# Patient Record
Sex: MALE | Race: BLACK OR AFRICAN AMERICAN | NOT HISPANIC OR LATINO | Employment: FULL TIME | URBAN - METROPOLITAN AREA
[De-identification: names, ages, dates, MRNs, and addresses within clinical notes are randomized per-mention and may not be internally consistent; named-entity substitution may affect disease eponyms.]

---

## 2019-03-11 ENCOUNTER — MEDICAL CORRESPONDENCE (OUTPATIENT)
Dept: TRANSPLANT | Facility: CLINIC | Age: 24
End: 2019-03-11

## 2020-11-26 ENCOUNTER — HOSPITAL ENCOUNTER (EMERGENCY)
Facility: CLINIC | Age: 25
Discharge: HOME | End: 2020-11-26
Attending: EMERGENCY MEDICINE | Admitting: EMERGENCY MEDICINE
Payer: COMMERCIAL

## 2020-11-26 VITALS
DIASTOLIC BLOOD PRESSURE: 68 MMHG | WEIGHT: 146.4 LBS | RESPIRATION RATE: 16 BRPM | HEART RATE: 58 BPM | OXYGEN SATURATION: 100 % | SYSTOLIC BLOOD PRESSURE: 99 MMHG | TEMPERATURE: 97.2 F

## 2020-11-26 DIAGNOSIS — G47.00 INSOMNIA, UNSPECIFIED TYPE: ICD-10-CM

## 2020-11-26 DIAGNOSIS — R51.9 NONINTRACTABLE HEADACHE, UNSPECIFIED CHRONICITY PATTERN, UNSPECIFIED HEADACHE TYPE: ICD-10-CM

## 2020-11-26 LAB
ALBUMIN UR-MCNC: NEGATIVE MG/DL
APPEARANCE UR: CLEAR
BILIRUB UR QL STRIP: NEGATIVE
C TRACH DNA SPEC QL NAA+PROBE: NEGATIVE
COLOR UR AUTO: YELLOW
GLUCOSE UR STRIP-MCNC: NEGATIVE MG/DL
HGB UR QL STRIP: NEGATIVE
KETONES UR STRIP-MCNC: NEGATIVE MG/DL
LEUKOCYTE ESTERASE UR QL STRIP: NEGATIVE
N GONORRHOEA DNA SPEC QL NAA+PROBE: NEGATIVE
NITRATE UR QL: NEGATIVE
PH UR STRIP: 6.5 PH (ref 5–7)
SOURCE: NORMAL
SP GR UR STRIP: 1.02 (ref 1–1.03)
SPECIMEN SOURCE: NORMAL
SPECIMEN SOURCE: NORMAL
UROBILINOGEN UR STRIP-MCNC: 2 MG/DL (ref 0–2)

## 2020-11-26 PROCEDURE — 81003 URINALYSIS AUTO W/O SCOPE: CPT | Performed by: EMERGENCY MEDICINE

## 2020-11-26 PROCEDURE — 99284 EMERGENCY DEPT VISIT MOD MDM: CPT | Performed by: EMERGENCY MEDICINE

## 2020-11-26 PROCEDURE — 250N000011 HC RX IP 250 OP 636: Performed by: EMERGENCY MEDICINE

## 2020-11-26 PROCEDURE — 87591 N.GONORRHOEAE DNA AMP PROB: CPT | Performed by: EMERGENCY MEDICINE

## 2020-11-26 PROCEDURE — 87491 CHLMYD TRACH DNA AMP PROBE: CPT | Performed by: EMERGENCY MEDICINE

## 2020-11-26 PROCEDURE — 96372 THER/PROPH/DIAG INJ SC/IM: CPT | Performed by: EMERGENCY MEDICINE

## 2020-11-26 PROCEDURE — 99284 EMERGENCY DEPT VISIT MOD MDM: CPT | Mod: 25 | Performed by: EMERGENCY MEDICINE

## 2020-11-26 RX ORDER — KETOROLAC TROMETHAMINE 30 MG/ML
30 INJECTION, SOLUTION INTRAMUSCULAR; INTRAVENOUS ONCE
Status: COMPLETED | OUTPATIENT
Start: 2020-11-26 | End: 2020-11-26

## 2020-11-26 RX ORDER — ACETAMINOPHEN 325 MG/1
325-650 TABLET ORAL EVERY 6 HOURS PRN
COMMUNITY

## 2020-11-26 RX ORDER — NAPROXEN 500 MG/1
500 TABLET ORAL 2 TIMES DAILY PRN
Qty: 24 TABLET | Refills: 0 | Status: SHIPPED | OUTPATIENT
Start: 2020-11-26 | End: 2020-12-08

## 2020-11-26 RX ADMIN — KETOROLAC TROMETHAMINE 30 MG: 30 INJECTION, SOLUTION INTRAMUSCULAR at 08:45

## 2020-11-26 NOTE — DISCHARGE INSTRUCTIONS
TODAY'S VISIT:  You were seen today for headache, difficulty sleeping     -You may use Tylenol and/or ibuprofen for headache relief at home.    -Over-the-counter Benadryl and/or melatonin can be used at home to help you sleep  -   - If you had any labs or imaging/radiology tests performed today, you should also discuss these tests with your usual provider.     FOLLOW-UP:  Please make an appointment to follow up with:  - Your Primary Care Provider. If you do not have a PCP, please call the Primary Care Center (phone: [phone] for an appointment    - Have your provider review the results from today's visit with you again to make sure no further follow-up or additional testing is needed based on those results.     RETURN TO THE EMERGENCY DEPARTMENT  Return to the Emergency Department at any time for any new or worsening symptoms or any concerns.

## 2020-11-26 NOTE — ED AVS SNAPSHOT
[facility]  [address]  [place] [address]  Phone: [phone]  Fax: [phone]                                    Stephon Wolfe   MRN: [id number]    Department: [facility]   Date of Visit: 11/26/2020           After Visit Summary Signature Page    I have received my discharge instructions, and my questions have been answered. I have discussed any challenges I see with this plan with the nurse or doctor.    ..........................................................................................................................................  Patient/Patient Representative Signature      ..........................................................................................................................................  Patient Representative Print Name and Relationship to Patient    ..................................................               ................................................  Date                                   Time    ..........................................................................................................................................  Reviewed by Signature/Title    ...................................................              ..............................................  Date                                               Time          22EPIC Rev 08/18

## 2020-11-26 NOTE — ED PROVIDER NOTES
"  History     Chief Complaint   Patient presents with     Insomnia     Headache     HPI  Stephon Wolef is a 24 year old male with a past medical history of GERD, bipolar disorder, agoraphobia, depression, panic disorder, eczema, asthma who presents to the emergency department with a chief complaint of headache.  The headache is located in the frontal area of his head as well as on top of his head.  No associated symptoms. He has tried Tylenol for his headache, this was not effective.  The patient is afebrile on arrival to the emergency department.  No neurological symptoms.  He would like something for his headache in the emergency department today.  Headache has been present for several days.    The patient reports insomnia as well.  He states that he has not been able to sleep very well for 3 or 4 months.  He states he tried an over-the-counter \"sleep aid\" he is not sure what this contained.  Per chart review, it appears that the patient has had prescriptions for Benadryl, melatonin, Ambien, Seroquel to help with sleep in the past.  However, he states he is no longer prescribed any of these medications.  The patient does admit that his routine has changed over the last several months due to the COVID-19 pandemic.  He admits that he has been sleeping late in the day since he has nowhere that he has to be, which resulted him staying up later and later at night.  The patient also admits to drinking several cups of caffeinated tea throughout the day and using his phone shortly before bed and when he cannot sleep at night.    Lastly, patient request to be tested for STIs.  The patient states that he was sexually active a little over a week ago and is unsure if he may have been exposed to any infections at that time.  He states he did use a condom.  He denies any symptoms at this time.    I have reviewed the Medications, Allergies, Past Medical and Surgical History, and Social History in the Epic " system.    History reviewed. No pertinent past medical history.  History reviewed. No pertinent surgical history.  No current facility-administered medications for this encounter.      Current Outpatient Medications   Medication     acetaminophen (TYLENOL) 325 MG tablet     diphenhydrAMINE (BENADRYL) 25 MG tablet     No Known Allergies  Past medical history, past surgical history, medications, and allergies were reviewed with the patient. Additional pertinent items: None    Social History     Socioeconomic History     Marital status: Single     Spouse name: Not on file     Number of children: Not on file     Years of education: Not on file     Highest education level: Not on file   Occupational History     Not on file   Social Needs     Financial resource strain: Not on file     Food insecurity     Worry: Not on file     Inability: Not on file     Transportation needs     Medical: Not on file     Non-medical: Not on file   Tobacco Use     Smoking status: Never Smoker     Smokeless tobacco: Never Used   Substance and Sexual Activity     Alcohol use: No     Drug use: No     Sexual activity: Not on file   Lifestyle     Physical activity     Days per week: Not on file     Minutes per session: Not on file     Stress: Not on file   Relationships     Social connections     Talks on phone: Not on file     Gets together: Not on file     Attends [employer] service: Not on file     Active member of club or organization: Not on file     Attends meetings of clubs or organizations: Not on file     Relationship status: Not on file     Intimate partner violence     Fear of current or ex partner: Not on file     Emotionally abused: Not on file     Physically abused: Not on file     Forced sexual activity: Not on file   Other Topics Concern     Not on file   Social History Narrative     Not on file     Social history was reviewed with the patient. Additional pertinent items: None    Review of Systems  General: No fevers or  chills  Skin: No rash or diaphoresis  Eyes: No eye redness or discharge  Ears/Nose/Throat: No rhinorrhea or nasal congestion  Respiratory: No cough or SOB  Cardiovascular: No chest pain or palpitations  Gastrointestinal: No nausea, vomiting, or diarrhea  Genitourinary: No urinary frequency, hematuria, or dysuria  Musculoskeletal: No arthralgias or myalgias  Neurologic: No numbness or weakness, positive for headache  Hematologic/Lymphatic/Immunologic: No leg swelling, no easy bruising/bleeding  Endocrine: No polyuria/polydypsia    A complete review of systems was performed with pertinent positives and negatives noted in the HPI, and all other systems negative.    Physical Exam   BP: 117/77  Pulse: 57  Temp: 98  F (36.7  C)  Resp: 18  Weight: 66.4 kg (146 lb 6.4 oz)  SpO2: 100 %      General: Well nourished, well developed, NAD  HEENT: EOMI, anicteric. NCAT, MMM  Neck: no jugular venous distension, supple, nl ROM  Cardiac: Regular rate and rhythm. No murmurs, rubs, or gallops. Normal S1, S2.  Intact peripheral pulses  Pulm: Airway patent, nonlabored breathing  Skin: Warm and dry to the touch.  No rash  Extremities: No LE edema, no cyanosis, w/w/p  Neuro: Alert and oriented x 4, no facial droop, CN II-XII intact, strength 5/5 all 4 extremities, sensation intact throughout, steady gait, no nystagmus, coordination normal as tested. PERRL, EOMI.  Speech is clear without aphasia or dysarthria.      ED Course        Procedures                           Labs Ordered and Resulted from Time of ED Arrival Up to the Time of Departure from the ED   UA MACROSCOPIC WITH REFLEX TO MICRO AND CULTURE   CHLAMYDIA TRACHOMATIS PCR   NEISSERIA GONORRHOEAE PCR            Results for orders placed or performed during the hospital encounter of 11/26/20 (from the past 24 hour(s))   UA reflex to Microscopic and Culture    Specimen: Urine clean catch; Unspecified Urine   Result Value Ref Range    Color Urine Yellow     Appearance Urine Clear      Glucose Urine Negative NEG^Negative mg/dL    Bilirubin Urine Negative NEG^Negative    Ketones Urine Negative NEG^Negative mg/dL    Specific Gravity Urine 1.019 1.003 - 1.035    Blood Urine Negative NEG^Negative    pH Urine 6.5 5.0 - 7.0 pH    Protein Albumin Urine Negative NEG^Negative mg/dL    Urobilinogen mg/dL 2.0 0.0 - 2.0 mg/dL    Nitrite Urine Negative NEG^Negative    Leukocyte Esterase Urine Negative NEG^Negative    Source Unspecified Urine        Labs, vital signs, and imaging studies were reviewed by me.    Medications   ketorolac (TORADOL) injection 30 mg (has no administration in time range)       Assessments & Plan (with Medical Decision Making)   Setphon Wolfe is a 24 year old male who presents with headache and insomnia.  Patient is afebrile, neck is supple on exam, no findings to suggest meningitis.  Differential diagnosis for patient's headache includes migraine, tension headache, viral syndrome.  Patient's insomnia may be related to pain or underlying psychiatric diagnoses.  IM Toradol given for headache relief in the emergency department.  Gonorrhea and Chlamydia testing were sent per patient request.    After medications were given in the emergency department, the patient feels better    Urinalysis is wnl.  Gonorrhea and Chlamydia testing are pending at this time    Patient was provided with information on sleep hygiene.  Advised patient that he should cut down on his caffeine consumption, particularly late in the day.  Advised patient to try and stick to 1 or 2 cups of caffeinated tea in the morning hours and then switch to decaffeinated beverages (such as herbal tea) later in the day.  Also advised the patient to limit screen time (phone, computer, TV) in the hours before bed and especially while laying in bed unable to sleep.  Also advised patient that if he is unable to sleep, it may be helpful to get up out of bed for a short period of time, find another activity (such as reading,  etc.) for a few minutes before returning to bed and trying to sleep again.  He was advised that over-the-counter Benadryl or melatonin may be used to help him sleep as well    I have reviewed the nursing notes.    I have reviewed the findings, diagnosis, plan and need for follow up with the patient.    Patient to be discharged home. Advised to follow up with PCP within 1 week. To return to ER immediately with any new/worsening symptoms. Plan of care discussed with patient who expresses understanding and agrees with plan of care.  Patient advised to use ibuprofen and/or Tylenol as needed for headache control at home.      New Prescriptions    MELATONIN 5 MG CAPS    Take 5 mg by mouth nightly as needed (sleep)    NAPROXEN (NAPROSYN) 500 MG TABLET    Take 1 tablet (500 mg) by mouth 2 times daily as needed for moderate pain or headaches       Final diagnoses:   Nonintractable headache, unspecified chronicity pattern, unspecified headache type   Insomnia, unspecified type       11/26/2020   [facility] EMERGENCY DEPARTMENT     Deborah Lomax MD  11/26/20 0906       Deborah Lomax MD  11/26/20 0907

## 2021-02-18 ENCOUNTER — HOSPITAL ENCOUNTER (EMERGENCY)
Facility: CLINIC | Age: 26
Discharge: HOME | End: 2021-02-18
Attending: EMERGENCY MEDICINE | Admitting: EMERGENCY MEDICINE
Payer: COMMERCIAL

## 2021-02-18 VITALS
HEIGHT: 68 IN | BODY MASS INDEX: 22.73 KG/M2 | WEIGHT: 150 LBS | OXYGEN SATURATION: 100 % | HEART RATE: 60 BPM | TEMPERATURE: 98 F | DIASTOLIC BLOOD PRESSURE: 51 MMHG | SYSTOLIC BLOOD PRESSURE: 110 MMHG | RESPIRATION RATE: 18 BRPM

## 2021-02-18 DIAGNOSIS — K08.89 PAIN, DENTAL: ICD-10-CM

## 2021-02-18 DIAGNOSIS — G44.219 EPISODIC TENSION-TYPE HEADACHE, NOT INTRACTABLE: ICD-10-CM

## 2021-02-18 PROCEDURE — 96372 THER/PROPH/DIAG INJ SC/IM: CPT | Performed by: EMERGENCY MEDICINE

## 2021-02-18 PROCEDURE — 99284 EMERGENCY DEPT VISIT MOD MDM: CPT | Performed by: EMERGENCY MEDICINE

## 2021-02-18 PROCEDURE — 250N000011 HC RX IP 250 OP 636: Performed by: EMERGENCY MEDICINE

## 2021-02-18 PROCEDURE — 250N000013 HC RX MED GY IP 250 OP 250 PS 637: Performed by: EMERGENCY MEDICINE

## 2021-02-18 RX ORDER — ERGOCALCIFEROL 1.25 MG/1
CAPSULE ORAL
COMMUNITY

## 2021-02-18 RX ORDER — KETOROLAC TROMETHAMINE 15 MG/ML
15 INJECTION, SOLUTION INTRAMUSCULAR; INTRAVENOUS ONCE
Status: COMPLETED | OUTPATIENT
Start: 2021-02-18 | End: 2021-02-18

## 2021-02-18 RX ORDER — DIPHENHYDRAMINE HCL 50 MG
50 CAPSULE ORAL ONCE
Status: COMPLETED | OUTPATIENT
Start: 2021-02-18 | End: 2021-02-18

## 2021-02-18 RX ADMIN — KETOROLAC TROMETHAMINE 15 MG: 15 INJECTION, SOLUTION INTRAMUSCULAR; INTRAVENOUS at 06:24

## 2021-02-18 RX ADMIN — DIPHENHYDRAMINE HYDROCHLORIDE 50 MG: 50 CAPSULE ORAL at 06:24

## 2021-02-18 ASSESSMENT — ENCOUNTER SYMPTOMS
HEADACHES: 1
SHORTNESS OF BREATH: 0
ABDOMINAL PAIN: 0
BACK PAIN: 0
CONFUSION: 0
FEVER: 0
RHINORRHEA: 0
BRUISES/BLEEDS EASILY: 0
SLEEP DISTURBANCE: 1
DYSURIA: 0
ARTHRALGIAS: 1

## 2021-02-18 ASSESSMENT — MIFFLIN-ST. JEOR: SCORE: 1634.9

## 2021-02-18 NOTE — DISCHARGE INSTRUCTIONS
Thank you for your patience today.  Please follow-up with your regular doctor in the next 2-3 days for further evaluation and follow-up care.  Please call to schedule an appointment.  Please follow-up with your dentist at your scheduled appointment next week. please continue your own medications.  Please take tylenol 1000 mg and ibuprofen 600 mg every 6 hours as needed for pain. Please take melatonin at bedtime to help with sleep. Please return to the ER if you develop any worsening of your current symptoms.  It was a pleasure taking care of you today.  We hope you feel better soon.

## 2021-02-18 NOTE — ED PROVIDER NOTES
ED Provider Note  [facility]      History     Chief Complaint   Patient presents with     Dental Pain     Headache     unable to sleep due to dental pain causing headache     HPI  Stephon Wolfe is a 25 year old male who presents to the emergency department with a complaint of dental pain, headache, insomnia.  Patient reports that he has a bad tooth in his right lower mouth.  Patient complains of intermittent dental pain for the past 1 week and has an appointment scheduled with his dentist next week for likely extraction.  Patient reports that due to his dental pain he has developed a headache and has been unable to sleep at night due to the pain.  Patient denies any fever, chills, vision changes, neck pain, back pain, chest pain, shortness of breath, difficulty swallowing, no other complaints.  Patient states he just wants the pain to decrease a little so he is able to sleep.     Past Medical History  History reviewed. No pertinent past medical history.  History reviewed. No pertinent surgical history.  ergocalciferol (ERGOCALCIFEROL) 1.25 MG (13025 UT) capsule  acetaminophen (TYLENOL) 325 MG tablet  diphenhydrAMINE (BENADRYL) 25 MG tablet      No Known Allergies  Family History  History reviewed. No pertinent family history.  Social History   Social History     Tobacco Use     Smoking status: Never Smoker     Smokeless tobacco: Never Used   Substance Use Topics     Alcohol use: No     Drug use: No      Past medical history, past surgical history, medications, allergies, family history, and social history were reviewed with the patient. No additional pertinent items.       Review of Systems   Constitutional: Negative for fever.   HENT: Positive for dental problem. Negative for rhinorrhea.    Eyes: Negative for visual disturbance.   Respiratory: Negative for shortness of breath.    Cardiovascular: Negative for chest pain.   Gastrointestinal: Negative for abdominal pain.   Endocrine:  "Negative for polyuria.   Genitourinary: Negative for dysuria.   Musculoskeletal: Positive for arthralgias. Negative for back pain.   Skin: Negative for rash.   Allergic/Immunologic: Negative for immunocompromised state.   Neurological: Positive for headaches.   Hematological: Does not bruise/bleed easily.   Psychiatric/Behavioral: Positive for sleep disturbance. Negative for confusion.     Physical Exam   BP: 110/51  Pulse: 60  Temp: 98  F (36.7  C)  Resp: 18  Height: 172.7 cm (5' 8\")  Weight: 68 kg (150 lb)  SpO2: 100 %  Physical Exam  General: Afebrile, no acute distress   HEENT: Normocephalic, atraumatic, conjunctivae normal.  Pupils equal round react to light, extraocular movements intact right lower tooth #30 cracked, necrotic, no area of fluctuance, abscess. Posterior pharynx with no swelling, no exudates, moist mucous membranes  Neck: non-tender, supple  Cardio: regular rate. regular rhythm   Resp: Normal work of breathing, no respiratory distress, lungs clear bilaterally, no wheezing, rhonchi, rales  Chest/Back: no visual signs of trauma, no CVA tenderness   Abdomen: soft, non distension, no tenderness, no peritoneal signs   Neuro: alert and fully oriented. CN II-XII intact.  Normal strength and sensation in all extremities, no focal deficit, normal gait  MSK: no deformities. Normal range of motion  Integumentary/Skin: no rash visualized, normal color  Psych: normal affect, normal behavior    ED Course      Procedures       No results found for any visits on 02/18/21.  Medications   ketorolac (TORADOL) injection 15 mg (has no administration in time range)   diphenhydrAMINE (BENADRYL) capsule 50 mg (has no administration in time range)        Assessments & Plan (with Medical Decision Making)   Stephon Wolfe is a 25 year old male who presents to the emergency department with a complaint of dental pain, headache, insomnia.  Upon arrival patient is well-appearing, afebrile, no distress.  Patient " hemodynamically stable vital signs within normal limits blood pressure 110/51, heart rate 60, oxygen 100% on room air.  Patient with right lower tooth #30 which is cracked, decayed, and has a follow-up appointment with his dentist next week for likely extraction.  Here with headache secondary to dental pain and unable to sleep.  No signs of acute infection.  At this time plan for Toradol, Benadryl to help with his headache.  Encourage patient to go home, rest, continue Tylenol and ibuprofen as needed for pain, soft diet, follow-up closely with his primary care provider and dentist at scheduled appointment.  Return precautions discussed.  Patient understands and agrees with the plan.     I have reviewed the nursing notes. I have reviewed the findings, diagnosis, plan and need for follow up with the patient.    New Prescriptions    No medications on file       Final diagnoses:   Episodic tension-type headache, not intractable   Pain, dental       --  Janie Subramanian MD  [facility] EMERGENCY DEPARTMENT  2/18/2021     Janie Subramanian MD  02/18/21 0608

## 2021-03-06 ENCOUNTER — HOSPITAL ENCOUNTER (EMERGENCY)
Facility: CLINIC | Age: 26
Discharge: HOME | End: 2021-03-06
Attending: EMERGENCY MEDICINE | Admitting: EMERGENCY MEDICINE
Payer: COMMERCIAL

## 2021-03-06 ENCOUNTER — APPOINTMENT (OUTPATIENT)
Dept: CARDIOLOGY | Facility: CLINIC | Age: 26
End: 2021-03-06
Attending: EMERGENCY MEDICINE
Payer: COMMERCIAL

## 2021-03-06 VITALS
OXYGEN SATURATION: 100 % | HEART RATE: 54 BPM | DIASTOLIC BLOOD PRESSURE: 90 MMHG | RESPIRATION RATE: 22 BRPM | SYSTOLIC BLOOD PRESSURE: 123 MMHG

## 2021-03-06 DIAGNOSIS — K08.409 S/P TOOTH EXTRACTION: ICD-10-CM

## 2021-03-06 LAB
ANION GAP SERPL CALCULATED.3IONS-SCNC: 4 MMOL/L (ref 3–14)
BUN SERPL-MCNC: 17 MG/DL (ref 7–30)
CALCIUM SERPL-MCNC: 9.1 MG/DL (ref 8.5–10.1)
CHLORIDE SERPL-SCNC: 104 MMOL/L (ref 94–109)
CO2 SERPL-SCNC: 31 MMOL/L (ref 20–32)
CREAT SERPL-MCNC: 0.87 MG/DL (ref 0.66–1.25)
GFR SERPL CREATININE-BSD FRML MDRD: >90 ML/MIN/{1.73_M2}
GLUCOSE BLDC GLUCOMTR-MCNC: 119 MG/DL (ref 70–99)
GLUCOSE SERPL-MCNC: 125 MG/DL (ref 70–99)
HGB BLD-MCNC: 15.7 G/DL (ref 13.3–17.7)
POTASSIUM SERPL-SCNC: 3.6 MMOL/L (ref 3.4–5.3)
SODIUM SERPL-SCNC: 139 MMOL/L (ref 133–144)
TROPONIN I SERPL-MCNC: <0.015 UG/L (ref 0–0.04)

## 2021-03-06 PROCEDURE — 93010 ELECTROCARDIOGRAM REPORT: CPT | Performed by: EMERGENCY MEDICINE

## 2021-03-06 PROCEDURE — 84484 ASSAY OF TROPONIN QUANT: CPT | Performed by: EMERGENCY MEDICINE

## 2021-03-06 PROCEDURE — 93306 TTE W/DOPPLER COMPLETE: CPT

## 2021-03-06 PROCEDURE — 99285 EMERGENCY DEPT VISIT HI MDM: CPT | Mod: 25 | Performed by: EMERGENCY MEDICINE

## 2021-03-06 PROCEDURE — 93306 TTE W/DOPPLER COMPLETE: CPT | Mod: 26 | Performed by: INTERNAL MEDICINE

## 2021-03-06 PROCEDURE — 96360 HYDRATION IV INFUSION INIT: CPT | Performed by: EMERGENCY MEDICINE

## 2021-03-06 PROCEDURE — 93005 ELECTROCARDIOGRAM TRACING: CPT | Performed by: EMERGENCY MEDICINE

## 2021-03-06 PROCEDURE — 80048 BASIC METABOLIC PNL TOTAL CA: CPT | Performed by: EMERGENCY MEDICINE

## 2021-03-06 PROCEDURE — 258N000003 HC RX IP 258 OP 636: Performed by: EMERGENCY MEDICINE

## 2021-03-06 PROCEDURE — 85018 HEMOGLOBIN: CPT | Performed by: EMERGENCY MEDICINE

## 2021-03-06 PROCEDURE — 999N001017 HC STATISTIC GLUCOSE BY METER IP

## 2021-03-06 PROCEDURE — 99285 EMERGENCY DEPT VISIT HI MDM: CPT | Performed by: EMERGENCY MEDICINE

## 2021-03-06 PROCEDURE — 96361 HYDRATE IV INFUSION ADD-ON: CPT | Performed by: EMERGENCY MEDICINE

## 2021-03-06 RX ORDER — IBUPROFEN 600 MG/1
600 TABLET, FILM COATED ORAL EVERY 6 HOURS PRN
COMMUNITY
End: 2022-05-18

## 2021-03-06 RX ADMIN — SODIUM CHLORIDE 1000 ML: 9 INJECTION, SOLUTION INTRAVENOUS at 17:21

## 2021-03-06 ASSESSMENT — ENCOUNTER SYMPTOMS
ABDOMINAL PAIN: 0
SHORTNESS OF BREATH: 0
FEVER: 0

## 2021-03-06 NOTE — ED PROVIDER NOTES
[facility] EMERGENCY DEPARTMENT ([facility])     March 6, 2021    History     Chief Complaint   Patient presents with     Dental Problem     Patient had a tooth extraction 3 days ago, bleeding stared today around 9 am     The history is provided by the patient and medical records.     Stephon Wolfe is a 25 year old male who presents to the Emergency Department for evaluation of a dental problem.  Patient had a tooth extraction on Wednesday, 3/3/2021 and reports noticing bleeding at his extraction site this morning at 9 am when he woke up.       PAST MEDICAL HISTORY: History reviewed. No pertinent past medical history.    PAST SURGICAL HISTORY: History reviewed. No pertinent surgical history.    Past medical history, past surgical history, medications, and allergies were reviewed with the patient. Additional pertinent items: None    FAMILY HISTORY: History reviewed. No pertinent family history.    SOCIAL HISTORY:   Social History     Tobacco Use     Smoking status: Never Smoker     Smokeless tobacco: Never Used   Substance Use Topics     Alcohol use: No     Social history was reviewed with the patient. Additional pertinent items: None      Patient's Medications   New Prescriptions    No medications on file   Previous Medications    ACETAMINOPHEN (TYLENOL) 325 MG TABLET    Take 325-650 mg by mouth every 6 hours as needed for mild pain    DIPHENHYDRAMINE (BENADRYL) 25 MG TABLET    Take 1 tablet (25 mg) by mouth every 6 hours as needed for itching    ERGOCALCIFEROL (ERGOCALCIFEROL) 1.25 MG (48965 UT) CAPSULE    daily.    IBUPROFEN (ADVIL/MOTRIN) 600 MG TABLET    Take 600 mg by mouth every 6 hours as needed for moderate pain   Modified Medications    No medications on file   Discontinued Medications    No medications on file        No Known Allergies     Review of Systems   Constitutional: Negative for fever.   HENT: Positive for dental problem (bleeding).    Respiratory: Negative for shortness of breath.     Cardiovascular: Negative for chest pain.   Gastrointestinal: Negative for abdominal pain.   All other systems reviewed and are negative.    A complete review of systems was performed with pertinent positives and negatives noted in the HPI, and all other systems negative.    Physical Exam   BP: 118/88  Pulse: 62  Resp: 16  SpO2: 100 %      Physical Exam  Constitutional:       General: He is not in acute distress.     Appearance: He is not diaphoretic.   HENT:      Head: Atraumatic.      Mouth/Throat:     Eyes:      General: No scleral icterus.     Pupils: Pupils are equal, round, and reactive to light.   Cardiovascular:      Heart sounds: Normal heart sounds.   Pulmonary:      Effort: No respiratory distress.      Breath sounds: Normal breath sounds.   Abdominal:      General: Bowel sounds are normal.      Palpations: Abdomen is soft.      Tenderness: There is no abdominal tenderness.   Musculoskeletal:         General: No tenderness.   Skin:     General: Skin is warm.      Findings: No rash.         ED Course   9:48 AM  The patient was seen and examined by Kade Garcia MD in Room ED07.        Procedures                         2 x 2 and Surgicel placed  No results found for this or any previous visit (from the past 24 hour(s)).  Medications - No data to display          Assessments & Plan (with Medical Decision Making)   25-year-old who presents 3 days status post extraction of tooth #30 with bleeding.  Differential includes removal of clot, osteomyelitis, infection, gum laceration.  Exam revealed bleeding from the site of former tooth #30.  Attempts at direct pressure with a rolled up 2 x 2, Surgicel were ultimately unsuccessful.  Dental resident was in to inject tooth and apply Surgicel.  Patient was observed and felt improved.  He will follow up with dental clinic or return to the emergency room with further bleeding.    I have reviewed the nursing notes.    I have reviewed the findings, diagnosis, plan and  need for follow up with the patient.    New Prescriptions    No medications on file       Final diagnoses:   S/P tooth extraction     IMaggie, am serving as a trained medical scribe to document services personally performed by Kade Garcia MD based on the provider's statements to me on March 6, 2021.  This document has been checked and approved by the attending provider.    IKade MD, was physically present and have reviewed and verified the accuracy of this note documented by Maggie Camp, medical scribe.    --  Kade Garcia MD  3/6/2021   [facility] EMERGENCY DEPARTMENT     Kade Garcia MD  03/06/21 [id number]

## 2021-03-07 LAB — INTERPRETATION ECG - MUSE: NORMAL

## 2021-03-07 NOTE — CONSULTS
"Dental Service Consultation         Stephon Wolfe MRN# [id number]   YOB: 1995 Age: 25 year old   Date of Admission: 3/6/2021     Reason for consult: I was asked by Kade Garcia MD to evaluate this patient for bleeding after a tooth extraction.           Assessment and Plan:     Assessment: bleeding from the site of extracted tooth #30    Plan:  1) Instructed the patient to bite on gauze for 5 sitting upright  2) Infiltrated the site with 1.7 ml 2% lidocaine w/epinephrine 1:100,000 and gave right JEAN PIERRE block with 1.7 ml Marcaine 0.5% with epineprhine 1:200,000  3) Placed Surgicel in the socket with 3.0 chromic gut sutures figure 8  4) Instructed patient to bite on damp gauze sitting upright  5) Patient should be monitored for 30 minutes before the discharge  6) Postop instructions given; all the questions answered.   7) Patient should schedule a follow up with [facility] or can be scheduled in [facility] for a follow up ([phone]) upon the discharge.           Chief Complaint:   \"I had a tooth extracted 3 days ago and this morning I experienced pain and profound bleeding\"          History of Present Illness:   This patient is a 25 year old male who presents to ED [facility] with bleeding after the extraction of #30 tooth.               Past Medical History:   History reviewed. No pertinent past medical history.          Past Surgical History:   History reviewed. No pertinent surgical history.            Social History:     Social History     Tobacco Use     Smoking status: Never Smoker     Smokeless tobacco: Never Used   Substance Use Topics     Alcohol use: No             Family History:   History reviewed. No pertinent family history.          Immunizations:     There is no immunization history on file for this patient.          Allergies:   No Known Allergies          Medications:     No current Epic-ordered facility-administered medications on file.      Current Outpatient Medications " Ordered in Epic   Medication     ibuprofen (ADVIL/MOTRIN) 600 MG tablet     acetaminophen (TYLENOL) 325 MG tablet     diphenhydrAMINE (BENADRYL) 25 MG tablet     ergocalciferol (ERGOCALCIFEROL) 1.25 MG (93604 UT) capsule             Review of Systems:   The 10 point Review of Systems is negative other than noted in the HPI            Physical Exam:   Vitals were reviewed      BP: 117/89 Pulse: 57   Resp: 22 SpO2: 100 % O2 Device: None (Room air)      Head and neck exam : No obvious facial asymmetry, trismus or lymphadenopathy. No submental, submandibular or other cervical lymph nodes are palpable and TMJ appear normal.   Intraoral exam: Bleeding from the site of the extracted tooth #30.    The patient was discussed with: Dr. Tayo Durbin   PGY1  Pager: [phone]

## 2021-03-07 NOTE — ED NOTES
Emergency Department Patient Sign-out       Brief HPI and ED course:  Patient is a 25 year old male signed out to me by Dr. Garcia.  See initial ED Provider note for details of the presentation. In brief, prior to being discharged, patient reported feeling lightheaded and ill.  He was noted to be bradycardic and hypertensive.  IV was established and fluids were started.  Hemoglobin is within normal limits.  Twelve-lead EKG demonstrated a sinus bradycardia with ST elevations and convex appearing ST elevation in V1 with some diffuse ST elevations with convex appearing ST segments.  No reciprocal changes were noted.  I did discuss this with interventional cardiology and it was felt that this most likely represented early repolarization but that we should obtain a stat echo to evaluate for any regional wall motion abnormalities.  This echo demonstrated no regional wall motion abnormalities.  Troponin is negative and patient feels better after period of observation with IV fluids.  Vital signs have normalized and at this point I am comfortable discharging him and suspect that he had a reaction to the anesthetic which had been injected which contained epinephrine.  This was all discussed with the patient and his family and he was discharged in good condition.    Vitals:   Patient Vitals for the past 24 hrs:   BP Pulse Resp SpO2   03/06/21 1945 112/85 56 -- 100 %   03/06/21 1930 124/79 64 -- 100 %   03/06/21 1915 116/84 56 -- 99 %   03/06/21 1900 117/89 57 -- 100 %   03/06/21 1845 (!) 119/91 59 -- 100 %   03/06/21 1830 (!) 123/92 52 -- 100 %   03/06/21 1815 (!) 142/91 52 -- 100 %   03/06/21 1800 (!) 141/96 53 -- 100 %   03/06/21 1745 (!) 153/104 52 -- 100 %   03/06/21 1738 (!) 143/99 53 -- 100 %   03/06/21 1730 (!) 146/96 52 22 100 %   03/06/21 1715 (!) 157/104 -- -- 100 %   03/06/21 1700 (!) 161/105 50 -- 97 %   03/06/21 1632 (!) 167/110 (!) 48 -- --   03/06/21 1629 (!) 170/114 52 -- --   03/06/21 0937 118/88 62 16 100  %       Results for orders placed or performed during the hospital encounter of 21   Glucose by meter     Status: Abnormal   Result Value Ref Range    Glucose 119 (H) 70 - 99 mg/dL   Hemoglobin     Status: None   Result Value Ref Range    Hemoglobin 15.7 13.3 - 17.7 g/dL   Basic metabolic panel     Status: Abnormal   Result Value Ref Range    Sodium 139 133 - 144 mmol/L    Potassium 3.6 3.4 - 5.3 mmol/L    Chloride 104 94 - 109 mmol/L    Carbon Dioxide 31 20 - 32 mmol/L    Anion Gap 4 3 - 14 mmol/L    Glucose 125 (H) 70 - 99 mg/dL    Urea Nitrogen 17 7 - 30 mg/dL    Creatinine 0.87 0.66 - 1.25 mg/dL    GFR Estimate >90 >60 mL/min/[1.73_m2]    GFR Estimate If Black >90 >60 mL/min/[1.73_m2]    Calcium 9.1 8.5 - 10.1 mg/dL   Troponin I     Status: None   Result Value Ref Range    Troponin I ES <0.015 0.000 - 0.045 ug/L   Echocardiogram Complete     Status: None    Narrative    [id number]  [id number]  [id number]  [id number]^MAXIMILIANO^OLIVA^[facility],[place]  Echocardiography Laboratory  [address] [place], [place] [address]     Name: KATHY WAGNER  MRN: [id number]  : 1995  Study Date: 2021 07:09 PM  Age: 25 yrs  Gender: Male  Patient Location: [facility]  Reason For Study: Abn EKG  Ordering Physician: OLIVA VIERA  Performed By: Valery Yang RDCS     BSA: 1.8 m2  Height: 68 in  Weight: 150 lb  HR: 58  BP: 117/89 mmHg  _____________________________________________________________________________  __        Procedure  Complete Portable Echo Adult.  _____________________________________________________________________________  __        Interpretation Summary  Global and regional left ventricular function is normal with an EF of 60-65%.  Right ventricular function, chamber size, wall motion, and thickness are  normal.  Both atria appear normal.  Pulmonary artery systolic pressure is normal.  The inferior vena cava is normal.  No pericardial effusion is  present.  There is no prior study for direct comparison.  _____________________________________________________________________________  __        Left Ventricle  Global and regional left ventricular function is normal with an EF of 60-65%.  Left ventricular wall thickness is normal. Left ventricular size is normal.  Left ventricular diastolic function is normal. No regional wall motion  abnormalities are seen.     Right Ventricle  Right ventricular function, chamber size, wall motion, and thickness are  normal.     Atria  Both atria appear normal.     Mitral Valve  The mitral valve is normal. Trace mitral insufficiency is present.        Aortic Valve  The valve leaflets are not well visualized. On Doppler interrogation, there is  no significant stenosis or regurgitation.     Tricuspid Valve  The tricuspid valve is normal. Trace to mild tricuspid insufficiency is  present. The right ventricular systolic pressure is approximated at 15.7 mmHg  plus the right atrial pressure. Pulmonary artery systolic pressure is normal.     Pulmonic Valve  The pulmonic valve is normal. Trace pulmonic insufficiency is present.     Vessels  The thoracic aorta cannot be assessed. The pulmonary artery and bifurcation  cannot be assessed. The inferior vena cava is normal.     Pericardium  No pericardial effusion is present.        Compared to Previous Study  There is no prior study for direct comparison.  _____________________________________________________________________________  __  MMode/2D Measurements & Calculations     IVSd: 1.0 cm  LVIDd: 4.5 cm  LVIDs: 3.3 cm  LVPWd: 1.1 cm  FS: 25.2 %  LV mass(C)d: 162.6 grams  LV mass(C)dI: 89.9 grams/m2  RWT: 0.49        Doppler Measurements & Calculations  MV E max koffi: 77.0 cm/sec  MV A max koffi: 37.0 cm/sec  MV E/A: 2.1  MV dec time: 0.16 sec  TR max koffi: 198.1 cm/sec  TR max PG: 15.7 mmHg  E/E' av.3  Lateral E/e': 5.4  Medial E/e': 7.1         _____________________________________________________________________________  __        Report approved by: Vazquez Watkins 03/06/2021 07:44 PM               EKG Interpretation:      Interpreted by Last Balderrama MD  Time reviewed:1380   Symptoms at time of EKG: Lightheadedness  Rhythm: Sinus bradycardia  Rate: 51  Ectopy: None  Conduction: Normal  ST Segments/ T Waves: ST Segement Elevation I, aVL, V1, V2, V3, V4, V5 and V6 and Early repolarization  Q Waves: None  Comparison to prior: No old EKG available    Clinical Impression: Sinus bradycardia with some diffuse ST elevation, suspect early repolarization       --  Last Balderrama MD   Emergency Medicine       Last Balderrama MD  03/06/21 2006

## 2021-06-13 ENCOUNTER — HOSPITAL ENCOUNTER (EMERGENCY)
Facility: CLINIC | Age: 26
Discharge: HOME | End: 2021-06-13
Attending: EMERGENCY MEDICINE | Admitting: EMERGENCY MEDICINE
Payer: COMMERCIAL

## 2021-06-13 VITALS
HEART RATE: 62 BPM | DIASTOLIC BLOOD PRESSURE: 70 MMHG | SYSTOLIC BLOOD PRESSURE: 100 MMHG | OXYGEN SATURATION: 100 % | RESPIRATION RATE: 16 BRPM | TEMPERATURE: 97.6 F | BODY MASS INDEX: 22.35 KG/M2 | WEIGHT: 147 LBS

## 2021-06-13 DIAGNOSIS — G47.00 INSOMNIA, UNSPECIFIED TYPE: ICD-10-CM

## 2021-06-13 DIAGNOSIS — F41.9 ANXIETY: ICD-10-CM

## 2021-06-13 PROCEDURE — 99283 EMERGENCY DEPT VISIT LOW MDM: CPT

## 2021-06-13 PROCEDURE — 99283 EMERGENCY DEPT VISIT LOW MDM: CPT | Performed by: EMERGENCY MEDICINE

## 2021-06-13 RX ORDER — LANOLIN ALCOHOL/MO/W.PET/CERES
5 CREAM (GRAM) TOPICAL
COMMUNITY
End: 2021-06-13

## 2021-06-13 NOTE — DISCHARGE INSTRUCTIONS
Follow a good sleep hygiene plan to improve your sleep.  Try to make the following behavioral modifications:    -Allow at least 8 hours of sleep per night.  -Try to create a schedule where you go to bed and wake up at approximately the same time.  -No screen time TV or iPhone's at least 1 hour before bedtime.  -Avoid any caffeinated beverages after 10am  -Avoid exercise, alcohol or any food intake at least 2 hours before bedtime.  -Discontinue melatonin.

## 2021-06-13 NOTE — ED PROVIDER NOTES
ED Provider Note  [facility]      History     Chief Complaint   Patient presents with     Insomnia     Pt having insomnia.  Pt describes playing soccer twice a day and having difficulty falling asleep after playing from body aches.     HPI  Stephon Wolfe is a 25 year old otherwise healthy male who presents to the Emergency Department with difficulty sleeping for the last 2 weeks or so.  Notes some increased anxiety after her first Covid vaccination 10 days ago.  Describes difficulty with both sleep onset as well as sleep maintenance.  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  Drinks tea throughout the day.  Use melatonin.  Denies any drugs or alcohol.  Again somewhat anxious over recent Covid vaccination-concerned something bad will happen with his heart.  Otherwise believes he does not have any issues with anxiety or excessive worry.    No fevers or chills chest pain or shortness of breath.  No abdominal pain.  No nausea.    Past Medical History  History reviewed. No pertinent past medical history.  History reviewed. No pertinent surgical history.  acetaminophen (TYLENOL) 325 MG tablet  ergocalciferol (ERGOCALCIFEROL) 1.25 MG (24697 UT) capsule  ibuprofen (ADVIL/MOTRIN) 600 MG tablet  diphenhydrAMINE (BENADRYL) 25 MG tablet      No Known Allergies  Family History  History reviewed. No pertinent family history.  Social History   Social History     Tobacco Use     Smoking status: Never Smoker     Smokeless tobacco: Never Used   Substance Use Topics     Alcohol use: No     Drug use: No      Past medical history, past surgical history, medications, allergies, family history, and social history were reviewed with the patient. No additional pertinent items.       Review of Systems    10 point review of symptoms was performed and is negative except as noted above.   A complete review of systems was performed with  pertinent positives and negatives noted in the HPI, and all other systems negative.    Physical Exam   BP: 107/71  Pulse: 78  Temp: 96.1  F (35.6  C)  Resp: 16  Weight: 66.7 kg (147 lb)  SpO2: 99 %  Physical Exam  GEN: Well appearing, non toxic, cooperative and conversant.   HEENT: The head is normocephalic and atraumatic. Pupils are equal round and reactive to light. Extraocular motions are intact. There is no facial swelling. The neck is nontender and supple.   CV: Regular rate and rhythm without murmurs rubs or gallops. 2+ radial pulses bilaterally.  PULM: Clear to auscultation bilaterally.  ABD: Soft, nontender, nondistended.   EXT: Full range of motion.  No edema.  NEURO: Cranial nerves II through XII are intact and symmetric. Bilateral upper and lower extremities grossly show full range of motion without any focal deficits.   SKIN: No rashes, ecchymosis, or lacerations  PSYCH: Calm and cooperative, interactive.     ED Course      Procedures               No results found for any visits on 06/13/21.  Medications - No data to display     Assessments & Plan (with Medical Decision Making)   25-year-old otherwise healthy male presenting with difficulty sleeping.  DDx includes issues with sleep onset, sleep maintenance, excessive caffeine intake, thyroid dysfunction, excessive worry/anxiety, poor sleep hygiene, among other factors.    Overall the patient appears clinically well.  His difficulty with sleep is likely multifactorial.  Given his clinical appearance and vital signs, my suspicion for significant hyperthyroid state is low.  We discussed a variety of behavioral modifications to start with to improve his sleep duration and quality.  He is very willing to try these and they have been outlined in the discharge instructions.  He will follow-up with his primary care doctor and return to the emergency department for any worsening symptoms.  Patient agreeable to this plan.    - Patient agrees to our plan and is  ready and eager for discharge. Care plan, follow up plan, and reasons to return immediately to the ED were dicussed in detail and summarized as noted in the discharge instructions.      I have reviewed the nursing notes. I have reviewed the findings, diagnosis, plan and need for follow up with the patient.    Current Discharge Medication List          Final diagnoses:   Insomnia, unspecified type   Anxiety       --  Alfredo Villegas MD    [facility] EMERGENCY DEPARTMENT  6/13/2021     Alfredo Villegas MD  06/13/21 0410

## 2022-03-01 ENCOUNTER — HOSPITAL ENCOUNTER (EMERGENCY)
Facility: CLINIC | Age: 27
Discharge: HOME | End: 2022-03-01
Attending: EMERGENCY MEDICINE | Admitting: EMERGENCY MEDICINE
Payer: COMMERCIAL

## 2022-03-01 VITALS
TEMPERATURE: 98.1 F | HEART RATE: 62 BPM | OXYGEN SATURATION: 100 % | WEIGHT: 164.6 LBS | BODY MASS INDEX: 25.03 KG/M2 | SYSTOLIC BLOOD PRESSURE: 100 MMHG | RESPIRATION RATE: 16 BRPM | DIASTOLIC BLOOD PRESSURE: 52 MMHG

## 2022-03-01 DIAGNOSIS — G47.00 INSOMNIA, UNSPECIFIED TYPE: ICD-10-CM

## 2022-03-01 DIAGNOSIS — R51.9 NONINTRACTABLE HEADACHE, UNSPECIFIED CHRONICITY PATTERN, UNSPECIFIED HEADACHE TYPE: ICD-10-CM

## 2022-03-01 PROCEDURE — 99284 EMERGENCY DEPT VISIT MOD MDM: CPT | Performed by: EMERGENCY MEDICINE

## 2022-03-01 PROCEDURE — 96372 THER/PROPH/DIAG INJ SC/IM: CPT | Performed by: EMERGENCY MEDICINE

## 2022-03-01 PROCEDURE — 250N000011 HC RX IP 250 OP 636: Performed by: EMERGENCY MEDICINE

## 2022-03-01 RX ORDER — TRAZODONE HYDROCHLORIDE 100 MG/1
100 TABLET ORAL AT BEDTIME
Qty: 30 TABLET | Refills: 0 | Status: SHIPPED | OUTPATIENT
Start: 2022-03-01

## 2022-03-01 RX ORDER — KETOROLAC TROMETHAMINE 30 MG/ML
60 INJECTION, SOLUTION INTRAMUSCULAR; INTRAVENOUS ONCE
Status: COMPLETED | OUTPATIENT
Start: 2022-03-01 | End: 2022-03-01

## 2022-03-01 RX ORDER — KETOROLAC TROMETHAMINE 30 MG/ML
60 INJECTION, SOLUTION INTRAMUSCULAR; INTRAVENOUS ONCE
Status: DISCONTINUED | OUTPATIENT
Start: 2022-03-01 | End: 2022-03-01

## 2022-03-01 RX ADMIN — KETOROLAC TROMETHAMINE 60 MG: 30 INJECTION, SOLUTION INTRAMUSCULAR at 08:06

## 2022-03-01 NOTE — ED PROVIDER NOTES
ED Provider Note  [facility]      History     Chief Complaint   Patient presents with     Insomnia     Pt states that he has been working the night shift and cannot sleep. He has an intermittent headache     HPI  Stephon Wolfe is a 26 year old male who presents to the emergency department complaining of difficulty sleeping.  He has been working the night shift for the past 2 months and states he has been having a very difficult time getting to sleep and this seems to be worsening lately.  He reports some muscular pain as well as a headache which is similar to headaches he has had before.  No fevers or vomiting.  He is requesting something to help him sleep.  He has been using melatonin to no effect.    Past Medical History  No past medical history on file.  No past surgical history on file.  acetaminophen (TYLENOL) 325 MG tablet  diphenhydrAMINE (BENADRYL) 25 MG tablet  ergocalciferol (ERGOCALCIFEROL) 1.25 MG (52875 UT) capsule  ibuprofen (ADVIL/MOTRIN) 600 MG tablet      No Known Allergies  Family History  No family history on file.  Social History   Social History     Tobacco Use     Smoking status: Never Smoker     Smokeless tobacco: Never Used   Substance Use Topics     Alcohol use: No     Drug use: No      Past medical history, past surgical history, medications, allergies, family history, and social history were reviewed with the patient. No additional pertinent items.       Review of Systems  A complete review of systems was performed with pertinent positives and negatives noted in the HPI, and all other systems negative.    Physical Exam   BP: 100/52  Pulse: 62  Temp: 98.1  F (36.7  C)  Resp: 16  Weight: 74.7 kg (164 lb 9.6 oz)  SpO2: 100 %  Physical Exam  Vitals and nursing note reviewed.   Constitutional:       General: He is not in acute distress.     Appearance: He is well-developed. He is not ill-appearing or toxic-appearing.   HENT:      Head: Normocephalic and  atraumatic.   Eyes:      General: No scleral icterus.     Pupils: Pupils are equal, round, and reactive to light.   Neck:      Comments: No meningeal signs  Cardiovascular:      Rate and Rhythm: Normal rate.   Pulmonary:      Effort: Pulmonary effort is normal. No respiratory distress.   Musculoskeletal:      Cervical back: Normal range of motion and neck supple.   Skin:     General: Skin is warm and dry.      Coloration: Skin is not pale.      Findings: No rash.   Neurological:      General: No focal deficit present.      Mental Status: He is alert and oriented to person, place, and time.      Cranial Nerves: Cranial nerves are intact.      Sensory: No sensory deficit.      Motor: Motor function is intact.      Gait: Gait is intact.   Psychiatric:         Behavior: Behavior normal.         ED Course      Procedures                     No results found for any visits on 03/01/22.  Medications   ketorolac (TORADOL) injection 60 mg (has no administration in time range)   ketorolac (TORADOL) injection 60 mg (has no administration in time range)        Assessments & Plan (with Medical Decision Making)     This patient presented to the emergency department with complaints of typical headache.  There is nothing in his history or on his physical exam to suggest more serious causes of headache such as subarachnoid hemorrhage, subdural or epidural hematoma, intracranial mass, increased intracranial pressure, meningitis or encephalitis, temporal arteritis, acute angle-closure glaucoma or venous sinus thrombosis. He was treated with 60 mg of IM Toradol and was discharged with a prescription for trazodone to help with sleep.  He was encouraged to follow-up with his clinic if he continues to have problems with insomnia.     I have reviewed the nursing notes. I have reviewed the findings, diagnosis, plan and need for follow up with the patient.    New Prescriptions    No medications on file       Final diagnoses:   Nonintractable  headache, unspecified chronicity pattern, unspecified headache type   Insomnia, unspecified type       --  Last HUNG [facility] EMERGENCY DEPARTMENT  3/1/2022     Last Balderrama MD  03/01/22 0803

## 2022-03-01 NOTE — DISCHARGE INSTRUCTIONS
Please make an appointment to follow up with Your Primary Care Provider if not improving.    Trazodone, as directed, for sleep.    Return to the emergency department for any problems.

## 2022-03-12 ENCOUNTER — APPOINTMENT (OUTPATIENT)
Dept: MRI IMAGING | Facility: CLINIC | Age: 27
End: 2022-03-12
Payer: COMMERCIAL

## 2022-03-12 ENCOUNTER — APPOINTMENT (OUTPATIENT)
Dept: GENERAL RADIOLOGY | Facility: CLINIC | Age: 27
End: 2022-03-12
Payer: COMMERCIAL

## 2022-03-12 ENCOUNTER — APPOINTMENT (OUTPATIENT)
Dept: CT IMAGING | Facility: CLINIC | Age: 27
End: 2022-03-12
Attending: EMERGENCY MEDICINE
Payer: COMMERCIAL

## 2022-03-12 ENCOUNTER — HOSPITAL ENCOUNTER (INPATIENT)
Facility: CLINIC | Age: 27
LOS: 5 days | Discharge: HOME | End: 2022-03-17
Attending: EMERGENCY MEDICINE | Admitting: STUDENT IN AN ORGANIZED HEALTH CARE EDUCATION/TRAINING PROGRAM
Payer: COMMERCIAL

## 2022-03-12 DIAGNOSIS — I77.6 AORTITIS (H): ICD-10-CM

## 2022-03-12 DIAGNOSIS — Z22.7 TB LUNG, LATENT: Primary | ICD-10-CM

## 2022-03-12 DIAGNOSIS — Z11.52 ENCOUNTER FOR SCREENING LABORATORY TESTING FOR SEVERE ACUTE RESPIRATORY SYNDROME CORONAVIRUS 2 (SARS-COV-2): ICD-10-CM

## 2022-03-12 LAB
ALBUMIN SERPL-MCNC: 4.1 G/DL (ref 3.4–5)
ALBUMIN UR-MCNC: 20 MG/DL
ALP SERPL-CCNC: 100 U/L (ref 40–150)
ALT SERPL W P-5'-P-CCNC: 19 U/L (ref 0–70)
ANION GAP SERPL CALCULATED.3IONS-SCNC: 3 MMOL/L (ref 3–14)
APPEARANCE UR: CLEAR
AST SERPL W P-5'-P-CCNC: 18 U/L (ref 0–45)
BASOPHILS # BLD AUTO: 0 10E3/UL (ref 0–0.2)
BASOPHILS NFR BLD AUTO: 0 %
BILIRUB SERPL-MCNC: 0.5 MG/DL (ref 0.2–1.3)
BILIRUB UR QL STRIP: NEGATIVE
BUN SERPL-MCNC: 10 MG/DL (ref 7–30)
CALCIUM SERPL-MCNC: 9.2 MG/DL (ref 8.5–10.1)
CHLORIDE BLD-SCNC: 103 MMOL/L (ref 94–109)
CO2 SERPL-SCNC: 32 MMOL/L (ref 20–32)
COLOR UR AUTO: YELLOW
CREAT BLD-MCNC: 1 MG/DL (ref 0.7–1.3)
CREAT SERPL-MCNC: 0.83 MG/DL (ref 0.66–1.25)
CRP SERPL-MCNC: 6.3 MG/L (ref 0–8)
EOSINOPHIL # BLD AUTO: 0.1 10E3/UL (ref 0–0.7)
EOSINOPHIL NFR BLD AUTO: 1 %
ERYTHROCYTE [DISTWIDTH] IN BLOOD BY AUTOMATED COUNT: 12.4 % (ref 10–15)
ERYTHROCYTE [SEDIMENTATION RATE] IN BLOOD BY WESTERGREN METHOD: 8 MM/HR (ref 0–15)
GFR SERPL CREATININE-BSD FRML MDRD: >60 ML/MIN/1.73M2
GFR SERPL CREATININE-BSD FRML MDRD: >90 ML/MIN/1.73M2
GLUCOSE BLD-MCNC: 88 MG/DL (ref 70–99)
GLUCOSE UR STRIP-MCNC: NEGATIVE MG/DL
HCT VFR BLD AUTO: 45.7 % (ref 40–53)
HGB BLD-MCNC: 15.5 G/DL (ref 13.3–17.7)
HGB UR QL STRIP: NEGATIVE
HOLD SPECIMEN: NORMAL
IMM GRANULOCYTES # BLD: 0 10E3/UL
IMM GRANULOCYTES NFR BLD: 0 %
KETONES UR STRIP-MCNC: ABNORMAL MG/DL
LEUKOCYTE ESTERASE UR QL STRIP: NEGATIVE
LIPASE SERPL-CCNC: 79 U/L (ref 73–393)
LYMPHOCYTES # BLD AUTO: 1.9 10E3/UL (ref 0.8–5.3)
LYMPHOCYTES NFR BLD AUTO: 34 %
MCH RBC QN AUTO: 29.6 PG (ref 26.5–33)
MCHC RBC AUTO-ENTMCNC: 33.9 G/DL (ref 31.5–36.5)
MCV RBC AUTO: 87 FL (ref 78–100)
MONOCYTES # BLD AUTO: 0.6 10E3/UL (ref 0–1.3)
MONOCYTES NFR BLD AUTO: 11 %
MUCOUS THREADS #/AREA URNS LPF: PRESENT /LPF
NEUTROPHILS # BLD AUTO: 3 10E3/UL (ref 1.6–8.3)
NEUTROPHILS NFR BLD AUTO: 54 %
NITRATE UR QL: NEGATIVE
NRBC # BLD AUTO: 0 10E3/UL
NRBC BLD AUTO-RTO: 0 /100
PH UR STRIP: 6 [PH] (ref 5–7)
PLATELET # BLD AUTO: 244 10E3/UL (ref 150–450)
POTASSIUM BLD-SCNC: 3.4 MMOL/L (ref 3.4–5.3)
PROT SERPL-MCNC: 8.6 G/DL (ref 6.8–8.8)
RBC # BLD AUTO: 5.24 10E6/UL (ref 4.4–5.9)
RBC URINE: 1 /HPF
SARS-COV-2 RNA RESP QL NAA+PROBE: NEGATIVE
SODIUM SERPL-SCNC: 138 MMOL/L (ref 133–144)
SP GR UR STRIP: 1.03 (ref 1–1.03)
SPERM #/AREA URNS HPF: PRESENT /HPF
SQUAMOUS EPITHELIAL: <1 /HPF
T PALLIDUM AB SER QL: NONREACTIVE
UROBILINOGEN UR STRIP-MCNC: 2 MG/DL
WBC # BLD AUTO: 5.5 10E3/UL (ref 4–11)
WBC URINE: 2 /HPF

## 2022-03-12 PROCEDURE — 86803 HEPATITIS C AB TEST: CPT

## 2022-03-12 PROCEDURE — 93010 ELECTROCARDIOGRAM REPORT: CPT | Performed by: INTERNAL MEDICINE

## 2022-03-12 PROCEDURE — 74185 MRA ABD W OR W/O CNTRST: CPT | Mod: 26 | Performed by: RADIOLOGY

## 2022-03-12 PROCEDURE — 96361 HYDRATE IV INFUSION ADD-ON: CPT | Performed by: EMERGENCY MEDICINE

## 2022-03-12 PROCEDURE — 82784 ASSAY IGA/IGD/IGG/IGM EACH: CPT

## 2022-03-12 PROCEDURE — 81001 URINALYSIS AUTO W/SCOPE: CPT | Performed by: EMERGENCY MEDICINE

## 2022-03-12 PROCEDURE — 86706 HEP B SURFACE ANTIBODY: CPT

## 2022-03-12 PROCEDURE — 82565 ASSAY OF CREATININE: CPT

## 2022-03-12 PROCEDURE — 99223 1ST HOSP IP/OBS HIGH 75: CPT | Mod: GC | Performed by: INTERNAL MEDICINE

## 2022-03-12 PROCEDURE — 87340 HEPATITIS B SURFACE AG IA: CPT

## 2022-03-12 PROCEDURE — 80053 COMPREHEN METABOLIC PANEL: CPT | Performed by: EMERGENCY MEDICINE

## 2022-03-12 PROCEDURE — 86038 ANTINUCLEAR ANTIBODIES: CPT

## 2022-03-12 PROCEDURE — C9803 HOPD COVID-19 SPEC COLLECT: HCPCS | Performed by: EMERGENCY MEDICINE

## 2022-03-12 PROCEDURE — 96374 THER/PROPH/DIAG INJ IV PUSH: CPT | Mod: 59 | Performed by: EMERGENCY MEDICINE

## 2022-03-12 PROCEDURE — 83690 ASSAY OF LIPASE: CPT | Performed by: EMERGENCY MEDICINE

## 2022-03-12 PROCEDURE — 85025 COMPLETE CBC W/AUTO DIFF WBC: CPT | Performed by: EMERGENCY MEDICINE

## 2022-03-12 PROCEDURE — 258N000003 HC RX IP 258 OP 636: Performed by: EMERGENCY MEDICINE

## 2022-03-12 PROCEDURE — 36415 COLL VENOUS BLD VENIPUNCTURE: CPT | Performed by: STUDENT IN AN ORGANIZED HEALTH CARE EDUCATION/TRAINING PROGRAM

## 2022-03-12 PROCEDURE — 250N000011 HC RX IP 250 OP 636: Performed by: EMERGENCY MEDICINE

## 2022-03-12 PROCEDURE — 86140 C-REACTIVE PROTEIN: CPT | Performed by: EMERGENCY MEDICINE

## 2022-03-12 PROCEDURE — 86256 FLUORESCENT ANTIBODY TITER: CPT

## 2022-03-12 PROCEDURE — 250N000009 HC RX 250: Performed by: EMERGENCY MEDICINE

## 2022-03-12 PROCEDURE — 93005 ELECTROCARDIOGRAM TRACING: CPT

## 2022-03-12 PROCEDURE — 99285 EMERGENCY DEPT VISIT HI MDM: CPT | Performed by: EMERGENCY MEDICINE

## 2022-03-12 PROCEDURE — 86780 TREPONEMA PALLIDUM: CPT

## 2022-03-12 PROCEDURE — 85652 RBC SED RATE AUTOMATED: CPT | Performed by: EMERGENCY MEDICINE

## 2022-03-12 PROCEDURE — 74177 CT ABD & PELVIS W/CONTRAST: CPT

## 2022-03-12 PROCEDURE — 99285 EMERGENCY DEPT VISIT HI MDM: CPT | Mod: 25 | Performed by: EMERGENCY MEDICINE

## 2022-03-12 PROCEDURE — 255N000002 HC RX 255 OP 636: Performed by: STUDENT IN AN ORGANIZED HEALTH CARE EDUCATION/TRAINING PROGRAM

## 2022-03-12 PROCEDURE — 99223 1ST HOSP IP/OBS HIGH 75: CPT | Mod: AI | Performed by: STUDENT IN AN ORGANIZED HEALTH CARE EDUCATION/TRAINING PROGRAM

## 2022-03-12 PROCEDURE — 120N000002 HC R&B MED SURG/OB UMMC

## 2022-03-12 PROCEDURE — 96375 TX/PRO/DX INJ NEW DRUG ADDON: CPT | Performed by: EMERGENCY MEDICINE

## 2022-03-12 PROCEDURE — 36415 COLL VENOUS BLD VENIPUNCTURE: CPT | Performed by: EMERGENCY MEDICINE

## 2022-03-12 PROCEDURE — 74185 MRA ABD W OR W/O CNTRST: CPT

## 2022-03-12 PROCEDURE — 71046 X-RAY EXAM CHEST 2 VIEWS: CPT

## 2022-03-12 PROCEDURE — 87040 BLOOD CULTURE FOR BACTERIA: CPT | Performed by: STUDENT IN AN ORGANIZED HEALTH CARE EDUCATION/TRAINING PROGRAM

## 2022-03-12 PROCEDURE — 71046 X-RAY EXAM CHEST 2 VIEWS: CPT | Mod: 26 | Performed by: STUDENT IN AN ORGANIZED HEALTH CARE EDUCATION/TRAINING PROGRAM

## 2022-03-12 PROCEDURE — A9585 GADOBUTROL INJECTION: HCPCS | Performed by: STUDENT IN AN ORGANIZED HEALTH CARE EDUCATION/TRAINING PROGRAM

## 2022-03-12 PROCEDURE — U0005 INFEC AGEN DETEC AMPLI PROBE: HCPCS | Performed by: EMERGENCY MEDICINE

## 2022-03-12 RX ORDER — AMOXICILLIN 250 MG
2 CAPSULE ORAL 2 TIMES DAILY PRN
Status: DISCONTINUED | OUTPATIENT
Start: 2022-03-12 | End: 2022-03-17 | Stop reason: HOSPADM

## 2022-03-12 RX ORDER — GADOBUTROL 604.72 MG/ML
10 INJECTION INTRAVENOUS ONCE
Status: COMPLETED | OUTPATIENT
Start: 2022-03-12 | End: 2022-03-12

## 2022-03-12 RX ORDER — KETOROLAC TROMETHAMINE 15 MG/ML
15 INJECTION, SOLUTION INTRAMUSCULAR; INTRAVENOUS ONCE
Status: COMPLETED | OUTPATIENT
Start: 2022-03-12 | End: 2022-03-12

## 2022-03-12 RX ORDER — ONDANSETRON 2 MG/ML
4 INJECTION INTRAMUSCULAR; INTRAVENOUS EVERY 6 HOURS PRN
Status: DISCONTINUED | OUTPATIENT
Start: 2022-03-12 | End: 2022-03-17 | Stop reason: HOSPADM

## 2022-03-12 RX ORDER — ONDANSETRON 2 MG/ML
4 INJECTION INTRAMUSCULAR; INTRAVENOUS EVERY 30 MIN PRN
Status: DISCONTINUED | OUTPATIENT
Start: 2022-03-12 | End: 2022-03-12 | Stop reason: DRUGHIGH

## 2022-03-12 RX ORDER — IOPAMIDOL 755 MG/ML
100 INJECTION, SOLUTION INTRAVASCULAR ONCE
Status: COMPLETED | OUTPATIENT
Start: 2022-03-12 | End: 2022-03-12

## 2022-03-12 RX ORDER — SODIUM CHLORIDE 9 MG/ML
INJECTION, SOLUTION INTRAVENOUS CONTINUOUS
Status: DISCONTINUED | OUTPATIENT
Start: 2022-03-12 | End: 2022-03-12

## 2022-03-12 RX ORDER — ONDANSETRON 4 MG/1
4 TABLET, ORALLY DISINTEGRATING ORAL EVERY 6 HOURS PRN
Status: DISCONTINUED | OUTPATIENT
Start: 2022-03-12 | End: 2022-03-17 | Stop reason: HOSPADM

## 2022-03-12 RX ORDER — ACETAMINOPHEN 325 MG/1
975 TABLET ORAL EVERY 8 HOURS PRN
Status: DISCONTINUED | OUTPATIENT
Start: 2022-03-12 | End: 2022-03-17 | Stop reason: HOSPADM

## 2022-03-12 RX ORDER — AMOXICILLIN 250 MG
1 CAPSULE ORAL 2 TIMES DAILY PRN
Status: DISCONTINUED | OUTPATIENT
Start: 2022-03-12 | End: 2022-03-17 | Stop reason: HOSPADM

## 2022-03-12 RX ADMIN — SODIUM CHLORIDE: 9 INJECTION, SOLUTION INTRAVENOUS at 18:34

## 2022-03-12 RX ADMIN — SODIUM CHLORIDE: 9 INJECTION, SOLUTION INTRAVENOUS at 01:56

## 2022-03-12 RX ADMIN — GADOBUTROL 10 ML: 604.72 INJECTION INTRAVENOUS at 18:02

## 2022-03-12 RX ADMIN — IOPAMIDOL 78 ML: 755 INJECTION, SOLUTION INTRAVENOUS at 01:25

## 2022-03-12 RX ADMIN — SODIUM CHLORIDE 1000 ML: 9 INJECTION, SOLUTION INTRAVENOUS at 01:05

## 2022-03-12 RX ADMIN — SODIUM CHLORIDE: 9 INJECTION, SOLUTION INTRAVENOUS at 08:47

## 2022-03-12 RX ADMIN — ONDANSETRON 4 MG: 2 INJECTION INTRAMUSCULAR; INTRAVENOUS at 01:07

## 2022-03-12 RX ADMIN — SODIUM CHLORIDE 59 ML: 9 INJECTION, SOLUTION INTRAVENOUS at 01:27

## 2022-03-12 RX ADMIN — KETOROLAC TROMETHAMINE 15 MG: 15 INJECTION, SOLUTION INTRAMUSCULAR; INTRAVENOUS at 01:09

## 2022-03-12 RX ADMIN — SODIUM CHLORIDE: 9 INJECTION, SOLUTION INTRAVENOUS at 03:36

## 2022-03-12 ASSESSMENT — ENCOUNTER SYMPTOMS
COUGH: 0
FREQUENCY: 0
BACK PAIN: 0
FEVER: 0
HEMATURIA: 0
CHEST TIGHTNESS: 0
HEADACHES: 0
DIARRHEA: 0
CONFUSION: 0
DIFFICULTY URINATING: 0
BLOOD IN STOOL: 0
ARTHRALGIAS: 0
PALPITATIONS: 0
DIZZINESS: 0
MYALGIAS: 0
COLOR CHANGE: 0
WEAKNESS: 0
ABDOMINAL PAIN: 1
EYE PAIN: 0
FATIGUE: 0
SHORTNESS OF BREATH: 0
NECK PAIN: 0
DYSURIA: 0
VOMITING: 1
SORE THROAT: 0
CONSTIPATION: 0
CHILLS: 0
NAUSEA: 1
ABDOMINAL DISTENTION: 0

## 2022-03-12 ASSESSMENT — ACTIVITIES OF DAILY LIVING (ADL)
ADLS_ACUITY_SCORE: 7
ADLS_ACUITY_SCORE: 4
ADLS_ACUITY_SCORE: 7
ADLS_ACUITY_SCORE: 4
ADLS_ACUITY_SCORE: 7
ADLS_ACUITY_SCORE: 7
ADLS_ACUITY_SCORE: 4
ADLS_ACUITY_SCORE: 7
ADLS_ACUITY_SCORE: 4
ADLS_ACUITY_SCORE: 7
ADLS_ACUITY_SCORE: 4

## 2022-03-12 NOTE — ED NOTES
[facility]   ED Nurse to Floor Handoff     Stephon Wolfe is a 26 year old male who speaks Congolese and lives with family members,  in a home  They arrived in the ED by car from home    ED Chief Complaint: Nausea & Vomiting (Ate something at 2100. Nauseated and had emesis x 3.)    ED Dx;   Final diagnoses:   Aortitis (H)         Needed?: No    Allergies: No Known Allergies.  Past Medical Hx: History reviewed. No pertinent past medical history.   Baseline Mental status: WDL  Current Mental Status changes: at basesline    Infection present or suspected this encounter: no  Sepsis suspected: No  Isolation type: No active isolations  Patient tested for COVID 19 prior to admission: YES     Activity level - Baseline/Home:  Independent  Activity Level - Current:   Independent    Bariatric equipment needed?: No    In the ED these meds were given:   Medications   0.9% sodium chloride BOLUS (0 mLs Intravenous Stopped 3/12/22 0157)     Followed by   sodium chloride 0.9% infusion ( Intravenous New Bag 3/12/22 0336)   ondansetron (ZOFRAN) injection 4 mg (4 mg Intravenous Given 3/12/22 0107)   ketorolac (TORADOL) injection 15 mg (15 mg Intravenous Given 3/12/22 0109)   sodium chloride 0.9 % bag 500mL for CT scan flush use (59 mLs As instructed Given 3/12/22 0127)   iopamidol (ISOVUE-370) solution 100 mL (78 mLs Intravenous Given 3/12/22 0125)       Drips running?  Yes    Home pump  No    Current LDAs  Peripheral IV 03/12/22 Right Upper arm (Active)   Site Assessment WDL 03/12/22 0104   Line Status Infusing 03/12/22 0104   Dressing Intervention New dressing  03/12/22 0104   Phlebitis Scale 0-->no symptoms 03/12/22 0104   Infiltration Scale 0 03/12/22 0104   Infiltration Site Treatment Method  None 03/12/22 0104   Number of days: 0       Labs results:   Labs Ordered and Resulted from Time of ED Arrival to Time of ED Departure   ROUTINE UA WITH MICROSCOPIC REFLEX TO CULTURE -  Abnormal       Result Value    Color Urine Yellow      Appearance Urine Clear      Glucose Urine Negative      Bilirubin Urine Negative      Ketones Urine Trace (*)     Specific Gravity Urine 1.027      Blood Urine Negative      pH Urine 6.0      Protein Albumin Urine 20  (*)     Urobilinogen Urine 2.0      Nitrite Urine Negative      Leukocyte Esterase Urine Negative      Mucus Urine Present (*)     Sperm Urine Present (*)     RBC Urine 1      WBC Urine 2      Squamous Epithelials Urine <1     COMPREHENSIVE METABOLIC PANEL - Normal    Sodium 138      Potassium 3.4      Chloride 103      Carbon Dioxide (CO2) 32      Anion Gap 3      Urea Nitrogen 10      Creatinine 0.83      Calcium 9.2      Glucose 88      Alkaline Phosphatase 100      AST 18      ALT 19      Protein Total 8.6      Albumin 4.1      Bilirubin Total 0.5      GFR Estimate >90     LIPASE - Normal    Lipase 79     ISTAT CREATININE POCT - Normal    Creatinine POCT 1.0      GFR, ESTIMATED POCT >60     ERYTHROCYTE SEDIMENTATION RATE AUTO - Normal    Erythrocyte Sedimentation Rate 8     CRP INFLAMMATION - Normal    CRP Inflammation 6.3     COVID-19 VIRUS (CORONAVIRUS) BY PCR - Normal    SARS CoV2 PCR Negative     CBC WITH PLATELETS AND DIFFERENTIAL    WBC Count 5.5      RBC Count 5.24      Hemoglobin 15.5      Hematocrit 45.7      MCV 87      MCH 29.6      MCHC 33.9      RDW 12.4      Platelet Count 244      % Neutrophils 54      % Lymphocytes 34      % Monocytes 11      % Eosinophils 1      % Basophils 0      % Immature Granulocytes 0      NRBCs per 100 WBC 0      Absolute Neutrophils 3.0      Absolute Lymphocytes 1.9      Absolute Monocytes 0.6      Absolute Eosinophils 0.1      Absolute Basophils 0.0      Absolute Immature Granulocytes 0.0      Absolute NRBCs 0.0     TREPONEMA ABS W REFLEX TO RPR AND TITER   HEPATITIS B SURFACE ANTIBODY   HEPATITIS B SURFACE ANTIGEN   HEPATITIS C ANTIBODY   ANTI NUCLEAR REGINA IGG BY IFA WITH REFLEX   ANCA IGG BY IFA WITH  "REFLEX TO TITER   IMMUNOGLOBULINS A G AND M   IMMUNOGLOBULIN G SUBCLASSES       Imaging Studies:   Recent Results (from the past 24 hour(s))   CT Abdomen Pelvis w Contrast    Narrative    EXAM: CT ABDOMEN PELVIS W CONTRAST  LOCATION: [facility]  DATE/TIME: 3/12/2022 1:12 AM    INDICATION: RLQ abdominal pain  COMPARISON: None.  TECHNIQUE: CT scan of the abdomen and pelvis was performed following injection of IV contrast. Multiplanar reformats were obtained. Dose reduction techniques were used.  CONTRAST: 78mL's iso 370    FINDINGS:   LOWER CHEST: Normal.    HEPATOBILIARY: Normal.    PANCREAS: Normal.    SPLEEN: Normal.    ADRENAL GLANDS: Normal.    KIDNEYS/BLADDER: Normal.    BOWEL: Normal caliber appendix. No bowel obstruction or pneumatosis.    LYMPH NODES: Normal.    VASCULATURE: Significant wall thickening involving the abdominal aorta with adjacent inflammatory stranding, beginning at the level of the origin of the SMA, extending past the renal arteries, and resolving approximately at the level of the MORGAN origin   (approximately 7.5 cm in craniocaudal extent). There is mild thickening involving the origin of both renal arteries. Wall thickening results in between 25 and 50% luminal narrowing in the aorta. No retroperitoneal hemorrhage.    PELVIC ORGANS: No free fluid    MUSCULOSKELETAL: No acute bony abnormality.      Impression    IMPRESSION:   1.  CT findings compatible with aortitis.    Impression was called to Dr. Goltz by Dr. Morro Chapman on 3/12/2022 1:44 AM.       Recent vital signs:   BP 97/67   Pulse 70   Temp 97.5  F (36.4  C) (Oral)   Resp 16   Ht 1.727 m (5' 8\")   Wt 72.3 kg (159 lb 8 oz)   SpO2 100%   BMI 24.25 kg/m      [place] Coma Scale Score: 15 (03/12/22 0054)       Cardiac Rhythm: Other  Pt needs tele? No  Skin/wound Issues: None    Code Status: Full Code    Pain control: pt had none    Nausea control: pt had none    Abnormal " labs/tests/findings requiring intervention: none    Family present during ED course? No   Family Comments/Social Situation comments: none    Tasks needing completion: None    Queta Molina RN  Former[facility] -- [phone] [facility]

## 2022-03-12 NOTE — ED PROVIDER NOTES
ED Provider Note  [facility]      History     Chief Complaint   Patient presents with     Nausea & Vomiting     Ate something at 2100. Nauseated and had emesis x 3.     HPI  Stephon Wolfe is a 26 year old male with a PMH of prediabetes, tension headache, GERD and insomnia who presents to the ED today complaining of abdominal pain and vomiting.  Patient states he ate dinner around 7 PM tonight and began vomiting at around 9 PM tonight.  He endorses diffuse right-sided abdominal pain that comes and goes.  He states that nothing makes the pain better or worse.  He endorses 3 episodes of vomiting tonight.  He states has been feeling fine over the past few days.  Here in the ED he states he still feels nauseous.  Patient denies bloody emesis, fever, chills, cough, dysuria, hematuria, urgency, frequency, diarrhea, constipation, bloody stool, chronic medical issues, daily medication use or any previous abdominal surgeries.    Patient was recently seen on 3/1/2022 here in the ED complaining of insomnia and headache. Treated with 60mg IM Toradol and discharged with prescription for trazodone.     Past Medical History  History reviewed. No pertinent past medical history.  History reviewed. No pertinent surgical history.  acetaminophen (TYLENOL) 325 MG tablet  diphenhydrAMINE (BENADRYL) 25 MG tablet  ergocalciferol (ERGOCALCIFEROL) 1.25 MG (05070 UT) capsule  ibuprofen (ADVIL/MOTRIN) 600 MG tablet  traZODone (DESYREL) 100 MG tablet      No Known Allergies  Family History  History reviewed. No pertinent family history.  Social History   Social History     Tobacco Use     Smoking status: Never Smoker     Smokeless tobacco: Never Used   Substance Use Topics     Alcohol use: No     Drug use: No      Past medical history, past surgical history, medications, allergies, family history, and social history were reviewed with the patient. No additional pertinent items.       Review of Systems  "  Constitutional: Negative for chills, fatigue and fever.   HENT: Negative for congestion and sore throat.    Eyes: Negative for pain and visual disturbance.   Respiratory: Negative for cough, chest tightness and shortness of breath.    Cardiovascular: Negative for chest pain and palpitations.   Gastrointestinal: Positive for abdominal pain, nausea and vomiting. Negative for abdominal distention, blood in stool, constipation and diarrhea.   Genitourinary: Negative for difficulty urinating, dysuria, frequency, hematuria and urgency.   Musculoskeletal: Negative for arthralgias, back pain, myalgias and neck pain.   Skin: Negative for color change and rash.   Neurological: Negative for dizziness, weakness and headaches.   Psychiatric/Behavioral: Negative for confusion.     A complete review of systems was performed with pertinent positives and negatives noted in the HPI, and all other systems negative.    Physical Exam   BP: 97/67  Pulse: 70  Temp: 97.5  F (36.4  C)  Resp: 16  Height: 172.7 cm (5' 8\")  Weight: 72.3 kg (159 lb 8 oz)  SpO2: 100 %  Physical Exam  Vitals and nursing note reviewed.   Constitutional:       General: He is not in acute distress.     Appearance: Normal appearance. He is not ill-appearing or toxic-appearing.   HENT:      Head: Normocephalic and atraumatic.      Nose: Nose normal.      Mouth/Throat:      Mouth: Mucous membranes are moist.   Eyes:      Pupils: Pupils are equal, round, and reactive to light.   Cardiovascular:      Rate and Rhythm: Normal rate.      Pulses: Normal pulses.      Heart sounds: Normal heart sounds.   Pulmonary:      Effort: Pulmonary effort is normal. No respiratory distress.      Breath sounds: Normal breath sounds.   Abdominal:      General: Abdomen is flat. There is no distension.   Musculoskeletal:         General: No swelling or deformity. Normal range of motion.      Cervical back: Normal range of motion. No rigidity.   Skin:     General: Skin is warm.      " Capillary Refill: Capillary refill takes less than 2 seconds.   Neurological:      Mental Status: He is alert and oriented to person, place, and time.   Psychiatric:         Mood and Affect: Mood normal.         ED Course     ED Course as of 03/12/22 0235   Sat Mar 12, 2022   0032 Patient seen and evaluated by me.  Presents the ED with right-sided abdominal pain and 3 episodes of nausea.  Differential diagnosis includes viral gastroenteritis, foodborne illness, gastritis, cholecystitis, pancreatitis, diverticulitis, appendicitis, UTI, ureterolithiasis   0032 Plan for CBC, CMP, lipase, urinalysis, CT abdomen pelvis with IV contrast to rule out appendicitis.   0033 Meds administered: 50 mg IV Toradol, 4 mg IV Zofran, 1 L IV fluids   0033 BP: 97/67   0033 Temp: 97.5  F (36.4  C)   0033 Pulse: 70   0033 Resp: 16   0033 SpO2: 100 %   0217 CT scan negative for appendicitis.  Does show acute aortitis.  Patient reassessed.  Pain is improved.  Lower extremities are neurovascularly intact.  Case discussed with vascular surgery who recommended medicine admission with rheumatology consult.  Case discussed with the rheumatologist on staff.  They will see patient in consult.  Additional recommendations are pending.   0226 WBC: 5.5   0226 Hemoglobin: 15.5   0227 Creatinine: 0.83   0227 Lipase: 79   0227 AST: 18   0227 ALT: 19   0227 Bilirubin Total: 0.5   0231 Re-discussed with rheumatology.  No indication for acute administration of steroids.  No further testing at this point. We will continue with the medicine admission.  Rheumatology will see patient in the morning.    12:29 AM  The patient was seen and examined by Fredy Asencio DO in Room ED05.     Procedures       The medical record was reviewed and interpreted.  Current labs reviewed and interpreted.  Previous labs reviewed and interpreted.  Current images reviewed and interpreted: ct a/p.              Results for orders placed or performed during the hospital encounter of  03/12/22   CT Abdomen Pelvis w Contrast     Status: None    Narrative    EXAM: CT ABDOMEN PELVIS W CONTRAST  LOCATION: [facility]  DATE/TIME: 3/12/2022 1:12 AM    INDICATION: RLQ abdominal pain  COMPARISON: None.  TECHNIQUE: CT scan of the abdomen and pelvis was performed following injection of IV contrast. Multiplanar reformats were obtained. Dose reduction techniques were used.  CONTRAST: 78mL's iso 370    FINDINGS:   LOWER CHEST: Normal.    HEPATOBILIARY: Normal.    PANCREAS: Normal.    SPLEEN: Normal.    ADRENAL GLANDS: Normal.    KIDNEYS/BLADDER: Normal.    BOWEL: Normal caliber appendix. No bowel obstruction or pneumatosis.    LYMPH NODES: Normal.    VASCULATURE: Significant wall thickening involving the abdominal aorta with adjacent inflammatory stranding, beginning at the level of the origin of the SMA, extending past the renal arteries, and resolving approximately at the level of the MORGAN origin   (approximately 7.5 cm in craniocaudal extent). There is mild thickening involving the origin of both renal arteries. Wall thickening results in between 25 and 50% luminal narrowing in the aorta. No retroperitoneal hemorrhage.    PELVIC ORGANS: No free fluid    MUSCULOSKELETAL: No acute bony abnormality.      Impression    IMPRESSION:   1.  CT findings compatible with aortitis.    Impression was called to Dr. Goltz by Dr. Morro Chapman on 3/12/2022 1:44 AM.   Comprehensive metabolic panel     Status: Normal   Result Value Ref Range    Sodium 138 133 - 144 mmol/L    Potassium 3.4 3.4 - 5.3 mmol/L    Chloride 103 94 - 109 mmol/L    Carbon Dioxide (CO2) 32 20 - 32 mmol/L    Anion Gap 3 3 - 14 mmol/L    Urea Nitrogen 10 7 - 30 mg/dL    Creatinine 0.83 0.66 - 1.25 mg/dL    Calcium 9.2 8.5 - 10.1 mg/dL    Glucose 88 70 - 99 mg/dL    Alkaline Phosphatase 100 40 - 150 U/L    AST 18 0 - 45 U/L    ALT 19 0 - 70 U/L    Protein Total 8.6 6.8 - 8.8 g/dL    Albumin 4.1 3.4 - 5.0  g/dL    Bilirubin Total 0.5 0.2 - 1.3 mg/dL    GFR Estimate >90 >60 mL/min/1.73m2   Lipase     Status: Normal   Result Value Ref Range    Lipase 79 73 - 393 U/L   UA with Microscopic reflex to Culture     Status: Abnormal    Specimen: Urine, Clean Catch   Result Value Ref Range    Color Urine Yellow Colorless, Straw, Light Yellow, Yellow    Appearance Urine Clear Clear    Glucose Urine Negative Negative mg/dL    Bilirubin Urine Negative Negative    Ketones Urine Trace (A) Negative mg/dL    Specific Gravity Urine 1.027 1.003 - 1.035    Blood Urine Negative Negative    pH Urine 6.0 5.0 - 7.0    Protein Albumin Urine 20  (A) Negative mg/dL    Urobilinogen Urine 2.0 Normal, 2.0 mg/dL    Nitrite Urine Negative Negative    Leukocyte Esterase Urine Negative Negative    Mucus Urine Present (A) None Seen /LPF    Sperm Urine Present (A) None Seen /HPF    RBC Urine 1 <=2 /HPF    WBC Urine 2 <=5 /HPF    Squamous Epithelials Urine <1 <=1 /HPF    Narrative    Urine Culture not indicated   CBC with platelets and differential     Status: None   Result Value Ref Range    WBC Count 5.5 4.0 - 11.0 10e3/uL    RBC Count 5.24 4.40 - 5.90 10e6/uL    Hemoglobin 15.5 13.3 - 17.7 g/dL    Hematocrit 45.7 40.0 - 53.0 %    MCV 87 78 - 100 fL    MCH 29.6 26.5 - 33.0 pg    MCHC 33.9 31.5 - 36.5 g/dL    RDW 12.4 10.0 - 15.0 %    Platelet Count 244 150 - 450 10e3/uL    % Neutrophils 54 %    % Lymphocytes 34 %    % Monocytes 11 %    % Eosinophils 1 %    % Basophils 0 %    % Immature Granulocytes 0 %    NRBCs per 100 WBC 0 <1 /100    Absolute Neutrophils 3.0 1.6 - 8.3 10e3/uL    Absolute Lymphocytes 1.9 0.8 - 5.3 10e3/uL    Absolute Monocytes 0.6 0.0 - 1.3 10e3/uL    Absolute Eosinophils 0.1 0.0 - 0.7 10e3/uL    Absolute Basophils 0.0 0.0 - 0.2 10e3/uL    Absolute Immature Granulocytes 0.0 <=0.4 10e3/uL    Absolute NRBCs 0.0 10e3/uL   [place] Draw     Status: None    Narrative    The following orders were created for panel order [place]  Draw.  Procedure                               Abnormality         Status                     ---------                               -----------         ------                     Extra Red Top Tube[[id number]]                               Final result                 Please view results for these tests on the individual orders.   Extra Red Top Tube     Status: None   Result Value Ref Range    Hold Specimen JIC    Creatinine POCT     Status: Normal   Result Value Ref Range    Creatinine POCT 1.0 0.7 - 1.3 mg/dL    GFR, ESTIMATED POCT >60 >60 mL/min/1.73m2   CBC with platelets differential     Status: None    Narrative    The following orders were created for panel order CBC with platelets differential.  Procedure                               Abnormality         Status                     ---------                               -----------         ------                     CBC with platelets and d...[[id number]]                      Final result                 Please view results for these tests on the individual orders.     Medications   0.9% sodium chloride BOLUS (0 mLs Intravenous Stopped 3/12/22 0157)     Followed by   sodium chloride 0.9% infusion ( Intravenous New Bag 3/12/22 0156)   ondansetron (ZOFRAN) injection 4 mg (4 mg Intravenous Given 3/12/22 0107)   ketorolac (TORADOL) injection 15 mg (15 mg Intravenous Given 3/12/22 0109)   sodium chloride 0.9 % bag 500mL for CT scan flush use (59 mLs As instructed Given 3/12/22 0127)   iopamidol (ISOVUE-370) solution 100 mL (78 mLs Intravenous Given 3/12/22 0125)        Assessments & Plan (with Medical Decision Making)   See MDM above.  No transaminitis or bilirubin hepatobiliary pathology.  No lipase elevation to suggest acute pancreatitis.  Urinalysis is not suggestive of infection.  No leukocytosis.  Normal renal function.  Normal electrolytes.    As above, CT scan suggested acute aortitis.  Plan admit patient to the [facility] medicine service with rheumatology  consult.  All findings and plan of care discussed with the patient who understands and is agreeable to admission.  We will continue to treat pain/nausea while in the ED.    I have reviewed the nursing notes. I have reviewed the findings, diagnosis, plan and need for follow up with the patient.    New Prescriptions    No medications on file       Final diagnoses:   Aortitis (H)   IArturo am serving as a trained medical scribe to document services personally performed by Fredy Asencio DO, based on the provider's statements to me.     I, Fredy Asencio DO, was physically present and have reviewed and verified the accuracy of this note documented by Arturo Arcos.      --  Fredy Asencio DO  [facility] EMERGENCY DEPARTMENT  3/12/2022     Fredy Asencio DO  03/12/22 0312

## 2022-03-12 NOTE — ED NOTES
Update given to EVERARDO León at  on the [facility]. Given that the MRI team is not onsite on the [facility] over the weekend the rheumatology team and Dr. Adams decided to have the MRI performed at the [facility] after patient is transported to . EVERARDO León is aware that an MRI has been ordered and will be done over there and to call the Rheumatology team listed below if the order somehow does not make it over there.

## 2022-03-12 NOTE — ED NOTES
Report given to EVERARDO León at  on the [facility]. Rheumatology has asked that patient not be transported until they are able to round on him in the next hour.

## 2022-03-12 NOTE — ED NOTES
Pt A/O x 4, independent with ambulation.  Per pt he has vomited nor had any diarrhea since he came to the ED.  Pt has been able to keep fluids/ foods.  PIV infusing.  Voiding spontaneously.  Pt denies any CP or SOB. Neuro's intact.  Pt is aware that he is being admitted.

## 2022-03-12 NOTE — CONSULTS
Rheumatology Consult Note-Fellow    Stephon Wolfe MRN# [id number]   Age: 26 year old YOB: 1995     Date of Admission: 3/12/2022    Reason for consult: aortitis on CT scan     Requesting Physician: ED physician      Assessment & Plan:     ASSESSMENT:  Stephon Wolfe is a 26 year old M with a hx ofinsomnia that presents with abdominal pain that started a few hours prior to arrival, pain also associated with nausea and 3 vomiting episodes, CT scan showing significant wall thickening involving the abdominal aorta with adjacent inflammatory stranding, beginning at the level of the origin of the SMA, extending past the renal arteries, and resolving approximately at the level of the MORGAN origin.  Findings were said to be compatible with Aortitis, which possible etiologies could be large vessel vasculitidis such as GCA at this time less likely given unusual age group, and normal ESR, also Takayasu in the differential, no discrepensy on upper extremity pulses on exam, and abrupt nature of symptoms also make this less likely. Also would think of ANCA vasculitis, also no history of sinus diseases, no pulmonary or renal involvment as he is on room air, no trouble breathing and normal renal function, also normal ESR and CRP (inflammatory markers) make this less likely, did order ANCA and ALEXANDER. Pending results.  Behcets in the differential, he does endorse history of mouth ulcers in his late teen years, though to be attributed to Seroquel which he took for sleep, that resolved completely after stopping the seroquel. Other vasculitidis that we think of are relapsin polychondritis, again no clinical features to suggest this disease.  Other etiologies include connective tissue/autoimmune diseases such as rheumatoid arthritis, SLE, Spondyloarthropathies, there are no clinical signs or symptoms or physical examination findings suggesting these diseases.  Other etiologies including Sarcoidosis, he does not have  CXR on file, would get one to evaluate for any lymphadenopathy which could suggest Sarcoidosis.   Other etiologies which are at the top of our differential are infections like Viral vs Gastrointestinal or Genitourinary infections that are sometimes never identified. He did report acute symptoms after a meal from a restaurant he had never gone to.   The fact that he is better from pain perspective is reassuring that this might be the case. Other more rare infections are syphilis, which we got RPR for. Other infections we are checking for Hep B, C.   Other possible etiologies include Cogans syndrome, which usually present with fever which he does not have and eye and hearing involvement which again he does not endorse.   We were able to contact the radiology department that read the imaging, and they mentioned that it was difficult to differentiate between acute vs chronic lesions on the CT scan or Old vascular/inflammatory lesion that could possibly mimic aortitis, and recommended possibility of MRI with contrast to be able to make that differentiation, we would recommend this at this time, order for MRI of the abdomen is it.   At this time patient has been evaluated by vascular surgery and found to be neurovascularly intact, has normal vital signs including normal BP, normal heart rate and respiratory rate, is afebrile, with no leucocytosis, no abnormalities in CBC or CMP.   There is no pain at the present moment or compelling evidence to think there is an underlying disease causing this finding, therefore there is no need for acute intervention, and we might think this is a transitory findings that will resolve.     DIAGNOSIS:  1. 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 Abdominal pain   3. History of insomnia     PLAN:  Labs ordered, ALEXANDER, ANCA, Hep B, C, RPR  MRI of the abdomen      I discussed the findings and recommendations with the patient.  I communicated the assessment and plan to the  "consulting team.    Case seen and discussed with Dr. Taveras who agrees with above assessment and plan.     Thank you Fredy Asencio DO for for this interesting consult. Please contact us if there are any questions.     Jessica Simmons MD  Rheumatology Fellow    I saw this patient with the Rheumatology Fellow. I agree with the findings and recommendations. Syndrome of acute gastroenteritis/dysentery is associated with segmental abdominal aortic wall thickening, but no correlates of systemic rheumatic disease, toxin, infection, or even of systemic inflammation are present. Healed/old aortic inflammatory disease is highest on the differential diagnosis of the aortic abnormalities seen on CT scan. MRI with contrast will be more sensitive to inflammatory changes in the aortic endothelium. Therapy may be withheld pending laboratory workup for infection, IgG4-related disease, and while awaiting followup imaging.    Omid Taveras M.D.  Staff Rheumatologist, [facility]  Pager [phone]        \"This note was generated using dragon software and reviewed by myself.  Please excuse any grammatical or spelling errors above\".     HPI     Stephon Wolfe is a 26 year old M with a hx of Insomnia that presents with abdominal pain overnight that started hours prior to admission.   He reports he had dinner at around 9pm, then shortly after started having abdominal pain, mainly over the right lower quadrant that was sharp in nature. He also started having nausea, and had about 3 vomiting episodes.   He had never felt these symptoms in the past.   He did say he ate in a restaurant that he had never visited in the past.   He came to the ED, and after pain management with Toradol, his pain improved and is currently pain free.   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 He has not had them again, he denies ever having sores in his genitals.   He denies any inflammation or the eyes in the past, no history of inflammatory back pain, no dactylitis, no joint pain history, no pain in the muscles.   Denies any rashes in his skin or history of fingers turning color in the winter or summer months.   Denies fevers, chills, chest pain, shortness of breath, numbness or tingling in extremities, muscle weakness.   Denies ulcers in the nose or mouth, denies dry eyes or dry mouth, no trouble with hearing.   No family history of rheumatologic disease  Denies smoking, using drugs or alcohol.       HISTORY REVIEW:  History reviewed. No pertinent past medical history.  History reviewed. No pertinent surgical history.  History reviewed. No pertinent family history.  Social History     Socioeconomic History     Marital status: Single     Spouse name: Not on file     Number of children: Not on file     Years of education: Not on file     Highest education level: Not on file   Occupational History     Not on file   Tobacco Use     Smoking status: Never Smoker     Smokeless tobacco: Never Used   Substance and Sexual Activity     Alcohol use: No     Drug use: No     Sexual activity: Not on file   Other Topics Concern     Not on file   Social History Narrative     Not on file     Social Determinants of Health     Financial Resource Strain: Not on file   Food Insecurity: Not on file   Transportation Needs: Not on file   Physical Activity: Not on file   Stress: Not on file   Social Connections: Not on file   Intimate Partner Violence: Not on file   Housing Stability: Not on file     Patient Active Problem List   Diagnosis     Aortitis (H)     No Known Allergies      ROS     A 10 point ROS was performed with pertinent findings listed above.      Objective     PHYSICAL EXAM  /68   Pulse 63   Temp 97.6  F (36.4  C) (Oral)   Resp 16   Ht 1.727 m (5'  "8\")   Wt 72.3 kg (159 lb 8 oz)   SpO2 99%   BMI 24.25 kg/m    Wt Readings from Last 4 Encounters:   03/12/22 72.3 kg (159 lb 8 oz)   03/01/22 74.7 kg (164 lb 9.6 oz)   06/13/21 66.7 kg (147 lb)   02/18/21 68 kg (150 lb)     Constitutional: WD-WN-WG cooperative  Eyes: nl EOM, normal sclera, no conjunctival redness   ENT: nl external ears, nose, hearing, lips, teeth, gums, throat  No mucous membrane lesions, normal saliva pool  Resp: lungs clear to auscultation  CV: RRR  GI: no ABD tenderness, no guarding  MSK:  On inspection, No visible redness warmth or swelling in any joint   Hands: dorsal and palmar surfaces normal, able to spread fingers and make fist, no synovitis or tenderness in MCP, PIP or DIP joints, no swan neck or boutonnieres deforities  Wrist and Elbows: flexion and extension WNL, non tender   Shoulders: abduction preserved to 180 degrees, normal internal and external rotation, nontender AC joint, SC joint, bicipital tendon insertion.  Hips: normal flexion and internal and external rotation of hip with knees flexed.   Knees: flexion and extension of knee normal  Ankles: non tender, no swelling, normal ROM  Feet: non tender subtalar and talar joint, non tender MTP, PIP and DIP joints  Strength/Sensory: normal strength 5/5 in proximal and distal muscle groups, normal sensation in all 4 extremities  Skin: no nail pitting, alopecia, rash, nodules or lesions, no nailfold hypertrophy or ragged edges  Psych: nl judgement, orientation, memory, affect.      DATA:  Outside Records: YES  Outside Xrays: YES  CBC:  Recent Labs   Lab Test 03/12/22  0103 03/06/21  1716   WBC 5.5  --    RBC 5.24  --    HGB 15.5 15.7   HCT 45.7  --    MCV 87  --    MCH 29.6  --    MCHC 33.9  --    RDW 12.4  --      --        BMP:  Recent Labs   Lab Test 03/12/22  0108 03/12/22  0103 03/06/21  1716   NA  --  138 139   POTASSIUM  --  3.4 3.6   CHLORIDE  --  103 104   CO2  --  32 31   ANIONGAP  --  3 4   GLC  --  88 125*   BUN  -- "  10 17   CR 1.0 0.83 0.87   GFRESTIMATED >60 >90 >90   AMANDA  --  9.2 9.1       LFT:  Recent Labs   Lab Test 03/12/22  0103   PROTTOTAL 8.6   ALBUMIN 4.1   BILITOTAL 0.5   ALKPHOS 100   AST 18   ALT 19       No results found for: CKTOTAL  No results found for: TSH  No results found for: URIC    Inflammatory markers  Lab Results   Component Value Date    CRP 6.3 03/12/2022     Lab Results   Component Value Date    SED 8 03/12/2022     No results found for: BELLA    UA RESULTS:  Recent Labs   Lab Test 03/12/22  0055 11/26/20  0847   COLOR Yellow Yellow   APPEARANCE Clear Clear   URINEGLC Negative Negative   URINEBILI Negative Negative   URINEKETONE Trace* Negative   SG 1.027 1.019   UBLD Negative Negative   URINEPH 6.0 6.5   PROTEIN 20 * Negative   NITRITE Negative Negative   LEUKEST Negative Negative   RBCU 1  --    WBCU 2  --          Autoimmunity labs:     No results found for: RHF  No results found for: CCPIGG  No results found for: ANCA  No results found for: F9WYOPU  No results found for: P7TBSNS  No results found for: JORGE  No results found for: DNA  No results found for: RNPIGG, SMIGG, SSAIGG, SSBIGG, SCLIGG    IMAGING:  reviewed

## 2022-03-12 NOTE — PROGRESS NOTES
Brief Rheumatology Note:    We were called regarding new imaging study found in this young gentleman that comes to the ED with abdominal pain, with imaging findings showing aortitis.  Currently patient is hemodynamically stable, seen by vascular surgery also found to be vascularly intact.   Could be incidental finding vs infections (viral vs  vs others) vs other underlying disease.   Other causes including large vessel vasculitis (takayasu vs GCA), ANCA vasculitis, IGG4 disease, Connective tissue diseases, Behcets disease,     Labs pending: ESR and CRP  We will recommend to add on ALEXANDER, ANCA, hep B/C, RPR, quant immunoglobulins with IgG subclasses.    Given patient is stable, afebrile, normal BP and pulse, normal labs incluing kidney function, electrolytes, UA, normal ESR levels, and no history of any disease and not on any medications, and after toradol he is pain free, we would not recommend any acute intervention right now, and will formally see him in the morning to see if there is any other association that could be the culprit to the above mentioned findings.    We will formally see patient in the morning.     Jessica Simmons MD  Rheumatology Fellow     Case discussed with Dr. Taveras, attending physician.     I discussed this patient with the Rheumatology Fellow. I agree with the findings and recommendations.    Omid Taveras M.D.  Staff Rheumatologist, [facility]  Pager [phone]

## 2022-03-12 NOTE — H&P
"  Resident/Fellow Attestation   I, Federica Dsouza, was present with the medical/[employer] student who participated in the service and in the documentation of the note.  I have verified the history and personally performed the physical exam and medical decision making.  I agree with the assessment and plan of care as documented in the note.      26 year old male with not much significant pmhx presenting with emesis. Found to have arteritis on CT A/P. Patient denies current fevers, N/V, abdominal pain, body/joint pain, dry eyes/mouth, rashes. VSS. CMP, CBC, inflammatory markers normal. Benign physical exam, appears in generally good health, alert, heat RRR, abdomen non-tender, no swollen joints, no rashes. DDX includes rheumatologic vs less likely infection.     Arteritis  -BCX x2  -EKG  -Ondansetron  -MRI abdomen w/ contrast  -Rheum consult  -f/u on rheumatology recommended labs    Federica Dsouza DO  PGY2  Date of Service (when I saw the patient): 03/12/22    [facility]    History and Physical - Medicine Service, MAROON TEAM 2       Date of Admission:  3/12/2022    Assessment & Plan      Stephon Wolfe is a 26 year old male w/ PMH of prediabetes, tension headaches, back pain, hip and knee pain, and bipolar disorder admitted from the ED on 3/12/2022 for emesis and abdominal pain. 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    # Nausea, emesis, resolved  # Abdominal pain, resolved  He has no prior history of GI disturbance or similar GI symptoms. He attributes it to possible poisoning after eating \"cow meat,\" vomited 3x two hours after ingestion. Symptoms have completely resolved, abdomen non-tender on exam. Last bowel movement 3/11/22 around 11 PM, no diarrhea noted.  - Miralax and Senna-docusate PRN  - Zofran PRN  - Regular diet  - Continue to monitor symptoms    # Aortitis  Differential ddx is broad and " includes large vessel vasculopathies (Takayusa v. GCA), medium vessel vasculopathies, Behcet syndrome, Jayro syndrome, IgG4-related disease, spondyloarthritis, sarcoidosis, fibromuscular dysplasia, polychondritis, Salmonella infection, and syphilis. 3/12/22 labs: non-elevated CRP = 6.3 and ESR = 8, CMP and CBC wnl. Treponema Ab negative. UA showed albuminuria. Rheum consulted, recommended additional workup including ALEXANDER, ANCA, Hep B/C, IgG panel, all pending. They discussed with radiology who recommended MRI AP with contrast to distinguish b/w chronic v. acute aortitis.   - Appreciate rheum consult  - Ordered MRI w/ contrast for chronic v. acute aortitis, CXR for sarcoidosis  - F/u on labs: IgG/IgM levels, IgG subclasses, ANCA, ALEXANDER, Hep C Ab, Hep B surface Ag, Hep B surface Ab  - Ordered BC x2  - Consider MRA in future to rule out Takayusa    # Hx HA  # Hx back pain, joint pain  Has not had a headache for over a year. Historically relieved by Tylenol.Back pain once last year, now resolved. Hip and knee pain in prior years related to soccer injuries, better with PT.  - Tylenol PRN    # Albuminuria  In setting of non-elevated Cr and no edema, no other contributing medical problems.  - Repeat BMP tomorrow  - Repeat UA if symptoms develop    # Hx Abnormal EKG findings  EKG on 3/6/21 showed abnormal results, f/u Echo wnl and troponin undetectable.  - Repeat EKG     Diet: Regular Diet Adult    DVT Prophylaxis: Low Risk/Ambulatory with no VTE prophylaxis indicated  Gonzales Catheter: Not present  Fluids: none  Central Lines: None  Cardiac Monitoring: None  Code Status: Full Code      Clinically Significant Risk Factors Present on Admission   None                Disposition Plan   Expected Discharge: 1-2 midnights  Anticipated discharge location:  Home   Delays: None noted     The patient's care was discussed with the Attending Physician, Dr. Cristobal, and Patient.    Omid Dennis, MS3  Medical Student  Medicine Service,  RASHMI TEAM 2  [facility]  Securely message with the [employer] Web Console (learn more here)  Text page via [facility] Paging/Directory   Please see signed in provider for up to date coverage information    Clinically Significant Risk Factors Present on Admission   None known.  ______________________________________________________________________    Chief Complaint   Emesis w/ abdominal pain    History is obtained from the patient    History of Present Illness   Stephon Wolfe is a 26 year old male who has a history of prediabetes, tension headaches, back pain, hip and knee pain, and bipolar disorder and is admitted for 3 episodes of non-bloody emesis and abdominal pain two hours following ingestion of beef. This afternoon he denies nausea, emesis, or abdominal pain this morning, and denies having GI symptoms otherwise in the past. He had back pain last year that improved with PT and he denies any symptoms currently. He also endorsed previous hip and knee pain that he attributes to soccer injuries in years prior, also improved with PT and not current. He denies worsened symptoms in the morning or evening with these joint pains. No recent skin lesions or injuries. He also has a history of tension headaches improved with acetaminophen, the last one a year ago. He was diagnosed with bipolar disorder as a teenager but has not required medication recently. He denies vision or auditory changes, difficulty breathing or cough, fever/chills, muscle weakness, rashes, dry eyes, dry skin, dysuria, genital sores. Denies any sick contacts.    Review of Systems    The 10 point Review of Systems is negative other than noted in the HPI or here.     Past Medical History    Prediabetes (March 2016), Insomnia w/ headache (seen in ED for this 3/1/22), back pain (Dec 2020), R hip pain (March 2020), bilateral knee pain (last seen for R knee pain in Nov 2021), bipolar I disorder (Feb 2011)    Past  "Surgical History   Past surgical history review with no previous surgeries identified.    Social History   I have personally reviewed the social history with the patient. 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 [place]oved [place] from [place] in 2006, goes back and forth from there occasionally, last went there last year. Has 5 year old son there. Denies alcohol, tobacco, drug use. Has been sexually active w/ \"a few\" sexual partners previously, most recently 1 year ago.     Family History   No known significant family history.    Prior to Admission Medications   Prior to Admission Medications   Prescriptions Last Dose Informant Patient Reported? Taking?   acetaminophen (TYLENOL) 325 MG tablet Unknown at Unknown time  Yes Yes   Sig: Take 325-650 mg by mouth every 6 hours as needed for mild pain   diphenhydrAMINE (BENADRYL) 25 MG tablet Unknown at Unknown time  No Yes   Sig: Take 1 tablet (25 mg) by mouth every 6 hours as needed for itching   ergocalciferol (ERGOCALCIFEROL) 1.25 MG (03175 UT) capsule Unknown at Unknown time  Yes Yes   Sig: daily.   ibuprofen (ADVIL/MOTRIN) 600 MG tablet Unknown at Unknown time  Yes Yes   Sig: Take 600 mg by mouth every 6 hours as needed for moderate pain   traZODone (DESYREL) 100 MG tablet Unknown at Unknown time  No Yes   Sig: Take 1 tablet (100 mg) by mouth At Bedtime      Facility-Administered Medications: None     Allergies   No Known Allergies    Physical Exam   Vital Signs: Temp: 96.9  F (36.1  C) Temp src: Oral BP: 115/70 Pulse: 64   Resp: 16 SpO2: 100 % O2 Device: None (Room air)    Weight: 160 lbs 0 oz    Constitutional: awake, alert, cooperative, no apparent distress, and appears stated age  Eyes: extra-ocular muscles intact and vision intact  Hematologic / Lymphatic: no cervical, inguinal lymphadenopathy  Respiratory: No increased work of breathing, good air exchange, clear to auscultation bilaterally, no crackles or wheezing  Cardiovascular: " Normal apical impulse, regular rate and rhythm, normal S1 and S2, no S3 or S4, and no murmur noted, radial pulses 3+ bilaterally, no aortic bruits heard  GI: No scars, normal bowel sounds, soft, non-distended, non-tender, no masses palpated, no hepatosplenomegally  Skin: no bruising or bleeding, normal skin color, texture, turgor, no redness, warmth, or swelling, no rashes and no lesions  Musculoskeletal: no lower extremity pitting edema present  there is no redness, warmth, or swelling of the joints  Neurologic: Awake, alert, oriented to name, place and time.  Cranial nerves II-XII are grossly intact.    Data   Data reviewed today: I reviewed all medications, new labs and imaging results over the last 24 hours. I personally reviewed the abdominal CT image(s) showing aortitis.    Recent Labs   Lab 03/12/22  0108 03/12/22  0103   WBC  --  5.5   HGB  --  15.5   MCV  --  87   PLT  --  244   NA  --  138   POTASSIUM  --  3.4   CHLORIDE  --  103   CO2  --  32   BUN  --  10   CR 1.0 0.83   ANIONGAP  --  3   AMANDA  --  9.2   GLC  --  88   ALBUMIN  --  4.1   PROTTOTAL  --  8.6   BILITOTAL  --  0.5   ALKPHOS  --  100   ALT  --  19   AST  --  18   LIPASE  --  79     Recent Results (from the past 24 hour(s))   CT Abdomen Pelvis w Contrast    Addendum: 3/12/2022        Another differential for abdominal aortic wall thickening would be wall hematoma and acute aortic syndrome. No dissection.    Asymmetric plaque would not be typical in a patient of this age.          Narrative    EXAM: CT ABDOMEN PELVIS W CONTRAST  LOCATION: [facility]  DATE/TIME: 3/12/2022 1:12 AM    INDICATION: RLQ abdominal pain  COMPARISON: None.  TECHNIQUE: CT scan of the abdomen and pelvis was performed following injection of IV contrast. Multiplanar reformats were obtained. Dose reduction techniques were used.  CONTRAST: 78mL's iso 370    FINDINGS:   LOWER CHEST: Normal.    HEPATOBILIARY: Normal.    PANCREAS:  Normal.    SPLEEN: Normal.    ADRENAL GLANDS: Normal.    KIDNEYS/BLADDER: Normal.    BOWEL: Normal caliber appendix. No bowel obstruction or pneumatosis.    LYMPH NODES: Normal.    VASCULATURE: Significant wall thickening involving the abdominal aorta with adjacent inflammatory stranding, beginning at the level of the origin of the SMA, extending past the renal arteries, and resolving approximately at the level of the MORGAN origin   (approximately 7.5 cm in craniocaudal extent). There is mild thickening involving the origin of both renal arteries. Wall thickening results in between 25 and 50% luminal narrowing in the aorta. No retroperitoneal hemorrhage.    PELVIC ORGANS: No free fluid    MUSCULOSKELETAL: No acute bony abnormality.      Impression    IMPRESSION:   1.  CT findings compatible with aortitis.    Impression was called to Dr. Goltz by Dr. Morro Chapman on 3/12/2022 1:44 AM.

## 2022-03-12 NOTE — ED NOTES
Dr. Jessica Foster (Fellow) with Rheumatology can be reached at   Cell: [phone]  Pager: [phone]    Dr. Taveras (Attending Rheumatologist)  Pager: [phone]

## 2022-03-13 LAB
ANION GAP SERPL CALCULATED.3IONS-SCNC: 6 MMOL/L (ref 3–14)
BUN SERPL-MCNC: 6 MG/DL (ref 7–30)
CALCIUM SERPL-MCNC: 8.8 MG/DL (ref 8.5–10.1)
CHLORIDE BLD-SCNC: 106 MMOL/L (ref 94–109)
CO2 SERPL-SCNC: 26 MMOL/L (ref 20–32)
CREAT SERPL-MCNC: 0.71 MG/DL (ref 0.66–1.25)
GFR SERPL CREATININE-BSD FRML MDRD: >90 ML/MIN/1.73M2
GLUCOSE BLD-MCNC: 114 MG/DL (ref 70–99)
HOLD SPECIMEN: NORMAL
POTASSIUM BLD-SCNC: 3.6 MMOL/L (ref 3.4–5.3)
SODIUM SERPL-SCNC: 138 MMOL/L (ref 133–144)
TSH SERPL DL<=0.005 MIU/L-ACNC: 2.02 MU/L (ref 0.4–4)

## 2022-03-13 PROCEDURE — 99233 SBSQ HOSP IP/OBS HIGH 50: CPT | Mod: GC | Performed by: STUDENT IN AN ORGANIZED HEALTH CARE EDUCATION/TRAINING PROGRAM

## 2022-03-13 PROCEDURE — 36415 COLL VENOUS BLD VENIPUNCTURE: CPT | Performed by: STUDENT IN AN ORGANIZED HEALTH CARE EDUCATION/TRAINING PROGRAM

## 2022-03-13 PROCEDURE — 250N000013 HC RX MED GY IP 250 OP 250 PS 637: Performed by: STUDENT IN AN ORGANIZED HEALTH CARE EDUCATION/TRAINING PROGRAM

## 2022-03-13 PROCEDURE — 83036 HEMOGLOBIN GLYCOSYLATED A1C: CPT | Performed by: STUDENT IN AN ORGANIZED HEALTH CARE EDUCATION/TRAINING PROGRAM

## 2022-03-13 PROCEDURE — 86481 TB AG RESPONSE T-CELL SUSP: CPT | Performed by: STUDENT IN AN ORGANIZED HEALTH CARE EDUCATION/TRAINING PROGRAM

## 2022-03-13 PROCEDURE — 82310 ASSAY OF CALCIUM: CPT | Performed by: STUDENT IN AN ORGANIZED HEALTH CARE EDUCATION/TRAINING PROGRAM

## 2022-03-13 PROCEDURE — 84443 ASSAY THYROID STIM HORMONE: CPT | Performed by: STUDENT IN AN ORGANIZED HEALTH CARE EDUCATION/TRAINING PROGRAM

## 2022-03-13 PROCEDURE — 120N000002 HC R&B MED SURG/OB UMMC

## 2022-03-13 RX ORDER — BENZONATATE 100 MG/1
100 CAPSULE ORAL 3 TIMES DAILY PRN
Status: DISCONTINUED | OUTPATIENT
Start: 2022-03-13 | End: 2022-03-17 | Stop reason: HOSPADM

## 2022-03-13 RX ADMIN — ACETAMINOPHEN 975 MG: 325 TABLET ORAL at 11:57

## 2022-03-13 RX ADMIN — BENZONATATE 100 MG: 100 CAPSULE ORAL at 21:37

## 2022-03-13 RX ADMIN — BENZONATATE 100 MG: 100 CAPSULE ORAL at 16:06

## 2022-03-13 ASSESSMENT — ACTIVITIES OF DAILY LIVING (ADL)
ADLS_ACUITY_SCORE: 4

## 2022-03-13 NOTE — PLAN OF CARE
"Goal Outcome Evaluation:    /72 (BP Location: Right arm)   Pulse 74   Temp 98.3  F (36.8  C) (Oral)   Resp 18   Ht 1.783 m (5' 10.2\")   Wt 72.6 kg (160 lb)   SpO2 100%   BMI 22.83 kg/m        1530 - 1930    Neuro: A&Ox4.   Cardiac: SR. VSS.   Respiratory: Sating 100% on RA.  GI/:No bm.  Adequate urine output.  Diet/appetite: Tolerating regular diet. Eating well.  Activity:Independent up to chair and bathroom.  Pain: At acceptable level on current regimen.   Skin: No new deficits noted.  LDA's:None provider okay.  Plan: Continue with POC. Notify primary team with changes.  "

## 2022-03-13 NOTE — PLAN OF CARE
Goal Outcome Evaluation:    Plan of Care Reviewed With: patient     Overall Patient Progress: improving    Outcome Evaluation: VSS, AxOx4, Denies pain, N/V. Rheum consult ordered. Continue POC.

## 2022-03-13 NOTE — PROGRESS NOTES
"[facility]    Progress Note - Medicine Service, [facility] TEAM 2       Date of Admission:  3/12/2022    Assessment & Plan          Stephon Wolfe is a 26 year old male admitted on 3/12/2022. He has a hx of PMH of prediabetes, tension headaches, back pain, hip and knee pain, and bipolar disorder admitted from the ED on 3/12/2022 for emesis and abdominal pain. 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 States that he had more N/V vs abdominal pain. He attributes it to possible poisoning after eating \"cow meat,\" vomited 3x two hours after ingestion. Symptoms have completely resolved, abdomen non-tender on exam. Last bowel movement 3/11/22 around 11 PM, no diarrhea noted. Emesis may be related to aortitis (discussed below) given that there is possible extension into SMA, although would expect pain more than N/V.  - Miralax and Senna-docusate PRN  - Zofran PRN  - Regular diet  - Continue to monitor symptoms     # Aortitis  Differential ddx is broad and includes large vessel vasculopathies (Takayusa v. GCA), medium vessel vasculopathies, Behcet syndrome, Jayro syndrome, IgG4-related disease, spondyloarthritis, sarcoidosis, fibromuscular dysplasia, polychondritis, Salmonella infection, and syphilis. 3/12/22 labs: non-elevated CRP = 6.3 and ESR = 8, CMP and CBC wnl. Treponema Ab negative. UA showed albuminuria. Has LVH on EKG but no evidence of HTN on vital signs. Rheum consulted, recommended additional " workup including ALEXANDER, ANCA, Hep B/C, IgG panel, all pending. They discussed with radiology who recommended MRI AP with contrast to distinguish b/w chronic v. acute aortitis. CXR negative for lymphadenopathy. MRA abdomen with segmental wall thickening of the abdominal aorta with possible extension into SMA and renal arteries. Questionable early retroperitoneal fibrosis. Radiology suspicious of inflammatory etiology, thought infectious less likely, BCX x2 NGTD. There was no evidence of hematoma or dissection. Patient Hb stable and hemodynamically stable. Curbsided vascular surgery today, no urgent surgical intervention recommended, but was recommending comprehensive work up for aortitis. Takayasu arteritis could be considered in the differential, but pt has no hx of heart disease. Retroperitoneal fibrosis could also be an explanation. Also possible that this is idiopathic aortitis, but is a diagnosis of exclusion.  Patient had expressed some desire to go home, but discussed with patient and his sister on the phone, their preference is for patient to complete as much work up as possible to determine the etiology prior to discharge.  - Appreciate rheum consult  - F/u on labs: IgG/IgM levels, IgG subclasses, ANCA, ALEXANDER, Hep C Ab, Hep B surface Ag, Hep B surface Ab  - Quant gold test  - TSH, wnl  - Could consider ID consult to ensure all infectious etiologies worked up  - Rheumatology following, recommends follow up in outpatient in clinic with repeat CT abdomen with contrast in 2 months     # Hx HA  # Hx back pain, joint pain  Has not had a headache for over a year. Historically relieved by Tylenol.Back pain once last year, now resolved. Hip and knee pain in prior years related to soccer injuries, better with PT. This AM has complaints of bilateral frontal headache and eye pressure, now resolved.  - Tylenol PRN     # Albuminuria  In setting of non-elevated Cr and no edema, no other contributing medical problems. Could  have a rheumatologic etiology.  - Repeat UA if symptoms develop  - Could consider renal US to assess for renal HTN related to aortitis     # Hx Abnormal EKG findings  EKG on 3/6/21 demonstrated findings consistent with LVH which has been seen on previous EKGs, blood pressure has been normal, looking at outpatient records, does not seem patient has been hypertensive to explain LVH. Previous Echo wnl and troponin undetectable.     Diet: Regular Diet Adult    DVT Prophylaxis: Low Risk/Ambulatory with no VTE prophylaxis indicated  Gonzales Catheter: Not present  Fluids: none  Central Lines: None  Cardiac Monitoring: None  Code Status: Full Code      Disposition Plan   Expected Discharge: 03/14/2022     Anticipated discharge location:  Awaiting care coordination huddle   Delays: Awaiting completion of rheumatologic work up        The patient's care was discussed with the Attending Physician, Dr. Slim Cristobal, Bedside Nurse and Patient.    Federica Dsouza, DO  Medicine Service, [facility] TEAM [address] [place] [address]  Securely message with the Vocera Web Console (learn more here)  Text page via [facility] Paging/Directory   Please see signed in provider for up to date coverage information               ______________________________________________________________________    Interval History   NAEO. Patient vitals stable, on RA. Patient denies abdominal pain, N/V, diarrhea, pain in joints or body, rashes, chest pain, SOB. Patient has no physical complaints and is awaiting to hear when he can go home. Patient had expressed some desire to go home, but discussed with patient and his sister on the phone, their preference is for patient to complete as much work up as possible to determine the etiology prior to discharge.    4 point ROS negative except as mentioned above.     Data reviewed today: I reviewed all medications, new labs and imaging results over the last 24 hours. I personally reviewed  the abdominal MRA image(s) showing aortitis of abdominal aorta with extension into SMA and renal arteries, possible early RP fibrosis. No evidence of hematoma or dissection. .    Physical Exam   Vital Signs: Temp: 98.4  F (36.9  C) Temp src: Oral BP: 114/74 Pulse: 68   Resp: 18 SpO2: 100 % O2 Device: None (Room air)    Weight: 160 lbs 0 oz  GENERAL: Patient resting comfortably. In no acute distress.  HENT: Head atraumatic, normocephalic. Eyes without scleral icterus, EOM intact. Neck supple, no cervical lymphadenopathy.  CARDIOVASCULAR: RRR, normal S1S2, no murmur, clicks, or rubs.  RESPIRATORY: Clear to auscultation bilaterally, no wheezes, or rhonchi. On no supplemental oxygen.  ABDOMEN: Soft, non-tender, bowel sounds present, no masses appreciated.  GENITOURINARY: no Gonzales catheter in place.  EXTREMITIES: Warm and well perfused, no edema.  SKIN: Not jaundiced, no rash, no ecchymoses.  NEURO: Awake, alert, and responding to questions appropriately. Moving all extremities spontaneously. No lateralizing symptoms or focal neurologic deficits.  PSYCH: Normal affect and thought process.    Data   Recent Labs   Lab 03/13/22  0737 03/12/22  0108 03/12/22  0103   WBC  --   --  5.5   HGB  --   --  15.5   MCV  --   --  87   PLT  --   --  244     --  138   POTASSIUM 3.6  --  3.4   CHLORIDE 106  --  103   CO2 26  --  32   BUN 6*  --  10   CR 0.71 1.0 0.83   ANIONGAP 6  --  3   AMANDA 8.8  --  9.2   *  --  88   ALBUMIN  --   --  4.1   PROTTOTAL  --   --  8.6   BILITOTAL  --   --  0.5   ALKPHOS  --   --  100   ALT  --   --  19   AST  --   --  18   LIPASE  --   --  79     Recent Results (from the past 24 hour(s))   XR Chest 2 Views    Narrative    EXAM: XR CHEST 2 VW 3/12/2022 2:06 PM    HISTORY: assess for lymphadenopathy evidence of sarcoid.    COMPARISON: 3/12/2022 CT.    TECHNIQUE: Upright frontal and lateral views of the chest.    FINDINGS: Midline trachea. Cardiomediastinal silhouette, pulmonary  vasculature are  within normal limits. No focal airspace opacities. No  pneumothorax or pleural effusion.  Gas-distended transverse colon and stomach. No dilated loops of small  bowel seen in the included upper abdomen. No acute osseous  abnormalities.      Impression    IMPRESSION: No radiographic evidence for hilar or mediastinal  lymphadenopathy. No acute airspace or interstitial pulmonary opacity.    I have personally reviewed the examination and initial interpretation  and I agree with the findings.    PETE HERRERA MD         SYSTEM ID:  [id number]   MR Abdomen w/o & w Contrast Angiogram    Narrative    MRA of the abdomen dated 3/12/2022 6:10 PM    Comparison: CT 3/12/2022    Clinical information: Follow-up possible aortitis seen on abdominal CT    Technique: Truefisp localizing images were obtained in axial and  coronal planes. Multiple rapid FLASH acquisitions were obtained after  contrast media injection in both arterial and venous phases. Source  images were reviewed as well as 3D and multi-planar reconstructions.    Findings:    Vascular:  Non-aneurysmal abdominal aorta with circumferential wall thickening at  the level of the renal arteries and in the superior aspect of the  infrarenal portion measuring up to 5 mm in thickness. There is  associated mild smooth narrowing of the aortic lumen with minimum  diameter of 9 mm at the level of L2-3. Thickened portions of the  aortic wall demonstrate diffuse enhancement. There may be minimal  extension into the superior mesenteric and renal artery origins. No  mass or appreciable edema in the periaortic soft tissues.    The celiac axis SMA, and MORGAN are patent. Single right and single left  renal arteries are patent. The right and circumaortic left renal veins  are patent. The portal vein, SMV, splenic vein, hepatic veins, iliac  veins and IVC are patent.    Remaining exam:   Visualized portions of the liver, gallbladder, pancreas, spleen,  adrenal glands, and kidneys are within  normal limits. No abnormally  dilated loops of bowel, free air, free fluid, or pathologically  enlarged lymph nodes in the visualized abdomen.    No overt airspace disease in the visualized lung bases. No overt  osseous abnormality. There appear to be 6 lumbar-type vertebral bodies  with lumbarization of S1 and well-formed rudimentary S1-2 disc.      Impression    Impression:   Segmental wall thickening of the abdominal aorta causing mild luminal  narrowing without associated periaortic abnormality. Findings are  suspicious for inflammatory aortitis or less likely early  retroperitoneal fibrosis. Infectious etiologies are unlikely given the  lack of adjacent inflammation or abnormal collection. Dissection and  intramural hematoma are unlikely given segmental/circumferential  morphology, lack of T1 signal and low density on CT. Malignancy such  as lymphoma is unlikely given intramural nature and narrowing of the  lumen which is highly atypical.         Medications

## 2022-03-13 NOTE — PLAN OF CARE
Goal Outcome Evaluation:    Plan of Care Reviewed With: patient     Overall Patient Progress: improving    Outcome Evaluation: Denies nausea. Had chest xray and MRI done today.

## 2022-03-13 NOTE — PLAN OF CARE
Goal Outcome Evaluation:    Plan of Care Reviewed With: patient     Overall Patient Progress: improving    Outcome Evaluation: Awaiting rhuematology work up      A&Ox4. VSS on RA. Up independently. On regular diet, tolerating well. Complained of a headache, given PRN tylenol. PIV removed, ok-ed by MDs.

## 2022-03-13 NOTE — UTILIZATION REVIEW
Admission Status; Secondary Review Determination       Under the authority of the Utilization Management Committee, the utilization review process indicated a secondary review on the above patient. The review outcome is based on review of the medical records, discussions with staff, and applying clinical experience noted on the date of the review.     (x) Inpatient Status Appropriate - This patient's medical care is consistent with medical management for inpatient care and reasonable inpatient medical practice.     RATIONALE FOR DETERMINATION   26 year old male w/ PMH of prediabetes, tension headaches, back pain, hip and knee pain, and bipolar disorder admitted from the ED on 3/12/2022 for emesis and abdominal pain. 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  Patient requires inpatient admission versus short stay observation or outpatient treatment for the following reasons: This is a serious diagnosis in a patient with our tightness that can be life-threatening and requires a comprehensive work-up differential diagnosis very broad and patient is symptomatic with nausea vomiting and abdominal pain.  Requiring extensive work-up and initiation of treatment depending on the work-up results.    The expected length of stay at the time of admission was more than 2 nights because of the severity of illness, intensity of service provided, and risk for adverse outcome. Inpatient admission is appropriate.         This document was produced using voice recognition software       The information on this document is developed by the utilization review team in order for the business office to ensure compliance. This only denotes the appropriateness of proper admission status and does not reflect the quality of care rendered.   The definitions of Inpatient Status and Observation Status used in making the determination above are those provided in the CMS Coverage  Manual, Chapter 1 and Chapter 6, section 70.4.   Sincerely,   NANCY FORD MD   System Medical Director   Utilization Management   [facility].

## 2022-03-14 ENCOUNTER — APPOINTMENT (OUTPATIENT)
Dept: CARDIOLOGY | Facility: CLINIC | Age: 27
End: 2022-03-14
Payer: COMMERCIAL

## 2022-03-14 LAB
ANA SER QL IF: NEGATIVE
ANCA AB PATTERN SER IF-IMP: NORMAL
ATRIAL RATE - MUSE: 59 BPM
C-ANCA TITR SER IF: NORMAL {TITER}
CREAT UR-MCNC: 199 MG/DL
CRYPTOC AG SPEC QL: NEGATIVE
DIASTOLIC BLOOD PRESSURE - MUSE: NORMAL MMHG
HBA1C MFR BLD: 5.6 % (ref 0–5.6)
HBV SURFACE AB SERPL IA-ACNC: 521.79 M[IU]/ML
HBV SURFACE AG SERPL QL IA: NONREACTIVE
HCV AB SERPL QL IA: NONREACTIVE
HIV 1+2 AB+HIV1 P24 AG SERPL QL IA: NONREACTIVE
IGA SERPL-MCNC: 45 MG/DL (ref 84–499)
IGG SERPL-MCNC: 1297 MG/DL (ref 610–1616)
IGG SERPL-MCNC: 1297 MG/DL (ref 610–1616)
IGM SERPL-MCNC: 77 MG/DL (ref 35–242)
INTERPRETATION ECG - MUSE: NORMAL
LVEF ECHO: NORMAL
P AXIS - MUSE: 61 DEGREES
PATH INTERP SPEC-IMP: NORMAL
PR INTERVAL - MUSE: 132 MS
PROT UR-MCNC: 0.13 G/L
PROT/CREAT 24H UR: 0.07 G/G CR (ref 0–0.2)
QRS DURATION - MUSE: 102 MS
QT - MUSE: 394 MS
QTC - MUSE: 390 MS
QUANTIFERON MITOGEN: 10 IU/ML
QUANTIFERON NIL TUBE: 1.34 IU/ML
QUANTIFERON TB1 TUBE: 10 IU/ML
QUANTIFERON TB2 TUBE: 10
R AXIS - MUSE: 35 DEGREES
SYSTOLIC BLOOD PRESSURE - MUSE: NORMAL MMHG
T AXIS - MUSE: 37 DEGREES
TROPONIN I SERPL HS-MCNC: 6 NG/L
VENTRICULAR RATE- MUSE: 59 BPM

## 2022-03-14 PROCEDURE — 93306 TTE W/DOPPLER COMPLETE: CPT

## 2022-03-14 PROCEDURE — 86638 Q FEVER ANTIBODY: CPT

## 2022-03-14 PROCEDURE — 86060 ANTISTREPTOLYSIN O TITER: CPT

## 2022-03-14 PROCEDURE — 87040 BLOOD CULTURE FOR BACTERIA: CPT

## 2022-03-14 PROCEDURE — 84484 ASSAY OF TROPONIN QUANT: CPT | Performed by: STUDENT IN AN ORGANIZED HEALTH CARE EDUCATION/TRAINING PROGRAM

## 2022-03-14 PROCEDURE — 87449 NOS EACH ORGANISM AG IA: CPT

## 2022-03-14 PROCEDURE — 86738 MYCOPLASMA ANTIBODY: CPT

## 2022-03-14 PROCEDURE — 93306 TTE W/DOPPLER COMPLETE: CPT | Mod: 26 | Performed by: INTERNAL MEDICINE

## 2022-03-14 PROCEDURE — 84156 ASSAY OF PROTEIN URINE: CPT | Performed by: STUDENT IN AN ORGANIZED HEALTH CARE EDUCATION/TRAINING PROGRAM

## 2022-03-14 PROCEDURE — 99223 1ST HOSP IP/OBS HIGH 75: CPT | Mod: GC | Performed by: INTERNAL MEDICINE

## 2022-03-14 PROCEDURE — 87389 HIV-1 AG W/HIV-1&-2 AB AG IA: CPT | Performed by: STUDENT IN AN ORGANIZED HEALTH CARE EDUCATION/TRAINING PROGRAM

## 2022-03-14 PROCEDURE — 120N000002 HC R&B MED SURG/OB UMMC

## 2022-03-14 PROCEDURE — 93005 ELECTROCARDIOGRAM TRACING: CPT

## 2022-03-14 PROCEDURE — 36415 COLL VENOUS BLD VENIPUNCTURE: CPT

## 2022-03-14 PROCEDURE — 87899 AGENT NOS ASSAY W/OPTIC: CPT

## 2022-03-14 PROCEDURE — 99233 SBSQ HOSP IP/OBS HIGH 50: CPT | Mod: GC | Performed by: STUDENT IN AN ORGANIZED HEALTH CARE EDUCATION/TRAINING PROGRAM

## 2022-03-14 PROCEDURE — 93010 ELECTROCARDIOGRAM REPORT: CPT | Performed by: INTERNAL MEDICINE

## 2022-03-14 PROCEDURE — 36415 COLL VENOUS BLD VENIPUNCTURE: CPT | Performed by: STUDENT IN AN ORGANIZED HEALTH CARE EDUCATION/TRAINING PROGRAM

## 2022-03-14 PROCEDURE — 999N000208 ECHOCARDIOGRAM COMPLETE

## 2022-03-14 PROCEDURE — 999N000248 HC STATISTIC IV INSERT WITH US BY RN

## 2022-03-14 ASSESSMENT — ACTIVITIES OF DAILY LIVING (ADL)
ADLS_ACUITY_SCORE: 4

## 2022-03-14 NOTE — PROGRESS NOTES
SPIRITUAL HEALTH SERVICES  SPIRITUAL ASSESSMENT Progress Note  [facility] UU U5A     REFERRAL SOURCE: Self initiated visit    I tele- visited with the pt. Pt mentioned he is not in the best of his health, but hopeful. Pt mentioned that he is currently living with his siblings. Pt mentioned he could use a prayer. I prayed for him may allah chaka him complete recovery.    PLAN: [facility] will always be available for support and follow up for his duration of stay.    Priyanka Manzano  Chaplain Resident  Phone: [phone]

## 2022-03-14 NOTE — PLAN OF CARE
"Goal Outcome Evaluation:    Plan of Care Reviewed With: patient     Overall Patient Progress: improving    Outcome Evaluation: C/O L-sided chest pain that feels like \"pressure\".  EKG and troponin completed.  After episode of chest pain, pt denied pain.    [id number]  Pt A&Ox4, ind in room.  VSS on RA. Sputum sample ordered needs to be collected.  Echo complete.  PIV inserted in L arm.  Renal US 3/15, NPO at 0000 prior.    "

## 2022-03-14 NOTE — PLAN OF CARE
Goal Outcome Evaluation:    Plan of Care Reviewed With: patient     Overall Patient Progress: improving    VSS. No acute changes.  Plan to discharge home today.

## 2022-03-14 NOTE — CONSULTS
GENERAL ID SERVICE CONSULTATION     Patient:  Stephon Wolfe   Date of birth 1995, Medical record number [id number]  Date of Visit:  03/14/2022  Date of Admission: 3/12/2022  Consult Requester:Slim Cristobal MD          Assessment and Recommendations:   ASSESSMENT:  1. 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 Nausea, vomiting, resolved  3. Abdominal pain, resolved  4. Albuminuria  5. Cough w/ green sputum production  6. Hx Prediabetes  7. Hx headache, back pain/joint pain, resolved  8. H/o Bipolar disorder    DISCUSSION:   Stephon Wolfe is a 27 y/o male with PMHx prediabetes, tension HA, back/hip/knee pain, and bipolar d/o who presented to the ER on 3/12/2022 with emesis and abdominal pain x1 day, initial workup significant for abdominal aortitis on CT A/P extending from the SMA to the MORGAN. CXR negative. MRA Abdomen obtained shows segmental wall thickening of the abdominal aorta with possible extension into SMA and renal arteries, questionable early retroperitoneal fibrosis, but no significant periadventitial inflammation.    Agree with broad differential for aortitis explored by the primary team including infectious etiologies, vasculopathies and other autoimmune-related conditions. Given the lack of aneurysm on imaging and negative blood cultures, infectious etiology appears less likely however do agree with pursuing broad infectious workup. In terms of infectious etiologies, most commonly involved pathogens are Salmonella, Staph aureus, Strep pneumonia, Mycobacterium tuberculosis, HIV, and Treponema pallidum. Treponema was negative on admission. The patient did report occasional diarrhea prior to admission, possibly concerning for Salmonella infection although diarrhea is now resolved and blood cultures remain negative to date. Given Hx born in [facility] and frequent travel to [facility] and [facility], agree with TB rule out in this patient. Other rare etiologies to consider: Coxiella, fungal  infection including cryptococcus. Recommend further workup as noted below.    Infectious Workup Completed:  - Treponema ab (-) 3/12  - UA 3/12 negative  - Blood cultures x2 3/12/2022 w/ no growth after 1 day  - HIV (-)    Infectious Workup Pending:  - Quant TB  - Respiratory aerobic culture  - Hepatitis serologies    RECOMMENDATION:  1. To complete infectious workup, obtain the following:   - 2 additional sets of daily blood cultures (ordered for you)   - ASO Strep titer (ordered for you)   - Mycoplasma IgG and IgM antibodies (ordered for you)   - Coxiella antibody IGG (ordered for you)   - Fungitell (ordered for you)   - Cryptococcus antigen (ordered for you)   - Enteric panel if able to obtain    2. Agree with further autoimmune/inflammatory workup per rheumatology and primary team    Thank you for this consult. ID will continue to follow.     Patient was discussed with Attending physician Dr. Vidal.     Pili Sanford  PGY-1 [facility] Internal Medicine  [phone]  ________________________________________________________________    Consult Question:.  Admission Diagnosis: Aortitis (H) [I77.6]         History of Present Illness:   Stephon Wolfe is a 25 y/o male with PMHx prediabetes, tension HA, back/hip/knee pain, and bipolar d/o who presented on 3/12/2022 with emesis and abdominal pain x1 day. The patient reported eating a meal containing cooked beef the evening prior to admission, ~2100, with subsequent N/V and abdominal pain 2 hours after. He states that all the food was cooked, does not believe it was raw. He reports diffuse abdominal pain, slightly worse on the left. He denies fever/chills. Denies urinary changes. The patient presented to the ER and states that he subsequently began to feel much better. The patient states that prior to that evening, he was feeling in his usual state of health. Reports occasional diarrhea, nonbloody. He denies any cough, night sweats, weight loss, known ill contacts. The  patient denied concern for sexually transmitted infections. Of note, the patient was born in [facility] [place], moved [place] the [place] in [place]. Reports that he still travels to [facility] and [facility] frequently to visit family. No recent rash, fevers, or chills.    Pt reports that his PCP is at [facility].    ED/Workup since admission:  VSS upon ED arrival. Initial CT AP obtained showed finding of abdominal aortitis extending from the SMA to the MORGAN. Pt was admitted to medicine for further workup.         Review of Systems:   CONSTITUTIONAL:  No fevers or chills  EYES: negative for icterus  ENT:  negative for hearing loss, tinnitus and sore throat  RESPIRATORY:  negative for cough with sputum and dyspnea  CARDIOVASCULAR:  negative for chest pain, dyspnea  GASTROINTESTINAL:  negative for nausea, vomiting, diarrhea and constipation  GENITOURINARY:  negative for dysuria  HEME:  No easy bruising  INTEGUMENT:  negative for rash and pruritus  NEURO:  Negative for headache         Past Medical History:   History reviewed. No pertinent past medical history.         Past Surgical History:   History reviewed. No pertinent surgical history.         Family History:   Reviewed and non-contributory.   History reviewed. No pertinent family history.         Social History:     Social History     Tobacco Use     Smoking status: Never Smoker     Smokeless tobacco: Never Used   Substance Use Topics     Alcohol use: No     Pt reports Hx sexual activity with females, not recently. Denies concern for STI's at this time.  Pt works night shifts.  Nonsmoker.  Has family in [place] [facility] [place] and [facility] that he visits periodically. Born in [place] [facility] [facility] [place], moved to  in [place].         Current Medications:   Reviewed in chart          Allergies:   No Known Allergies         Physical Exam:   Vitals were reviewed  Patient Vitals for the past 24 hrs:   BP Temp Temp src Pulse Resp SpO2   03/14/22 1315 109/74 97.6  F (36.4  C) Axillary 61 20 100 %   03/14/22 0852 117/73 -- -- -- --  --   03/14/22 0851 105/66 -- -- -- -- --   03/14/22 0818 106/67 -- -- 62 16 100 %   03/14/22 0553 96/58 97.3  F (36.3  C) Oral 61 16 100 %   03/13/22 2100 103/62 97.6  F (36.4  C) Oral 61 18 100 %   03/13/22 1732 112/69 -- -- 57 -- --   03/13/22 1730 111/69 -- -- 57 -- --       Physical Examination:  GENERAL:  well-developed, well-nourished, in bed in no acute distress.   HEENT:  Head is normocephalic, atraumatic   EYES:  Eyes have anicteric sclerae without conjunctival injection or stigmata of endocarditis.    ENT:  Oropharynx is moist without exudates or ulcers. Tongue is midline  NECK:  Supple. No cervical lymphadenopathy  LUNGS:  Clear to auscultation bilateral. No increased WOB, no respiratory distress.  CARDIOVASCULAR:  Regular rate and rhythm with no murmurs, gallops or rubs.  ABDOMEN:  Normal bowel sounds, soft, nontender. No appreciable hepatosplenomegaly, no midline mass or tenderness, no bruit heard.  SKIN:  No acute rashes.  Line(s) are in place without any surrounding erythema or exudate. No stigmata of endocarditis.  NEUROLOGIC:  Grossly nonfocal. Active x4 extremities         Laboratory Data:     Inflammatory Markers    Recent Labs   Lab Test 03/12/22  0105 03/12/22 0103   SED  --  8   CRP 6.3  --        Hematology Studies    Recent Labs   Lab Test 03/12/22 0103 03/06/21  1716   WBC 5.5  --    HGB 15.5 15.7   MCV 87  --      --        Metabolic Studies     Recent Labs   Lab Test 03/13/22  0737 03/12/22  0108 03/12/22 0103 03/06/21  1716     --  138 139   POTASSIUM 3.6  --  3.4 3.6   CHLORIDE 106  --  103 104   CO2 26  --  32 31   BUN 6*  --  10 17   CR 0.71 1.0 0.83 0.87   GFRESTIMATED >90 >60 >90 >90       Hepatic Studies    Recent Labs   Lab Test 03/12/22 0103   BILITOTAL 0.5   ALKPHOS 100   ALBUMIN 4.1   AST 18   ALT 19       Microbiology:  Culture Micro   Date Value Ref Range Status   09/28/2016 No Beta Streptococcus isolated  Final       Urine Studies    Recent Labs   Lab Test  03/12/22  0055 11/26/20  0847   LEUKEST Negative Negative   WBCU 2  --        Vancomycin Levels  No lab results found.    Invalid input(s): VANCO    Hepatitis B Testing No lab results found.  Hepatitis C Testing   No results found for: HCVAB, HQTG, HCGENO, HCPCR, HQTRNA, HEPRNA  Respiratory Virus Testing    No results found for: RS, FLUAG     CT A/P 3/12/2022:                                        Impression:   Segmental wall thickening of the juxtarenal abdominal aorta causing  mild luminal narrowing without associated periaortic abnormality.  There is enhancement of the wall without significant periadventitial  inflammation. Consider vasculitis and other non-infectious aortitis.  Consider evaluation of large vessels in the chest to help narrow  differential, if aortic arch, cerebral vessels or pulmonary arteries  may be involved.     MRA Abdomen wo and w/ contrast 3/12/2022:  Impression:   Segmental wall thickening of the juxtarenal abdominal aorta causing  mild luminal narrowing without associated periaortic abnormality.  There is enhancement of the wall without significant periadventitial  inflammation. Consider vasculitis and other non-infectious aortitis.  Consider evaluation of large vessels in the chest to help narrow  differential, if aortic arch, cerebral vessels or pulmonary arteries  may be involved.     CXR 3/12/2022:  IMPRESSION: No radiographic evidence for hilar or mediastinal  lymphadenopathy. No acute airspace or interstitial pulmonary opacity.

## 2022-03-14 NOTE — PROGRESS NOTES
"[facility]    Progress Note - Medicine Service, MAROON TEAM 2       Date of Admission:  3/12/2022    Assessment & Plan          Stephon Wolfe is a 26 year old male admitted on 3/12/2022. He has a hx of PMH of prediabetes, tension headaches, back pain, hip and knee pain, and bipolar disorder admitted from the ED on 3/12/2022 for emesis and abdominal pain. 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    Changes for today:  - Ordered urine protein/Cr ratio in afternoon and in AM   - Renal + renal artery US tomorrow AM  - NPO at [facility] for renal US  - Ordered sputum culture  - Cardio Curbside: Did not recommend a consult. Reported EKG abnormalities due to early repolarizations. Findings not new, and not concerning right now. Deferring to rheumatology for aortitis.     - ID consult today  - GUANAKO today, normal aorta  - f/u with rheumatology in 2 months  - Will need outpatient CT AB w/contrast ordered for 2 months prior to Rheum f/u    # Nausea, emesis, resolved  # Abdominal pain, resolved  He has no prior history of GI disturbance or similar GI symptoms. States that he had more N/V vs abdominal pain. He attributes it to possible poisoning after eating \"cow meat,\" vomited 3x two hours after ingestion. Symptoms have completely resolved, abdomen non-tender on exam. Last bowel movement 3/11/22 around 11 PM, no diarrhea noted. Emesis may be related to aortitis (discussed below) given that there is possible extension into SMA, although would expect pain more than N/V.  - Miralax and Senna-docusate PRN  - Zofran PRN  - Regular diet  - Continue to monitor symptoms     # Aortitis  Differential ddx is broad and includes large vessel vasculopathies (Takayusa v. GCA), medium vessel vasculopathies, Behcet syndrome, Jayro syndrome, IgG4-related disease, spondyloarthritis, sarcoidosis, fibromuscular dysplasia, " polychondritis, Salmonella infection, and syphilis. 3/12/22 labs: non-elevated CRP = 6.3 and ESR = 8, CMP and CBC wnl. Treponema Ab negative. UA showed albuminuria. Has LVH on EKG but no evidence of HTN on vital signs. Rheum consulted, recommended additional workup including ALEXANDER, ANCA, Hep B/C, IgG panel, all pending. They discussed with radiology who recommended MRI AP with contrast to distinguish b/w chronic v. acute aortitis. CXR negative for lymphadenopathy. MRA abdomen with segmental wall thickening of the abdominal aorta with possible extension into SMA and renal arteries. Questionable early retroperitoneal fibrosis. Radiology suspicious of inflammatory etiology, thought infectious less likely, BCX x2 NGTD. There was no evidence of hematoma or dissection. Patient Hb stable and hemodynamically stable. Curbsided vascular surgery today, no urgent surgical intervention recommended, but was recommending comprehensive work up for aortitis. Takayasu arteritis could be considered in the differential, but pt has no hx of heart disease. Retroperitoneal fibrosis could also be an explanation. Also possible that this is idiopathic aortitis, but is a diagnosis of exclusion.   - Appreciate rheum following  - f/u with rheumatology in 2 months  - Will need outpatient CT AB w/contrast ordered for 2 months prior to Rheum f/u  - Appreciate ID consult: recommended enteric panel. ID ordered daily blood cx, ASO strep titer, mycoplasma IgG/IgM antibodies, coxiella antibody, and cryptococcus antigen  - F/u on labs: IgG/IgM levels, IgG subclasses, ANCA, ALEXANDER, Hep C Ab, Hep B surface Ag, Hep B surface Ab, HIV, Quant gold test  - TSH, wnl  - GUANAKO today showed no abnormalities, normal aorta     # Hx of headaches  # Hx back pain, knee pain  Has not had a headache for over a year. Historically relieved by Tylenol.Back pain once last year, now resolved. Hip and knee pain in prior years related to soccer injuries, better with PT. This AM has  complaints of bilateral frontal headache and eye pressure, now resolved.  - Tylenol PRN     # Albuminuria  In setting of non-elevated Cr and no edema, no other contributing medical problems. Could have a rheumatologic etiology.Patient proteinuria could be transient, or due to orthostatic hypotension.Will assess urine protein/CR ratio in the afternoon when patient is mobile, and in the AM. MRA abdomen did indicate possible extension of wall thickening into renal arteries. Possible DDx includes renal HTN related to aortitis.   - Urine protein/Cr ratio in afternoon and in AM to assess orthostatic proteinuria  - Renal US in AM to assess for renal HTN related to aortitis  - NPO at [place]     # Chest pain  # Hx Abnormal EKG findings  EKG on 3/6/21 demonstrated findings consistent with LVH which has been seen on previous EKGs, blood pressure has been normal, looking at outpatient records, does not seem patient has been hypertensive to explain LVH. Previous Echo wnl with no ventricular thickening and troponin normal. Patient with reproducible chest wall tenderness/pressure on left on 3/14, no signs of evidence, EKG looks same as previous, trop normal all VSS. TTE on 3/14 with normal EF and no abnormalities. Chest pain most likely has a MSK origin such as costochondritis.   - Cardio Curbside: Did not recommend a consult. Reported EKG abnormalities due to early repolarizations, which is a normal variant. Findings are not new, and are not concerning right now. Deferring to rheum for aortitis work up.    #Cough  Patient reported cough with green sputum production. Denies any fever/chills, sore throat or wheezing/SOB. Patient is afebrile,and CRP is 6.3.   -Ordered sputum culture    #Partial IgA Deficiency  Patient has an isolated deficiency of serum IgA (45 mg/dL) in the presence of normal IgG and IgM levels. His IgA level is greater than 7 mg/dL, but below the lower limit of normal, indicating he has a partial rather than a severe  deficiency.     Diet: Regular Diet Adult    DVT Prophylaxis: Low Risk/Ambulatory with no VTE prophylaxis indicated  Gonzales Catheter: Not present  Fluids: none  Central Lines: None  Cardiac Monitoring: None  Code Status: Full Code      Disposition Plan   Expected Discharge: 03/14/2022     Anticipated [facility]   Delays: Awaiting completion of rheumatologic work up        The patient's care was discussed with the Attending Physician, Dr. Slim Cristobal, Bedside Nurse and Patient.    [facility]  Medicine Service, [address]  Securely message with the Vocera Web Console (learn more here)  Text page via [facility] Paging/Directory   Please see signed in provider for up to date coverage information               ______________________________________________________________________    Interval History    NAEO. Patient generally feels well. Did complain about chest pain earlier this morning. When asked about his chest pain, patient reported a sharp upper left chest pain. He believes it is due to the position he slept in. The pain is reproducible with movement and palpation. He denies any chest tightness, or pain radiating to his arm or back. He also told nursing that he is coughing up green sputum. He reports no chills/fever, sore throat, wheezing or SOB. He also denies any abdominal pain, N/V, diarrhea, pain in joints or body, and  Rashes. He is awaiting to hear when he can go home.     4 point ROS negative except as mentioned above.     Data reviewed today: I reviewed all medications, new labs and imaging results over the last 24 hours. TTE 3/14 with EF of 60-65%, normal chambers, normal thoracic aorta.    Physical Exam   Vital Signs: Temp: 97.3  F (36.3  C) Temp src: Oral BP: 117/73 Pulse: 62   Resp: 16 SpO2: 100 % O2 Device: None (Room air)    Weight: 160 lbs 0 oz  GENERAL: Patient resting comfortably. In no acute  distress.  HENT: Head atraumatic, normocephalic. Eyes without scleral icterus, EOM intact. Neck supple, no cervical lymphadenopathy.  CARDIOVASCULAR: RRR, normal S1S2, no murmur, clicks, or rubs. No supraclavicular, infraclavicular, abdominal, or femoral bruits. Radial pulses are equal bilaterally.   RESPIRATORY: Clear to auscultation bilaterally, no wheezes, or rhonchi. On no supplemental oxygen.  ABDOMEN: Soft, non-tender, bowel sounds present, no masses appreciated.  GENITOURINARY: no Gonzales catheter in place.  EXTREMITIES: Warm and well perfused, no edema.  SKIN: Not jaundiced, no rash, no ecchymoses.  NEURO: Awake, alert, and responding to questions appropriately. Moving all extremities spontaneously. No lateralizing symptoms or focal neurologic deficits.  PSYCH: Normal affect and thought process.    Data   Recent Labs   Lab 22  0737 22  0108 22  0103   WBC  --   --  5.5   HGB  --   --  15.5   MCV  --   --  87   PLT  --   --  244     --  138   POTASSIUM 3.6  --  3.4   CHLORIDE 106  --  103   CO2 26  --  32   BUN 6*  --  10   CR 0.71 1.0 0.83   ANIONGAP 6  --  3   AMANDA 8.8  --  9.2   *  --  88   ALBUMIN  --   --  4.1   PROTTOTAL  --   --  8.6   BILITOTAL  --   --  0.5   ALKPHOS  --   --  100   ALT  --   --  19   AST  --   --  18   LIPASE  --   --  79     Recent Results (from the past 24 hour(s))   Echo Complete   Result Value    LVEF  60-65%    Narrative    [id number]  [id number]  [id number]  [id number]^ANAHY^TAYLA     [facility],[place]  Echocardiography Laboratory  [address] [place] [id number]     Name: KATHY WAGNER  MRN: [id number]  : 1995  Study Date: 2022 03:40 PM  Age: 26 yrs  Gender: Male  Patient Location: [facility]  Reason For Study: Aortitis, R/O Takayasu  Ordering Physician: TAYLA HIGGINBOTHAM  Performed By: Nicole Mojica     BSA: 1.9 m2  Height: 70 in  Weight: 160  lb  ______________________________________________________________________________  Procedure  Bubble Echocardiogram with two-dimensional, color and spectral Doppler  performed.  ______________________________________________________________________________  Interpretation Summary  Global and regional left ventricular function is normal with an EF of 60-65%.  Right ventricular function, chamber size, wall motion, and thickness are  normal.  The atrial septum is intact as assessed by agitated saline bubble study .  Pulmonary artery systolic pressure cannot be assessed.  The inferior vena cava is normal.  No pericardial effusion is present.  No significant changes noted.  ______________________________________________________________________________  Left Ventricle  Global and regional left ventricular function is normal with an EF of 60-65%.  Left ventricular wall thickness is normal. Left ventricular size is normal.  Left ventricular diastolic function is normal. No regional wall motion  abnormalities are seen.     Right Ventricle  Right ventricular function, chamber size, wall motion, and thickness are  normal.     Atria  Both atria appear normal. The atrial septum is intact as assessed by agitated  saline bubble study .     Mitral Valve  The mitral valve is normal.     Aortic Valve  Aortic valve is normal in structure and function.     Tricuspid Valve  The tricuspid valve is normal. Pulmonary artery systolic pressure cannot be  assessed.     Pulmonic Valve  The pulmonic valve is normal. Trace pulmonic insufficiency is present.     Vessels  The thoracic aorta is normal. The pulmonary artery and bifurcation cannot be  assessed. The inferior vena cava is normal.     Pericardium  No pericardial effusion is present.     Compared to Previous Study  No significant changes noted.  ______________________________________________________________________________  MMode/2D Measurements & Calculations  IVSd: 0.78 cm  LVIDd: 4.7  cm  LVIDs: 2.7 cm  LVPWd: 0.97 cm  FS: 41.3 %  LV mass(C)d: 134.9 grams  LV mass(C)dI: 71.1 grams/m2  Ao root diam: 2.5 cm  asc Aorta Diam: 2.6 cm  LVOT diam: 2.1 cm  LVOT area: 3.5 cm2  LA Volume (BP): 38.0 ml     LA Volume Index (BP): 20.0 ml/m2  RWT: 0.42     Doppler Measurements & Calculations  MV E max okffi: 78.6 cm/sec  MV A max koffi: 57.2 cm/sec  MV E/A: 1.4  MV dec slope: 344.0 cm/sec2  E/E' av.7  Lateral E/e': 5.0  Medial E/e': 8.3     ______________________________________________________________________________  Report approved by: Vazquez Watkins 2022 04:27 PM           Medications

## 2022-03-15 ENCOUNTER — APPOINTMENT (OUTPATIENT)
Dept: ULTRASOUND IMAGING | Facility: CLINIC | Age: 27
End: 2022-03-15
Payer: COMMERCIAL

## 2022-03-15 LAB
CREAT UR-MCNC: 107 MG/DL
D DIMER PPP FEU-MCNC: <0.27 UG/ML FEU (ref 0–0.5)
GAMMA INTERFERON BACKGROUND BLD IA-ACNC: 1.34 IU/ML
M TB IFN-G BLD-IMP: POSITIVE
M TB IFN-G CD4+ BCKGRND COR BLD-ACNC: 8.66 IU/ML
MITOGEN IGNF BCKGRD COR BLD-ACNC: 8.66 IU/ML
MITOGEN IGNF BCKGRD COR BLD-ACNC: 8.66 IU/ML
PROT UR-MCNC: 0.1 G/L
PROT/CREAT 24H UR: 0.09 G/G CR (ref 0–0.2)

## 2022-03-15 PROCEDURE — 36415 COLL VENOUS BLD VENIPUNCTURE: CPT | Performed by: STUDENT IN AN ORGANIZED HEALTH CARE EDUCATION/TRAINING PROGRAM

## 2022-03-15 PROCEDURE — 36415 COLL VENOUS BLD VENIPUNCTURE: CPT

## 2022-03-15 PROCEDURE — 99233 SBSQ HOSP IP/OBS HIGH 50: CPT | Mod: GC | Performed by: INTERNAL MEDICINE

## 2022-03-15 PROCEDURE — 250N000013 HC RX MED GY IP 250 OP 250 PS 637: Performed by: STUDENT IN AN ORGANIZED HEALTH CARE EDUCATION/TRAINING PROGRAM

## 2022-03-15 PROCEDURE — 76770 US EXAM ABDO BACK WALL COMP: CPT

## 2022-03-15 PROCEDURE — 120N000002 HC R&B MED SURG/OB UMMC

## 2022-03-15 PROCEDURE — 87040 BLOOD CULTURE FOR BACTERIA: CPT

## 2022-03-15 PROCEDURE — 84156 ASSAY OF PROTEIN URINE: CPT | Performed by: STUDENT IN AN ORGANIZED HEALTH CARE EDUCATION/TRAINING PROGRAM

## 2022-03-15 PROCEDURE — 93975 VASCULAR STUDY: CPT | Mod: 26 | Performed by: RADIOLOGY

## 2022-03-15 PROCEDURE — 85379 FIBRIN DEGRADATION QUANT: CPT | Performed by: STUDENT IN AN ORGANIZED HEALTH CARE EDUCATION/TRAINING PROGRAM

## 2022-03-15 PROCEDURE — 93975 VASCULAR STUDY: CPT

## 2022-03-15 RX ADMIN — BENZONATATE 100 MG: 100 CAPSULE ORAL at 21:28

## 2022-03-15 RX ADMIN — ACETAMINOPHEN 975 MG: 325 TABLET ORAL at 21:27

## 2022-03-15 ASSESSMENT — ACTIVITIES OF DAILY LIVING (ADL)
ADLS_ACUITY_SCORE: 4

## 2022-03-15 NOTE — PLAN OF CARE
Goal Outcome Evaluation:    Plan of Care Reviewed With: patient     Overall Patient Progress: improving    Outcome Evaluation: Denies pain this shift.    [id number]    Pt A&Ox4 VSS on RA.  Ind in room.  Urine sample sent.  Still needing sputum sample and enteric sample. Rheum consult.

## 2022-03-15 NOTE — PLAN OF CARE
Goal Outcome Evaluation: ongoing, progressing    Reviewed with: patient    Assessment: A&O x 4.  Pleasant and cooperative.  Able to follow instructions and speech is clear.  Denies pain.  Tolerating regular diet.  Pt to be NPO at midnight.  Continent of bowel and bladder- voids spontaneously and without difficulty.  R PIV was removed this shift per pt request.  Team is aware and is ok with pt not having IV access.  Lung sounds clear.  Bowel sounds active x 4.  No skin concerns.

## 2022-03-15 NOTE — PROGRESS NOTES
I, Federica Dsouza, was present with the medical/[employer] student who participated in the service and in the documentation of the note.  I have verified the history and personally performed the physical exam and medical decision making.  I agree with the assessment and plan of care as documented in the note.       26 year old male with not much significant pmhx presenting with emesis. 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 DDX includes Takayasu's vs idiopathic. Rheumatology following, rheumatologic work up negative thus far, though inflammatory markers do not need to necessarily be elevated in Takayasu's. ID following, TB Quantiferon weakly positive, unlikely to be the etiology of his aortitis given he has not had an active TB infection, ID states he will need to be started on latent TB treatment if plans to start prednisone for aortitis. Continue comprehensive work up for condition that left untreated carries a very high mortality rate.        Aortitis  -renal US today, negative for renal HTN  -D-Dimer, serum AFB  -MRI abdomen w/ contrast  -Rheum consult  -f/u on rheumatology recommended labs  -will consider starting steroids and latent TB treatment tomorrow     Federica Dsouza, DO  PGY2  Date of Service (when I saw the patient): 3/15/22      [facility]    Progress Note - Medicine Service, [facility] TEAM 2       Date of Admission:  3/12/2022    Assessment & Plan          Stephon Wolfe is a 26 year old male admitted on 3/12/2022. He has a hx of PMH of prediabetes, tension headaches, back pain, hip and knee pain, and bipolar disorder admitted from the ED on 3/12/2022 for emesis and abdominal pain. 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    Changes for today:  - Renal US today   - D Dimer today <0.37  - ID reported Quant TB gold titier is weakly positive c/w latent tuberculosis (CXR negative).  "Recommend  AFB blood culture   - AFB blood cx ordered   - Rheum suggesting pt f/u after discharge     # Nausea, emesis, resolved  # Abdominal pain, resolved  He has no prior history of GI disturbance or similar GI symptoms. States that he had more N/V vs abdominal pain. He attributes it to possible poisoning after eating \"cow meat,\" vomited 3x two hours after ingestion. Symptoms have completely resolved, abdomen non-tender on exam. Last bowel movement 3/11/22 around 11 PM, no diarrhea noted. Emesis may be related to aortitis (discussed below) given that there is possible extension into SMA, although would expect pain more than N/V.  - Miralax and Senna-docusate PRN  - Zofran PRN  - Regular diet  - Continue to monitor symptoms     # Aortitis  Differential ddx is broad and includes large vessel vasculopathies (Takayusa v. GCA), medium vessel vasculopathies, Behcet syndrome, Jayro syndrome, IgG4-related disease, spondyloarthritis, sarcoidosis, fibromuscular dysplasia, polychondritis, Salmonella infection, and syphilis. 3/12/22 labs: non-elevated CRP = 6.3 and ESR = 8, CMP and CBC wnl. Treponema Ab negative. UA showed albuminuria. Has LVH on EKG but no evidence of HTN on vital signs. Rheum consulted, recommended additional workup including ALEXANDER, ANCA, Hep B/C, IgG panel, all pending. They discussed with radiology who recommended MRI AP with contrast to distinguish b/w chronic v. acute aortitis. CXR negative for lymphadenopathy. MRA abdomen with segmental wall thickening of the abdominal aorta with possible extension into SMA and renal arteries. Questionable early retroperitoneal fibrosis. Radiology suspicious of inflammatory etiology, thought infectious less likely, BCX x2 NGTD. There was no evidence of hematoma or dissection. Patient Hb stable and hemodynamically stable. Curbsided vascular surgery today, no urgent surgical intervention recommended, but was recommending comprehensive work up for aortitis. Takayasu " arteritis could be considered in the differential, but pt has no hx of heart disease. Retroperitoneal fibrosis could also be an explanation. Also possible that this is idiopathic aortitis, but is a diagnosis of exclusion.   - Spoke with Rheum: they do not recommend treating for Takayasu. Do suggest patient f/u with Rheum after discharge.   - Appreciate ID consult: recommended enteric panel. ID ordered daily blood cx, ASO strep titer, mycoplasma IgG/IgM antibodies, coxiella antibody, and cryptococcus antigen  - F/u on labs: IgG/IgM levels, IgG subclasses, ANCA, ALEXANDER, Hep C Ab, Hep B surface Ag, Hep B surface Ab, HIV, Quant gold test  - TSH, wnl  - GUANAKO: no abnormalities, normal aorta  -  ID appreciated recommendations    - latent tuberculosis (CXR negative). Low suspicion that latent  tuberculosis would have caused this aortitis.    - Obtain 1 or 2 AFB blood cultures prior to discharge   - F/u with ID in 3 months       # Hx of headaches  # Hx back pain, knee pain  Has not had a headache for over a year. Historically relieved by Tylenol.Back pain once last year, now resolved. Hip and knee pain in prior years related to soccer injuries, better with PT. This AM has complaints of bilateral frontal headache and eye pressure, now resolved.  - Tylenol PRN     # Albuminuria  In setting of non-elevated Cr and no edema, no other contributing medical problems. Could have a rheumatologic etiology.Patient proteinuria could be transient, or due to orthostatic hypotension.Will assess urine protein/CR ratio in the afternoon when patient is mobile, and in the AM. MRA abdomen did indicate possible extension of wall thickening into renal arteries. Possible DDx includes renal HTN related to aortitis.  Renal US could not visualize the right renal artery origin and mulu segment. Also did not visualize left renal ar origin. No renal artery stenosis demonstrated in the visualized renal artery segments. Additionally, right mid arcuate artery  resistive index elevated. Other arcuate artery resistive indices normal.  - Urine protein/Cr ratio in afternoon and in AM both normal  - Renal US today to assess for renal HTN related to aortitis       # Chest pain  # Hx Abnormal EKG findings  EKG on 3/6/21 demonstrated findings consistent with LVH which has been seen on previous EKGs, blood pressure has been normal, looking at outpatient records, does not seem patient has been hypertensive to explain LVH. Previous Echo wnl with no ventricular thickening and troponin normal. Patient with reproducible chest wall tenderness/pressure on left on 3/14, no signs of evidence, EKG looks same as previous, trop normal all VSS. TTE on 3/14 with normal EF and no abnormalities. Chest pain most likely has a MSK origin such as costochondritis. MRA found aortitis in abdomen,, but patient chest pain could be related to thoracic aortitis. D Dimer today was normal  Suggesting etiology of chest pain is less like due to aortic pathology. Therefore, no indication for a MRA of the thoracic vasculature.   - Cardio Curbside: Did not recommend a consult. Reported EKG abnormalities due to early repolarizations, which is a normal variant. Findings are not new, and are not concerning right now.  - Deferring to rheum for aortitis work up.  - D Dimer today was <0.37    #Cough  Patient reported cough with green sputum production. Denies any fever/chills, sore throat or wheezing/SOB. Patient is afebrile,and CRP is 6.3.   -Ordered sputum culture    #Partial IgA Deficiency  Patient has an isolated deficiency of serum IgA (45 mg/dL) in the presence of normal IgG and IgM levels. His IgA level is greater than 7 mg/dL, but below the lower limit of normal, indicating he has a partial rather than a severe deficiency.     Diet: Regular Diet Adult    DVT Prophylaxis: Low Risk/Ambulatory with no VTE prophylaxis indicated  Gonzales Catheter: Not present  Fluids: none  Central Lines: None  Cardiac Monitoring:  None  Code Status: Full Code      Disposition Plan   Expected Discharge: 03/16/2022     Anticipated discharge location:  Awaiting care coordination huddle   Delays: Awaiting completion of rheumatologic work up        The patient's care was discussed with the Attending Physician, Dr. Johnson, Bedside Nurse and Patient.    James Pham  Medicine Service, [facility] TEAM 2  [facility]  Securely message with the Vocera Web Console (learn more here)  Text page via [facility] Paging/Directory   Please see signed in provider for up to date coverage information               ______________________________________________________________________    Interval History    NAEO. Patient generally feels well. Chest pain has resolved. He reports no chills/fever, sore throat, wheezing or SOB. He also denies any abdominal pain, N/V, diarrhea, pain in joints or body, and  Rashes. He is awaiting to hear when he can go home.     4 point ROS negative except as mentioned above.     Data reviewed today: I reviewed all medications, new labs and imaging results over the last 24 hours. TTE 3/14 with EF of 60-65%, normal chambers, normal thoracic aorta.    Physical Exam   Vital Signs: Temp: 98.4  F (36.9  C) Temp src: Oral BP: 104/68 Pulse: 60   Resp: 18 SpO2: 100 % O2 Device: None (Room air)    Weight: 160 lbs 0 oz  GENERAL: Patient resting comfortably. In no acute distress.  HENT: Head atraumatic, normocephalic. Eyes without scleral icterus, EOM intact. Neck supple, no cervical lymphadenopathy.  CARDIOVASCULAR: RRR, normal S1S2, no murmur, clicks, or rubs. No supraclavicular, infraclavicular, abdominal, or femoral bruits. Radial pulses are equal bilaterally.   RESPIRATORY: Clear to auscultation bilaterally, no wheezes, or rhonchi. On no supplemental oxygen.  ABDOMEN: Soft, non-tender, bowel sounds present, no masses appreciated.  GENITOURINARY: no Gonzales catheter in place.  EXTREMITIES: Warm and well  perfused, no edema.  SKIN: Not jaundiced, no rash, no ecchymoses.  NEURO: Awake, alert, and responding to questions appropriately. Moving all extremities spontaneously. No lateralizing symptoms or focal neurologic deficits.  PSYCH: Normal affect and thought process.    Data   Recent Labs   Lab 22  0737 22  0108 22  0103   WBC  --   --  5.5   HGB  --   --  15.5   MCV  --   --  87   PLT  --   --  244     --  138   POTASSIUM 3.6  --  3.4   CHLORIDE 106  --  103   CO2 26  --  32   BUN 6*  --  10   CR 0.71 1.0 0.83   ANIONGAP 6  --  3   AMANDA 8.8  --  9.2   *  --  88   ALBUMIN  --   --  4.1   PROTTOTAL  --   --  8.6   BILITOTAL  --   --  0.5   ALKPHOS  --   --  100   ALT  --   --  19   AST  --   --  18   LIPASE  --   --  79     Recent Results (from the past 24 hour(s))   Echo Complete   Result Value    LVEF  60-65%    Narrative    [id number]  [id number]  [id number]  [id number]^ANAHY^TAYLA     [facility]  Echocardiography Laboratory  [address] [place], [place] [address]     Name: KATHY WAGNER  MRN: [id number]  : 1995  Study Date: 2022 03:40 PM  Age: 26 yrs  Gender: Male  Patient Location: [facility]  Reason For Study: Aortitis, R/O Takayasu  Ordering Physician: TAYLA HIGGINBOTHAM  Performed By: Nicole Mojica     BSA: 1.9 m2  Height: 70 in  Weight: 160 lb  ______________________________________________________________________________  Procedure  Bubble Echocardiogram with two-dimensional, color and spectral Doppler  performed.  ______________________________________________________________________________  Interpretation Summary  Global and regional left ventricular function is normal with an EF of 60-65%.  Right ventricular function, chamber size, wall motion, and thickness are  normal.  The atrial septum is intact as assessed by agitated saline bubble study .  Pulmonary artery systolic pressure cannot be assessed.  The inferior vena cava is  normal.  No pericardial effusion is present.  No significant changes noted.  ______________________________________________________________________________  Left Ventricle  Global and regional left ventricular function is normal with an EF of 60-65%.  Left ventricular wall thickness is normal. Left ventricular size is normal.  Left ventricular diastolic function is normal. No regional wall motion  abnormalities are seen.     Right Ventricle  Right ventricular function, chamber size, wall motion, and thickness are  normal.     Atria  Both atria appear normal. The atrial septum is intact as assessed by agitated  saline bubble study .     Mitral Valve  The mitral valve is normal.     Aortic Valve  Aortic valve is normal in structure and function.     Tricuspid Valve  The tricuspid valve is normal. Pulmonary artery systolic pressure cannot be  assessed.     Pulmonic Valve  The pulmonic valve is normal. Trace pulmonic insufficiency is present.     Vessels  The thoracic aorta is normal. The pulmonary artery and bifurcation cannot be  assessed. The inferior vena cava is normal.     Pericardium  No pericardial effusion is present.     Compared to Previous Study  No significant changes noted.  ______________________________________________________________________________  MMode/2D Measurements & Calculations  IVSd: 0.78 cm  LVIDd: 4.7 cm  LVIDs: 2.7 cm  LVPWd: 0.97 cm  FS: 41.3 %  LV mass(C)d: 134.9 grams  LV mass(C)dI: 71.1 grams/m2  Ao root diam: 2.5 cm  asc Aorta Diam: 2.6 cm  LVOT diam: 2.1 cm  LVOT area: 3.5 cm2  LA Volume (BP): 38.0 ml     LA Volume Index (BP): 20.0 ml/m2  RWT: 0.42     Doppler Measurements & Calculations  MV E max koffi: 78.6 cm/sec  MV A max koffi: 57.2 cm/sec  MV E/A: 1.4  MV dec slope: 344.0 cm/sec2  E/E' av.7  Lateral E/e': 5.0  Medial E/e': 8.3     ______________________________________________________________________________  Report approved by: Vazqeuz Watkins 2022 04:27 PM         US  Renal Complete w Duplex Complete    Narrative    RENAL COMPLETE WITH DUPLEX ULTRASOUND 3/15/2022 9:40 AM    CLINICAL HISTORY: Assess for renal hypertension and etiologies, would  like with arterial velocities.      COMPARISONS: MR 3/12/2022.    ORDERING PROVIDER: TAYLA HIGGINBOTHAM    TECHNIQUE: B-mode (grayscale) and duplex Doppler evaluation of the  abdominal aorta and renal arteries performed. Velocity measurements  obtained with angle correction at or less than 60 degrees.     Findings:    AORTA:       Suprarenal: 116/0 cm/s, triphasic       Infrarenal: 96/0 cm/s, biphasic    RIGHT KIDNEY:       Renal artery:            Origin: Not visualized             Mid: Not visualized             Hilum: 101/31 cm/s         Renal artery - aortic ratio: 0.87         Renal vein: 34 cm/s         Length: 11.0 cm         Cortical thickness: 1.0 cm         Arcuate artery resistive index (superior / mid / inferior): 0.68  / 0.76 / 0.63         Parenchyma: Normal. No stone, mass, or hydronephrosis.    LEFT KIDNEY:       Renal artery:            Origin: Not visualized            Mid: 81/26 cm/s            Hilum: 94/39 cm/s         Renal artery - aortic ratio: 0.81         Renal vein: 24 cm/s         Length: 10.6 cm         Cortical thickness: 1.4 cm         Arcuate artery resistive index (superior / mid / inferior): 0.61  / 0.67 / 0.57         Parenchyma: Normal. No stone, mass, or hydronephrosis.    Bladder: Not visualized.      Impression    IMPRESSION:   1. Right renal artery origin and mid segment not visualized. Left  renal artery origin not visualized. No renal artery stenosis  demonstrated in the visualized renal artery segments.    2. Right mid arcuate artery resistive index elevated. Other arcuate  artery resistive indices normal.    Guidelines:  Diagnostic criteria suggestive of > 60% diameter stenosis  Renal artery:       Peak systolic velocity > 180 cm/s       RAR > 3.5       Turbulent flow    Arcuate artery Resistive  Index Normal < 0.7    I have personally reviewed the examination and initial interpretation  and I agree with the findings.    HERMAN SUAREZ MD         SYSTEM ID:  [id number]     Medications

## 2022-03-15 NOTE — PROGRESS NOTES
[facility]  General Infectious Disease Progress Note- [place] Team     Patient:  Stephon Wolfe, Date of birth 1995, Medical record number [id number]  Date of Visit:  March 15, 2022         Assessment and Recommendations:   Problem List:  1. 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 Nausea, vomiting, resolved  3. Abdominal pain, resolved  4. Latent tuberculosis  5. Albuminuria  6. Cough w/ green sputum production  7. Hx Prediabetes  8. Hx headache, back pain/joint pain, resolved  9. H/o Bipolar disorder    Discussion:  Stephon Wolfe is a 25 y/o male with PMHx prediabetes, tension HA, back/hip/knee pain, and bipolar d/o who presented to the ER on 3/12/2022 with emesis and abdominal pain x1 day, initial workup significant for abdominal aortitis on CT A/P extending from the SMA to the MORGAN. CXR negative. MRA Abdomen obtained shows segmental wall thickening of the abdominal aorta with possible extension into SMA and renal arteries, questionable early retroperitoneal fibrosis, but no significant periadventitial inflammation.     Agree with broad differential for aortitis explored by the primary team including infectious etiologies, vasculopathies and other autoimmune-related conditions. Given the lack of aneurysm on imaging and negative blood cultures, infectious etiology appears less likely however do agree with pursuing broad infectious workup. In terms of infectious etiologies, most commonly involved pathogens are Salmonella, Staph aureus, Strep pneumonia, Mycobacterium tuberculosis, HIV, and Treponema pallidum. Treponema was negative on admission. The patient did report occasional diarrhea prior to admission, possibly concerning for Salmonella infection although diarrhea is now resolved and blood cultures remain negative to date. Given Hx born in [facility] and frequent travel to [facility] and [facility], agree with TB rule out in this patient. Other rare etiologies to consider:  Coxiella, fungal infection including cryptococcus.    Quant TB gold titer is weakly positive, c/w latent tuberculosis (CXR negative). Low suspicion that latent tuberculosis would have caused this aortitis. The patient was unable to recall prior treatment for TB, would recommend eventual treatment for latent tuberculosis, non-urgent, can be arranged in outpatient ID clinic. Otherwise, infectious workup is unremarkable.    Recommendations:  1. Obtain 1 or 2 AFB Blood cultures prior to discharge  2. ID will follow-up pending infectious studies, does not require patient to stay inpatient:              - Blood cultures x2 3/14 and 3/15              - ASO Strep titer              - Mycoplasma IgG and IgM antibodies              - Coxiella antibody IGG              - Fungitell   - AFB blood culture(s)  3. Repeat imaging of aortitis as planned per rheumatology  4. If consideration of steroids by rheumatology, please notify ID - pt would require treatment for latent TB  5. Follow-up with ID in clinic in 3 months upon discharge, plan for treatment for latent TB at that time unless indicated prior.     Thank you for this consult. ID will continue to follow.      Patient was discussed with Attending physician Dr. Vidal.      Pili Sanford  PGY-1 [facility] Internal Medicine  [phone]        Interval History:   Chart reviewed; NAEON. Pt reports that he is feeling well, no current cough/fever/chills, etc. No night sweats or weight loss. No abdominal pain, N/V/D. The patient asks when he can discharge home. Discussed TB history; he received healthcare at [facility] prior to 2016 but is unable to recall where he received initial TB testing, could not recall any prior tx for TB.           Review of Systems:   CONSTITUTIONAL:  No fevers or chills  EYES: negative for icterus  ENT:  negative for oral lesions, hearing loss, tinnitus and sore throat  RESPIRATORY:  negative for cough and dyspnea  CARDIOVASCULAR:  negative for chest pain,  palpitations  GASTROINTESTINAL:  negative for nausea, vomiting, diarrhea and constipation  GENITOURINARY:  negative for dysuria  HEME:  No easy bruising/bleeding  INTEGUMENT:  negative for rash and pruritus  NEURO:  Negative for headache        Antimicrobials:   None         Physical Exam:   Ranges for vital signs:  Temp:  [97.4  F (36.3  C)-98.4  F (36.9  C)] 98.4  F (36.9  C)  Pulse:  [60-65] 60  Resp:  [18-20] 18  BP: ()/(62-74) 104/68  SpO2:  [100 %] 100 %    Exam:  GENERAL:  well-developed, well-nourished, in no acute distress.   ENT:  Head is normocephalic, atraumatic. Oropharynx is moist without exudates or ulcers.  EYES:  Eyes have anicteric sclerae. PERRL.   NECK:  Supple. No cervical lymphadenopathy  LUNGS:  Clear to auscultation bilaterally. No wheezes or crackles.  CARDIOVASCULAR:  Regular rate and rhythm with no murmurs, gallops or rubs.  ABDOMEN:  Normal bowel sounds, soft, nontender. No hepatosplenomegaly.   EXT: Extremities warm and without edema.  SKIN:  No acute rashes.   NEUROLOGIC:  Grossly nonfocal.           Laboratory Data:     Creatinine   Date Value Ref Range Status   03/13/2022 0.71 0.66 - 1.25 mg/dL Final   03/12/2022 0.83 0.66 - 1.25 mg/dL Final   03/06/2021 0.87 0.66 - 1.25 mg/dL Final     Creatinine POCT   Date Value Ref Range Status   03/12/2022 1.0 0.7 - 1.3 mg/dL Final     WBC Count   Date Value Ref Range Status   03/12/2022 5.5 4.0 - 11.0 10e3/uL Final     Hemoglobin   Date Value Ref Range Status   03/12/2022 15.5 13.3 - 17.7 g/dL Final   03/06/2021 15.7 13.3 - 17.7 g/dL Final     Platelet Count   Date Value Ref Range Status   03/12/2022 244 150 - 450 10e3/uL Final     Erythrocyte Sedimentation Rate   Date Value Ref Range Status   03/12/2022 8 0 - 15 mm/hr Final     CRP Inflammation   Date Value Ref Range Status   03/12/2022 6.3 0.0 - 8.0 mg/L Final     AST   Date Value Ref Range Status   03/12/2022 18 0 - 45 U/L Final     ALT   Date Value Ref Range Status   03/12/2022 19 0 -  70 U/L Final     Bilirubin Total   Date Value Ref Range Status   03/12/2022 0.5 0.2 - 1.3 mg/dL Final     Lab Results   Component Value Date     03/13/2022    BUN 6 (L) 03/13/2022    CO2 26 03/13/2022            Microbiology:     Completed  - Blood cultures x2 3/12/2022 w/ no growth after 1 day  - UA 3/12 mild protein, no s/o infection  - HIV (-)  - Hepatitis panel (-)  - Treponema ab (-) 3/12    Pending  - Blood cultures x2 3/14 and 3/15 pending  - Respiratory aerobic culture (not yet collected)  - ASO Strep titer  - Mycoplasma IgG and IgM antibodies  - Coxiella antibody IGG  - Fungitell  - Cryptococcus antigen  - Enteric panel (not yet collected)  - Quant TB         Imaging:     CT A/P 3/12/2022:                                        Impression:   Segmental wall thickening of the juxtarenal abdominal aorta causing  mild luminal narrowing without associated periaortic abnormality.  There is enhancement of the wall without significant periadventitial  inflammation. Consider vasculitis and other non-infectious aortitis.  Consider evaluation of large vessels in the chest to help narrow  differential, if aortic arch, cerebral vessels or pulmonary arteries  may be involved.      MRA Abdomen wo and w/ contrast 3/12/2022:  Impression:   Segmental wall thickening of the juxtarenal abdominal aorta causing  mild luminal narrowing without associated periaortic abnormality.  There is enhancement of the wall without significant periadventitial  inflammation. Consider vasculitis and other non-infectious aortitis.  Consider evaluation of large vessels in the chest to help narrow  differential, if aortic arch, cerebral vessels or pulmonary arteries  may be involved.      CXR 3/12/2022:  IMPRESSION: No radiographic evidence for hilar or mediastinal  lymphadenopathy. No acute airspace or interstitial pulmonary opacity.

## 2022-03-15 NOTE — PLAN OF CARE
Goal Outcome Evaluation:    Plan of Care Reviewed With: patient     Overall Patient Progress: improving    Outcome Evaluation: Pt w/ no c/o pain this shift.    AOx4. Able to make needs known. Stable on RA. NPO since midnight with sips of water per order can have clears up to 2 hours prior to US. Voiding adequately. No BM overnight. Awaiting stool sample and sputum sample. Cymraes speaking but speaks English well. No IV Access MD aware. IND in room.

## 2022-03-16 ENCOUNTER — APPOINTMENT (OUTPATIENT)
Dept: MRI IMAGING | Facility: CLINIC | Age: 27
End: 2022-03-16
Payer: COMMERCIAL

## 2022-03-16 LAB
1,3 BETA GLUCAN SER-MCNC: 93 PG/ML
ASO AB SERPL-ACNC: 64 IU/ML
ATRIAL RATE - MUSE: 60 BPM
DIASTOLIC BLOOD PRESSURE - MUSE: NORMAL MMHG
HOLD SPECIMEN: NORMAL
HOLD SPECIMEN: NORMAL
INTERPRETATION ECG - MUSE: NORMAL
OBSERVATION IMP: POSITIVE
P AXIS - MUSE: 62 DEGREES
PR INTERVAL - MUSE: 130 MS
QRS DURATION - MUSE: 96 MS
QT - MUSE: 382 MS
QTC - MUSE: 382 MS
R AXIS - MUSE: 47 DEGREES
SYSTOLIC BLOOD PRESSURE - MUSE: NORMAL MMHG
T AXIS - MUSE: 41 DEGREES
VENTRICULAR RATE- MUSE: 60 BPM

## 2022-03-16 PROCEDURE — 250N000013 HC RX MED GY IP 250 OP 250 PS 637

## 2022-03-16 PROCEDURE — 87116 MYCOBACTERIA CULTURE: CPT | Performed by: STUDENT IN AN ORGANIZED HEALTH CARE EDUCATION/TRAINING PROGRAM

## 2022-03-16 PROCEDURE — 99233 SBSQ HOSP IP/OBS HIGH 50: CPT | Mod: GC | Performed by: INTERNAL MEDICINE

## 2022-03-16 PROCEDURE — 999N000128 HC STATISTIC PERIPHERAL IV START W/O US GUIDANCE

## 2022-03-16 PROCEDURE — 120N000002 HC R&B MED SURG/OB UMMC

## 2022-03-16 PROCEDURE — 71555 MRI ANGIO CHEST W OR W/O DYE: CPT | Mod: 26 | Performed by: RADIOLOGY

## 2022-03-16 PROCEDURE — 82784 ASSAY IGA/IGD/IGG/IGM EACH: CPT | Performed by: STUDENT IN AN ORGANIZED HEALTH CARE EDUCATION/TRAINING PROGRAM

## 2022-03-16 PROCEDURE — 82787 IGG 1 2 3 OR 4 EACH: CPT | Performed by: STUDENT IN AN ORGANIZED HEALTH CARE EDUCATION/TRAINING PROGRAM

## 2022-03-16 PROCEDURE — 255N000002 HC RX 255 OP 636: Performed by: INTERNAL MEDICINE

## 2022-03-16 PROCEDURE — A9585 GADOBUTROL INJECTION: HCPCS | Performed by: INTERNAL MEDICINE

## 2022-03-16 PROCEDURE — 87103 BLOOD FUNGUS CULTURE: CPT | Performed by: STUDENT IN AN ORGANIZED HEALTH CARE EDUCATION/TRAINING PROGRAM

## 2022-03-16 PROCEDURE — 250N000013 HC RX MED GY IP 250 OP 250 PS 637: Performed by: STUDENT IN AN ORGANIZED HEALTH CARE EDUCATION/TRAINING PROGRAM

## 2022-03-16 PROCEDURE — 71555 MRI ANGIO CHEST W OR W/O DYE: CPT

## 2022-03-16 PROCEDURE — 87185 SC STD ENZYME DETCJ PER NZM: CPT | Performed by: STUDENT IN AN ORGANIZED HEALTH CARE EDUCATION/TRAINING PROGRAM

## 2022-03-16 PROCEDURE — 87205 SMEAR GRAM STAIN: CPT | Performed by: STUDENT IN AN ORGANIZED HEALTH CARE EDUCATION/TRAINING PROGRAM

## 2022-03-16 PROCEDURE — 36415 COLL VENOUS BLD VENIPUNCTURE: CPT | Performed by: STUDENT IN AN ORGANIZED HEALTH CARE EDUCATION/TRAINING PROGRAM

## 2022-03-16 RX ORDER — GADOBUTROL 604.72 MG/ML
10 INJECTION INTRAVENOUS ONCE
Status: COMPLETED | OUTPATIENT
Start: 2022-03-16 | End: 2022-03-16

## 2022-03-16 RX ORDER — GUAIFENESIN/DEXTROMETHORPHAN 100-10MG/5
10 SYRUP ORAL EVERY 6 HOURS PRN
Status: DISCONTINUED | OUTPATIENT
Start: 2022-03-16 | End: 2022-03-17 | Stop reason: HOSPADM

## 2022-03-16 RX ADMIN — BENZONATATE 100 MG: 100 CAPSULE ORAL at 17:57

## 2022-03-16 RX ADMIN — GUAIFENESIN AND DEXTROMETHORPHAN 10 ML: 100; 10 SYRUP ORAL at 22:31

## 2022-03-16 RX ADMIN — GADOBUTROL 10 ML: 604.72 INJECTION INTRAVENOUS at 15:14

## 2022-03-16 ASSESSMENT — ACTIVITIES OF DAILY LIVING (ADL)
ADLS_ACUITY_SCORE: 4

## 2022-03-16 NOTE — PLAN OF CARE
Goal Outcome Evaluation:    Plan of Care Reviewed With: patient     Overall Patient Progress: improving    Outcome Evaluation: pt Denies pain this shift.    [id number]    A&Ox4, VSS on RA, Ind in room.  MRA completed this shift for possible vasculitis.  Tessalon given for throat discomfort/cough.

## 2022-03-16 NOTE — PLAN OF CARE
Goal Outcome Evaluation:    Plan of Care Reviewed With: patient     Overall Patient Progress: no change    Outcome Evaluation: AxOx4, VSS. c/o new pain in the RLE/foot. relieved with Ice and tylenol. Sputum culture sent, results pending.

## 2022-03-16 NOTE — PROGRESS NOTES
[facility]  General Infectious Disease Progress Note- [place] Team     Patient:  Stephon Wolfe, Date of birth 1995, Medical record number [id number]  Date of Visit:  March 15, 2022         Assessment and Recommendations:   Problem List:  1. 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 Nausea, vomiting, resolved  3. Abdominal pain, resolved  4. Latent tuberculosis  5. Albuminuria  6. Cough w/ green sputum production  7. Hx Prediabetes  8. Hx headache, back pain/joint pain, resolved  9. H/o Bipolar disorder    Discussion:  Stephon Wolfe is a 27 y/o male with PMHx prediabetes, tension HA, back/hip/knee pain, and bipolar d/o who presented to the ER on 3/12/2022 with emesis and abdominal pain x1 day, initial workup significant for abdominal aortitis on CT A/P extending from the SMA to the MORGAN. CXR negative. MRA Abdomen obtained shows segmental wall thickening of the abdominal aorta with possible extension into SMA and renal arteries, questionable early retroperitoneal fibrosis, but no significant periadventitial inflammation.     Agree with broad differential for aortitis explored by the primary team including infectious etiologies, vasculopathies and other autoimmune-related conditions. Given the lack of aneurysm on imaging and negative blood cultures, infectious etiology appears less likely however do agree with pursuing broad infectious workup. In terms of infectious etiologies, most commonly involved pathogens are Salmonella, Staph aureus, Strep pneumonia, Mycobacterium tuberculosis, HIV, and Treponema pallidum. Treponema was negative on admission. The patient did report occasional diarrhea prior to admission, possibly concerning for Salmonella infection although diarrhea is now resolved and blood cultures remain negative to date. Given Hx born in [facility] and frequent travel to [facility] and [facility], agree with TB rule out in this patient. Other rare etiologies to consider:  Coxiella, fungal infection including cryptococcus.    Quant TB gold titer is positive, c/w latent tuberculosis (CXR negative). Low suspicion that latent tuberculosis would have caused this aortitis. The patient was unable to recall prior treatment for TB, would recommend eventual treatment for latent tuberculosis, non-urgent, can be arranged in outpatient ID clinic. Otherwise, infectious workup is unremarkable.    Recommendations:  1. Start Rifampin 600mg daily for x 4 months  2. Hold any immunosuppresants until LTBI treatment complete  3. Repeat TBQuant towards end of LTBI treatment course (given peculiar initial result)  4. ID follow-up with Dr. Sahu on 6/29/22   5. ID will follow-up pending infectious studies, does not require patient to stay inpatient:              - AFB blood cultures   - Blood cultures x2 3/14 and 3/15              - ASO Strep titer              - Mycoplasma IgG and IgM antibodies              - Coxiella antibody IGG              - Fungitell   - AFB blood culture(s)  6. Repeat imaging of aortitis as planned per rheumatology       Thank you for this consult. ID will sign off.      Patient was discussed with Attending physician Dr. Vidal.        Buck Brown DO, MPH  PGY4 Infectious Disease Fellow  Pager: [phone]  Vocera preferred          Interval History:   Chart reviewed.  Patient seen and examined at bedside.  No new fevers, chills, chest pains, shortness of breath, skin rashes, joint pains, lightheadedness.  Discussed treating for LTBI with him and family members on the phone.         Review of Systems:   See interval history        Antimicrobials:   None         Physical Exam:   Ranges for vital signs:  Temp:  [97.3  F (36.3  C)-98  F (36.7  C)] 98  F (36.7  C)  Pulse:  [71-73] 73  Resp:  [16-18] 16  BP: (106-117)/(67-80) 117/80  SpO2:  [100 %] 100 %    Exam:  GENERAL:  well-developed, well-nourished, in no acute distress.   ENT:  Head is normocephalic, atraumatic. Oropharynx  is moist without exudates or ulcers.  EYES:  Eyes have anicteric sclerae. EOMI   NECK:  Supple.   LUNGS:  Clear to auscultation bilaterally. No wheezes or crackles.  CARDIOVASCULAR:  Regular rate and rhythm with no murmurs, gallops or rubs.  ABDOMEN:  Normal bowel sounds, soft, nontender. No hepatosplenomegaly.   EXT: Extremities warm and without edema.  SKIN:  No acute rashes.   NEUROLOGIC:  Grossly nonfocal.           Laboratory Data:     Creatinine   Date Value Ref Range Status   03/13/2022 0.71 0.66 - 1.25 mg/dL Final   03/12/2022 0.83 0.66 - 1.25 mg/dL Final   03/06/2021 0.87 0.66 - 1.25 mg/dL Final     Creatinine POCT   Date Value Ref Range Status   03/12/2022 1.0 0.7 - 1.3 mg/dL Final     WBC Count   Date Value Ref Range Status   03/12/2022 5.5 4.0 - 11.0 10e3/uL Final     Hemoglobin   Date Value Ref Range Status   03/12/2022 15.5 13.3 - 17.7 g/dL Final   03/06/2021 15.7 13.3 - 17.7 g/dL Final     Platelet Count   Date Value Ref Range Status   03/12/2022 244 150 - 450 10e3/uL Final     Erythrocyte Sedimentation Rate   Date Value Ref Range Status   03/12/2022 8 0 - 15 mm/hr Final     CRP Inflammation   Date Value Ref Range Status   03/12/2022 6.3 0.0 - 8.0 mg/L Final     AST   Date Value Ref Range Status   03/12/2022 18 0 - 45 U/L Final     ALT   Date Value Ref Range Status   03/12/2022 19 0 - 70 U/L Final     Bilirubin Total   Date Value Ref Range Status   03/12/2022 0.5 0.2 - 1.3 mg/dL Final     Lab Results   Component Value Date     03/13/2022    BUN 6 (L) 03/13/2022    CO2 26 03/13/2022            Microbiology:     Completed  - Blood cultures x2 3/12/2022 w/ no growth after 1 day  - UA 3/12 mild protein, no s/o infection  - HIV (-)  - Hepatitis panel (-)  - Treponema ab (-) 3/12    Pending  - Blood cultures x2 3/14 and 3/15 pending  - Respiratory aerobic culture (not yet collected)  - ASO Strep titer  - Mycoplasma IgG and IgM antibodies  - Coxiella antibody IGG  - Fungitell  - Cryptococcus antigen  -  Enteric panel (not yet collected)  - Quant TB         Imaging:     CT A/P 3/12/2022:                                        Impression:   Segmental wall thickening of the juxtarenal abdominal aorta causing  mild luminal narrowing without associated periaortic abnormality.  There is enhancement of the wall without significant periadventitial  inflammation. Consider vasculitis and other non-infectious aortitis.  Consider evaluation of large vessels in the chest to help narrow  differential, if aortic arch, cerebral vessels or pulmonary arteries  may be involved.      MRA Abdomen wo and w/ contrast 3/12/2022:  Impression:   Segmental wall thickening of the juxtarenal abdominal aorta causing  mild luminal narrowing without associated periaortic abnormality.  There is enhancement of the wall without significant periadventitial  inflammation. Consider vasculitis and other non-infectious aortitis.  Consider evaluation of large vessels in the chest to help narrow  differential, if aortic arch, cerebral vessels or pulmonary arteries  may be involved.      CXR 3/12/2022:  IMPRESSION: No radiographic evidence for hilar or mediastinal  lymphadenopathy. No acute airspace or interstitial pulmonary opacity.

## 2022-03-16 NOTE — PROGRESS NOTES
I, Federica Dsouza, was present with the medical/LIN[employer] student who participated in the service and in the documentation of the note.  I have verified the history and personally performed the physical exam and medical decision making.  I agree with the assessment and plan of care as documented in the note.     26 year old male with not much significant pmhx presenting with emesis. 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 Afebrile, vitally stable. States he feels well, no physical complaints. Benign physical exam. DDX includes Takayasu's vs idiopathic etiology. Rheumatology following, rheumatologic work up negative thus far, though inflammatory markers do not need to necessarily be elevated in Takayasu's. IgG subclasses were not obtained yet. ID following, TB Quantiferon weakly positive, unlikely to be the etiology of his aortitis given he has not had an active TB infection, ID states he will need to be treated for latent TB treatment prior to immunosuppression. Patient family thinks he has been treated for latent TB upon emigration to [place] in 2006, they are working on obtaining records of this. If he has been treated for TB he could be started on prednisone for Takayasu's Arteritis. Will need to obtain MRA of aortic tree, subclavian arteries, and carotid arteries to investigate if aortitis is present in these areas which could pose a risk for dissection proximal to the hear which would carry a very high mortality rate. Patient with positive Beta D glucan in blood, low suspicion for active fungal infection given patient is generally very stable and well, this could represent a false positive, but will obtain fungal blood culture with plans to touch base with ID tomorrow regarding abnormal lab. Would consider touching base with Rheum if patient is started on steroids prior to discharge.           Federica Dsouza, DO  PGY2  Date of Service (when I saw the patient): 3/16/22      [facility]  "Of [facility]    Progress Note - Medicine Service, [facility] TEAM 2       Date of Admission:  3/12/2022    Assessment & Plan          Stephon Wolfe is a 26 year old male admitted on 3/12/2022. He has a hx of PMH of prediabetes, tension headaches, back pain, hip and knee pain, and bipolar disorder admitted from the ED on 3/12/2022 for emesis and abdominal pain. 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    Changes for today:  - ID recommending Rifampin 600 mg daily for latent TB tx post discharge  - IgG subclass labs ordered  - MRA chest today   - Rheum suggesting pt f/u after discharge   - F/u with sister regarding information/paperwork confirming TB treatment, Patient potentially treated for latent TB in past  - Serum Beta D glucan positive, obtained blood fungal culture     # Nausea, emesis, resolved  # Abdominal pain, resolved  He has no prior history of GI disturbance or similar GI symptoms. States that he had more N/V vs abdominal pain. He attributes it to possible poisoning after eating \"cow meat,\" vomited 3x two hours after ingestion. Symptoms have completely resolved, abdomen non-tender on exam. Last bowel movement 3/11/22 around 11 PM, no diarrhea noted. Emesis may be related to aortitis (discussed below) given that there is possible extension into SMA, although would expect pain more than N/V.  - Miralax and Senna-docusate PRN  - Zofran PRN  - Regular diet  - Continue to monitor symptoms     # Aortitis  Differential ddx is broad and includes large vessel vasculopathies (Takayusa v. GCA), medium vessel vasculopathies, Behcet syndrome, Jayro syndrome, IgG4-related disease, spondyloarthritis, sarcoidosis, fibromuscular dysplasia, polychondritis, Salmonella infection, and syphilis. 3/12/22 labs: non-elevated CRP = 6.3 and ESR = 8, CMP and CBC wnl. Treponema Ab negative. UA showed albuminuria. Has LVH on EKG but no evidence of HTN " on vital signs. Rheum consulted, recommended additional workup including ALEXANDER, ANCA, Hep B/C, IgG panel, all pending. CXR negative for lymphadenopathy. MRA abdomen with segmental wall thickening of the abdominal aorta with possible extension into SMA and renal arteries. Questionable early retroperitoneal fibrosis. Radiology suspicious of inflammatory etiology, thought infectious less likely, BCX x2 NGTD. There was no evidence of hematoma or dissection. TTE on 3/12 no abnormalities, normal aorta. EKGs with no concerning findings. Patient Hb stable and hemodynamically stable. Curbsided vascular surgery, no urgent surgical intervention recommended, but was recommending comprehensive work up for aortitis given severity of complications. Takayasu arteritis highest on the differential. Retroperitoneal fibrosis could also be an explanation as well. Also possible that this is idiopathic aortitis, but is a diagnosis of exclusion.    - Spoke with Rheum: they do not recommend treating for Takayasu. Do suggest patient f/u with Rheum after discharge in 2-3 months.   - Appreciate ID consult, appreciate recs  - IgG/IgM levels, ANCA, ALEXANDER, Hep C Ab, Hep B surface Ag, Hep B surface Ab, HIV, treponema abs, ASO abs, TSH, BCx x2, Sputum Cx, crypto Ag all negative or wnl  - IgA levels intermediately low, serum beta D glucan positive, Quant gold weakly positive  - Pending Labs: AFB blood, Mycoplasma, Coxiella  - Obtain IgG subclasses and Fungal blood cultures  -  ID appreciated recommendations:    - latent tuberculosis (CXR negative). Low suspicion that latent tuberculosis would have caused this aortitis.    - Obtain 1 or 2 AFB blood cultures prior to discharge   - F/u with ID in 3 months     - Rifampin 600 mg daily for latent TB tx post discharge  - IgG and IgG subclasses ordered  - MRA chest today to investigateTakayasu related inflammation in carotid/subclavian/proximal aorta    #Latent TB  Patient TB Quantiferon test weakly positive.  CXR negative. TB blood test pending. ID recommends rifampin 600mg Qd for 4 months. Patient sister reported her and patient were treated for latent TB when they arrived to the [place] in 2006.   - F/u with sister regarding information/paperwork confirming TB treatment.      # Hx of headaches  # Hx back pain, knee pain  Has not had a headache for over a year. Historically relieved by Tylenol.Back pain once last year, now resolved. Hip and knee pain in prior years related to soccer injuries, better with PT. This AM has complaints of bilateral frontal headache and eye pressure, now resolved.  - Tylenol PRN     # Albuminuria  In setting of non-elevated Cr and no edema, no other contributing medical problems. Could have a rheumatologic etiology.Patient proteinuria could be transient, or due to orthostatic hypotension.Will assess urine protein/CR ratio in the afternoon when patient is mobile, and in the AM. MRA abdomen did indicate possible extension of wall thickening into renal arteries. Possible DDx includes renal HTN related to aortitis.  Renal US could not visualize the right renal artery origin and mulu segment. Also did not visualize left renal ar origin. No renal artery stenosis demonstrated in the visualized renal artery segments. Additionally, right mid arcuate artery resistive index elevated. Other arcuate artery resistive indices normal.  - Urine protein/Cr ratio in afternoon and in AM both normal  - Renal US 3/15 negative for renal HTN related to aortitis, generally appears normal       # Chest pain  # Hx Abnormal EKG findings  EKG on 3/6/21 demonstrated findings consistent with LVH which has been seen on previous EKGs, blood pressure has been normal, looking at outpatient records, does not seem patient has been hypertensive to explain LVH. Previous Echo wnl with no ventricular thickening and troponin normal. Patient with reproducible chest wall tenderness/pressure on left on 3/14, no signs of evidence,  EKG looks same as previous, trop normal all VSS. TTE on 3/14 with normal EF and no abnormalities. Chest pain most likely has a MSK origin such as costochondritis. MRA found aortitis in abdomen, but patient chest pain could be related to possible thoracic aortitis. D Dimer today was normal  Suggesting etiology of chest pain is less like due to aortic pathology.   - Cardio Curbside: Did not recommend a consult. Reported EKG abnormalities due to early repolarizations, which is a normal variant. Findings are not new, and are not concerning right now.  - Deferring to rheum for aortitis work up.  - D Dimer today was <0.37    #Cough  Patient reported cough with green sputum production. Denies any fever/chills, sore throat or wheezing/SOB. Patient is afebrile,and CRP is 6.3.   - sputum culture negative   - blood culture negative     #Partial IgA Deficiency  Patient has an isolated deficiency of serum IgA (45 mg/dL) in the presence of normal IgG and IgM levels. His IgA level is greater than 7 mg/dL, but below the lower limit of normal, indicating he has a partial rather than a severe deficiency.     Diet: Regular Diet Adult    DVT Prophylaxis: Low Risk/Ambulatory with no VTE prophylaxis indicated  Gonzales Catheter: Not present  Fluids: none  Central Lines: None  Cardiac Monitoring: None  Code Status: Full Code      Disposition Plan   Expected Discharge: 03/16/2022     Anticipated discharge location: 1-2 days pending MRA chest final read and determining steroid vs latent TB treatment plan  Delays: Awaiting records of previous latent TB treatment       The patient's care was discussed with the Attending Physician, Dr. Johnson, Bedside Nurse and Patient.    James Pham  Medicine Service, [facility] TEAM 2  [facility]  Securely message with the Vocera Web Console (learn more here)  Text page via [employer] Paging/Directory   Please see signed in provider for up to date coverage information                  ______________________________________________________________________    Interval History    NAEO. Patient generally feels well. He reports no chest pain chills/fever, sore throat, wheezing or SOB. He also denies any abdominal pain, N/V, diarrhea, pain in joints or body, and  Rashes. The primary team discussed with the patient that he needs treatment for latent TB after discharge. Also discussed MRA of the chest and neck needed to assess for inflammation in those areas. Patient understood, and receptive to the information.      4 point ROS negative except as mentioned above.     Data reviewed today: I reviewed all medications, new labs and imaging results over the last 24 hours. TTE 3/14 with EF of 60-65%, normal chambers, normal thoracic aorta.    Physical Exam   Vital Signs: Temp: 98  F (36.7  C) Temp src: Oral BP: 117/80 Pulse: 73   Resp: 16 SpO2: 100 % O2 Device: None (Room air)    Weight: 160 lbs 0 oz  GENERAL: Patient resting comfortably. In no acute distress.  HENT: Head atraumatic, normocephalic. Eyes without scleral icterus, EOM intact. Neck supple, no cervical lymphadenopathy.  CARDIOVASCULAR: RRR, normal S1S2, no murmur, clicks, or rubs. No supraclavicular, infraclavicular, abdominal, or femoral bruits. Radial pulses are equal bilaterally.   RESPIRATORY: Clear to auscultation bilaterally, no wheezes, or rhonchi. On no supplemental oxygen.  ABDOMEN: Soft, non-tender, bowel sounds present, no masses appreciated.  GENITOURINARY: no Gonzales catheter in place.  EXTREMITIES: Warm and well perfused, no edema.  SKIN: Not jaundiced, no rash, no ecchymoses.  NEURO: Awake, alert, and responding to questions appropriately. Moving all extremities spontaneously. No lateralizing symptoms or focal neurologic deficits.  PSYCH: Normal affect and thought process.    Data   Recent Labs   Lab 03/13/22  0737 03/12/22  0108 03/12/22  0103   WBC  --   --  5.5   HGB  --   --  15.5   MCV  --   --  87   PLT  --   --  244      --  138   POTASSIUM 3.6  --  3.4   CHLORIDE 106  --  103   CO2 26  --  32   BUN 6*  --  10   CR 0.71 1.0 0.83   ANIONGAP 6  --  3   AMANDA 8.8  --  9.2   *  --  88   ALBUMIN  --   --  4.1   PROTTOTAL  --   --  8.6   BILITOTAL  --   --  0.5   ALKPHOS  --   --  100   ALT  --   --  19   AST  --   --  18   LIPASE  --   --  79     No results found for this or any previous visit (from the past 24 hour(s)).  Medications

## 2022-03-17 ENCOUNTER — NURSE TRIAGE (OUTPATIENT)
Dept: NURSING | Facility: CLINIC | Age: 27
End: 2022-03-17
Payer: COMMERCIAL

## 2022-03-17 VITALS
HEIGHT: 70 IN | HEART RATE: 76 BPM | SYSTOLIC BLOOD PRESSURE: 102 MMHG | DIASTOLIC BLOOD PRESSURE: 66 MMHG | BODY MASS INDEX: 22.9 KG/M2 | OXYGEN SATURATION: 100 % | WEIGHT: 160 LBS | RESPIRATION RATE: 16 BRPM | TEMPERATURE: 97.6 F

## 2022-03-17 LAB
ALBUMIN SERPL-MCNC: 3.9 G/DL (ref 3.4–5)
ALP SERPL-CCNC: 88 U/L (ref 40–150)
ALT SERPL W P-5'-P-CCNC: 19 U/L (ref 0–70)
ANION GAP SERPL CALCULATED.3IONS-SCNC: 5 MMOL/L (ref 3–14)
AST SERPL W P-5'-P-CCNC: 16 U/L (ref 0–45)
BACTERIA BLD CULT: NO GROWTH
BACTERIA BLD CULT: NO GROWTH
BILIRUB SERPL-MCNC: 1 MG/DL (ref 0.2–1.3)
BUN SERPL-MCNC: 11 MG/DL (ref 7–30)
CALCIUM SERPL-MCNC: 9.5 MG/DL (ref 8.5–10.1)
CHLORIDE BLD-SCNC: 105 MMOL/L (ref 94–109)
CO2 SERPL-SCNC: 28 MMOL/L (ref 20–32)
CREAT SERPL-MCNC: 0.77 MG/DL (ref 0.66–1.25)
ERYTHROCYTE [DISTWIDTH] IN BLOOD BY AUTOMATED COUNT: 12.6 % (ref 10–15)
GFR SERPL CREATININE-BSD FRML MDRD: >90 ML/MIN/1.73M2
GLUCOSE BLD-MCNC: 94 MG/DL (ref 70–99)
HCT VFR BLD AUTO: 44.5 % (ref 40–53)
HGB BLD-MCNC: 15 G/DL (ref 13.3–17.7)
MCH RBC QN AUTO: 29.6 PG (ref 26.5–33)
MCHC RBC AUTO-ENTMCNC: 33.7 G/DL (ref 31.5–36.5)
MCV RBC AUTO: 88 FL (ref 78–100)
PLATELET # BLD AUTO: 215 10E3/UL (ref 150–450)
POTASSIUM BLD-SCNC: 4.1 MMOL/L (ref 3.4–5.3)
PROT SERPL-MCNC: 8 G/DL (ref 6.8–8.8)
RBC # BLD AUTO: 5.06 10E6/UL (ref 4.4–5.9)
SODIUM SERPL-SCNC: 138 MMOL/L (ref 133–144)
WBC # BLD AUTO: 6 10E3/UL (ref 4–11)

## 2022-03-17 PROCEDURE — 80053 COMPREHEN METABOLIC PANEL: CPT | Performed by: INTERNAL MEDICINE

## 2022-03-17 PROCEDURE — 250N000013 HC RX MED GY IP 250 OP 250 PS 637: Performed by: STUDENT IN AN ORGANIZED HEALTH CARE EDUCATION/TRAINING PROGRAM

## 2022-03-17 PROCEDURE — 99239 HOSP IP/OBS DSCHRG MGMT >30: CPT | Mod: GC | Performed by: INTERNAL MEDICINE

## 2022-03-17 PROCEDURE — 36415 COLL VENOUS BLD VENIPUNCTURE: CPT | Performed by: INTERNAL MEDICINE

## 2022-03-17 PROCEDURE — 85027 COMPLETE CBC AUTOMATED: CPT | Performed by: INTERNAL MEDICINE

## 2022-03-17 PROCEDURE — 99233 SBSQ HOSP IP/OBS HIGH 50: CPT | Performed by: INTERNAL MEDICINE

## 2022-03-17 RX ORDER — RIFAMPIN 300 MG/1
600 CAPSULE ORAL DAILY
Status: DISCONTINUED | OUTPATIENT
Start: 2022-03-17 | End: 2022-03-17 | Stop reason: HOSPADM

## 2022-03-17 RX ORDER — RIFAMPIN 300 MG/1
600 CAPSULE ORAL DAILY
Qty: 240 CAPSULE | Refills: 0 | Status: SHIPPED | OUTPATIENT
Start: 2022-03-17 | End: 2022-03-17

## 2022-03-17 RX ADMIN — SENNOSIDES AND DOCUSATE SODIUM 2 TABLET: 50; 8.6 TABLET ORAL at 10:21

## 2022-03-17 ASSESSMENT — ACTIVITIES OF DAILY LIVING (ADL)
ADLS_ACUITY_SCORE: 4

## 2022-03-17 NOTE — PLAN OF CARE
"/84 (BP Location: Right arm)   Pulse 77   Temp 97.5  F (36.4  C) (Oral)   Resp 18   Ht 1.783 m (5' 10.2\")   Wt 72.6 kg (160 lb)   SpO2 100%   BMI 22.83 kg/m       Reason for admission: Aortitis  Activity: Independent.   Pain: Denied pain or discomfort.   Neuro: Alert and oriented.   Cardiac: WDL  Resp: Frequent non-productive cough, lung sounds clear bilaterally, reported tessalon was ineffective,  provider notified, ordered robitussin DM 10 ML every six hours PRN, and phenol 1.4% nasal spray BID PRN for sore throat, received Robitussin at bed time, and it was effective.   GI/: Voids spontaneously without difficulty, bowel sounds present x four quadrants.   Lines: No lines.   Skin: WDL  Labs/Imaging: No labs or imaging.    New changes to shift: No change.     Plan : Continue to monitor increased cough.                     "

## 2022-03-17 NOTE — PROGRESS NOTES
[facility]  [place] [facility] Progress Note -- Sign Off     Patient:  Stephon Wolfe, Date of birth 1995, Medical record number [id number]  Date of Visit:  March 17, 2022         Assessment and Recommendations:   Recommendations:  - Given what seems to be quite reliable history (from his sister) of treatment for latent TB infection with [facility] 11 [facility] he came to [place] from [facility], would not favor retreating Mr. Wolfe with LTBI therapy again.  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  - If he does eventually need treatment for Takayasu arteritis, would not retreat for LTBI, but would (to be overly cautious) check serial chest x-rays every two to three months x 2 while on immunosuppression, and would make certain the patient knows (just in expected case) the potential presenting symptoms of activated TB such as night sweats, persistent cough, etc (to report if he developed any of those).  - The history from the sister of prior LTBI treatment sounds quite convincing and the chance of finding documentation of that without a specific clinic name seems low, so agree with foregoing additional sleuthing for that.  - Given the history of prior LTBI treatment, a repeat Quantiferon Gold assay does not seem necessary.  - The low-positive Fungitell (93) is not high enough to be clinically significant or suggest a cause of his aortitis.  - He is presently scheduled for a [facility] follow-up appointment with Dr. Sahu on 6/29/22, although that may be unnecessary now.  - ID will plan to follow-up any pending infectious studies (AFB blood cultures, ASO titer, Mycoplasma IgG and IgM antibodies, Coxiella IgG) although we expect those to all be negative.  - Agree with the plan to  have him followed-up in Rheumatology Clinic in 1 - 1.5 months with a re-scan of his aorta at that time.    The case was discussed again today with the [address] service.  With the above Recommendations, General Infectious Disease will sign off now.  Thanks for allowing us to participate in the care of this gentleman.  Please page if additional ID questions or concerns arise.    Shayan Sahu MD  Pager [phone]    Problem List:  1. 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 Nausea, vomiting, resolved  3. Abdominal pain, resolved  4. Latent tuberculosis  5. Albuminuria  6. Cough w/ green sputum production  7. Hx Prediabetes  8. Hx headache, back pain/joint pain, resolved  9. H/o Bipolar disorder    Discussion:  A 25 y/o gentleman with PMHx prediabetes, tension HA, back/hip/knee pain, and bipolar d/o who presented to the ER on 3/12/2022 with emesis and abdominal pain x1 day, initial workup significant for abdominal aortitis on CT A/P extending from the SMA to the MORGAN. CXR negative. MRA Abdomen obtained shows segmental wall thickening of the abdominal aorta with possible extension into SMA and renal arteries, questionable early retroperitoneal fibrosis, but no significant periadventitial inflammation.     A broad differential for aortitis has been explored including infectious etiologies, vasculopathies and other autoimmune-related conditions. Given the lack of aneurysm on imaging and negative blood cultures, an infectious etiology appears radiographically less likely, however, we have pursued a broad infectious workup to be complete. In terms of infectious etiologies, most commonly involved pathogens are Salmonella, Staph aureus, Strep pneumonia, Mycobacterium tuberculosis, HIV, and Treponema pallidum. Treponema antibody screen was negative on admission and HIV screen is negative. He did report occasional diarrhea prior to admission, possibly concerning for Salmonella infection, although that  diarrhea was transient and resolved quickly.  Six blood cultures from 3/12 - 15/22 remain negative to date.  An ASO titer is negative making recently Streptococcal infection unlikely.  A 3/13/22 Fungitell assay is minimally (likely clinically insignificantly) positive at 93 with a negative cryptococcal serum antigen assay.  Other rare and unlikely infectious etiologies to consider with assays still pending: Coxiella burnetti, Mycoplasma.  AFB blood cultures are also still incubating.    Given his history of being born in [facility] [place] [facility] [place] and frequent travel since then back to [facility] and [facility], a Quantiferon Gold assay was obtained on 3/13/22 and was positive (with antigen 1 / 2 minus nil results as well as the mitogen minus control result all at 8.66).  Although he himself could not recall prior LTBI treatment, his sister recalls that they were both treated with six months of daily therapy for LTBI at a community immigrant screening clinic in the [facility] when they arrived in [place] when he was age 11.  Although that therapy can not be independently documented, that history is convincing enough that it can be taken as sufficient evidence of prior LTBI treatment, so he does not need re-treatment irrespective of whether he require immunosuppressive therapy in the future or not.  There is no indication of active TB and there is extremely little likelihood that TB is a cause of the aortitis.  After further discussion with the [facility] service, agree that his aortitis is either an inconsequential incidentaloma or a so-far asymptomatic manifestation of Takayasu arteritis.  Agree with the plan to have his followed up as an outpatient in Rheumatology Clinic in 4 - 6 weeks with a repeat aorta scan at that time.  If he needs treatment for Takayasu arteritis in the future, he does not need re-treatment for LTBI, although, given the incomplete documentation and to be overly cautious, monitoring for any  evidence of recrudescent TB would be reasonable, just in case.         Interval History:   Case discussed further with the [place] [address] Medicine team today.  Mr. Wolfe remains consistently afebrile (T max 98.4 degrees F) since admission, with an unchanged normal peripheral WBC of 6.0 today, on no antimicrobials.  He has no complaints except wanting to be discharged, including no chills, sweats, EENT symptoms, cough, dyspnea on room air, chest pain, nausea, abdominal pain, diarrhea, rash, headache, arthrlgias, or other new complaints.  A discussion with his sister reportedly leaned that the both of them were treated at a [facility] with six months of therapy for LTBI upon intake as new immigrants when he was age 11, although the name of the clinic is uncertain (not on Epic) so definitive documentation is unavailable.  Rheumatology plans to re-scan his aorta on an outpatient basis in 4 - 6 weeks to follow-up the possible diagnosis of Takasuyu aortitis, but does not plan immediate treatment at this time.  Blood cultures from 3/14/22 x 2 and 3/15/22 x 2 are without growth to date.  A 3/14/22 Fungitell assay was weakly positive at 93 -- not enough to connote clinical significance.  Other infection studies are still pending:  AFB blood cultures, ASO titer, Mycoplasma IgG and IgM antibodies, Coxiella IgG.  3/16/22 Chest MRA showed no evidence of vasculitis.         Review of Systems:   See Interval History        Antimicrobials:   None         Physical Exam:   Ranges for vital signs over the past 24 hours: Temp:  [97.5  F (36.4  C)-97.9  F (36.6  C)] 97.6  F (36.4  C)  Pulse:  [69-77] 76  Resp:  [16-18] 16  BP: (102-127)/(66-84) 102/66  SpO2:  [100 %] 100 %  No intake or output data in the 24 hours ending 03/17/22 1350    Exam:  GENERAL:  A pleasant, conversant, WDWN 27 yo man in NAD.  HEAD:  NCAT.   ENT:  No otorrhea.  No anterior oral lesion.  EYES:  EOMI, anicteric sclerae.  NECK:  Supple.   LUNGS:   Clear to auscultation bilaterally.  CARDIOVASCULAR:  RRR, no murmur.  ABDOMEN:  Normal bowel sounds, soft, completely nontender.  EXT: Extremities warm and without edema.  SKIN:  No acute rash.   NEUROLOGIC:  Alert, oriented, moves extremities x 4.         Laboratory Data:     Creatinine   Date Value Ref Range Status   03/17/2022 0.77 0.66 - 1.25 mg/dL Final   03/13/2022 0.71 0.66 - 1.25 mg/dL Final   03/12/2022 0.83 0.66 - 1.25 mg/dL Final   03/06/2021 0.87 0.66 - 1.25 mg/dL Final     Creatinine POCT   Date Value Ref Range Status   03/12/2022 1.0 0.7 - 1.3 mg/dL Final     WBC Count   Date Value Ref Range Status   03/17/2022 6.0 4.0 - 11.0 10e3/uL Final   03/12/2022 5.5 4.0 - 11.0 10e3/uL Final     Hemoglobin   Date Value Ref Range Status   03/17/2022 15.0 13.3 - 17.7 g/dL Final   03/06/2021 15.7 13.3 - 17.7 g/dL Final     Platelet Count   Date Value Ref Range Status   03/17/2022 215 150 - 450 10e3/uL Final     Erythrocyte Sedimentation Rate   Date Value Ref Range Status   03/12/2022 8 0 - 15 mm/hr Final     CRP Inflammation   Date Value Ref Range Status   03/12/2022 6.3 0.0 - 8.0 mg/L Final     AST   Date Value Ref Range Status   03/17/2022 16 0 - 45 U/L Final   03/12/2022 18 0 - 45 U/L Final     ALT   Date Value Ref Range Status   03/17/2022 19 0 - 70 U/L Final   03/12/2022 19 0 - 70 U/L Final     Bilirubin Total   Date Value Ref Range Status   03/17/2022 1.0 0.2 - 1.3 mg/dL Final   03/12/2022 0.5 0.2 - 1.3 mg/dL Final     Lab Results   Component Value Date     03/17/2022    BUN 11 03/17/2022    CO2 28 03/17/2022     Inflammatory Markers  Recent Labs   Lab Test 03/12/22  0105 03/12/22  0103   SED  --  8   CRP 6.3  --      Color Urine (no units)   Date Value   03/12/2022 Yellow   11/26/2020 Yellow     Appearance Urine (no units)   Date Value   03/12/2022 Clear   11/26/2020 Clear     Glucose Urine (mg/dL)   Date Value   03/12/2022 Negative   11/26/2020 Negative     Bilirubin Urine (no units)   Date Value    03/12/2022 Negative   11/26/2020 Negative     Ketones Urine (mg/dL)   Date Value   03/12/2022 Trace (A)   11/26/2020 Negative     Specific Gravity Urine (no units)   Date Value   03/12/2022 1.027   11/26/2020 1.019     pH Urine   Date Value   03/12/2022 6.0   11/26/2020 6.5 pH     Protein Albumin Urine (mg/dL)   Date Value   03/12/2022 20  (A)   11/26/2020 Negative     Nitrite Urine (no units)   Date Value   03/12/2022 Negative   11/26/2020 Negative     Leukocyte Esterase Urine (no units)   Date Value   03/12/2022 Negative   11/26/2020 Negative          Microbiology:     Completed  - Blood cultures x2 3/12/2022, x2 3/14/2022, x2 3/15/2022 with no growth to date  - UA 3/12 mild protein, no s/o infection  - HIV (-)  - Hepatitis panel (-)  - Treponema ab (-) 3/12  - Fungitell weakly positive at 93 3/14  - Cryptococcus antigen negative  3/14  - Quantiferon Gold positive (both antigens - nil 8.66) (s/p treatment for LTBI)  - Sputum culture 3/16:  Normal georges, Moraxella catarrhalis  - ASO titer negative (64)    Pending  - Mycoplasma IgG and IgM antibodies  - Coxiella antibody IGG  - Enteric panel (not yet collected)         Imaging:     MRA Chest 3/16/22:  Impression:  Normal chest MRI. No suspicious findings for vasculitis.     Renal U/S 3/15/22:  Impression:  1. Right renal artery origin and mid segment not visualized. Left renal artery origin not visualized. No renal artery stenosis demonstrated in the visualized renal artery segments.  2. Right mid arcuate artery resistive index elevated. Other arcuate artery resistive indices normal.     TTE 3/14/21:  Impression:  Global and regional left ventricular function is normal with an EF of 60-65%.  Right ventricular function, chamber size, wall motion, and thickness are normal.  The atrial septum is intact as assessed by agitated saline bubble study.  Pulmonary artery systolic pressure cannot be assessed.  The inferior vena cava is normal.  No pericardial effusion is  present.  No significant changes noted.    Abdm MRI / MRA 3/14/22:  Impression:  Segmental wall thickening of the juxtarenal abdominal aorta causing mild luminal narrowing without associated periaortic abnormality.  There is enhancement of the wall without significant periadventitial inflammation. Consider vasculitis and other non-infectious aortitis.  Consider evaluation of large vessels in the chest to help narrow differential, if aortic arch, cerebral vessels or pulmonary arteries may be involved.     CT A/P 3/12/2022:                                        Impression:   Segmental wall thickening of the juxtarenal abdominal aorta causing  mild luminal narrowing without associated periaortic abnormality.  There is enhancement of the wall without significant periadventitial  inflammation. Consider vasculitis and other non-infectious aortitis.  Consider evaluation of large vessels in the chest to help narrow  differential, if aortic arch, cerebral vessels or pulmonary arteries  may be involved.      MRA Abdomen wo and w/ contrast 3/12/2022:  Impression:   Segmental wall thickening of the juxtarenal abdominal aorta causing  mild luminal narrowing without associated periaortic abnormality.  There is enhancement of the wall without significant periadventitial  inflammation. Consider vasculitis and other non-infectious aortitis.  Consider evaluation of large vessels in the chest to help narrow  differential, if aortic arch, cerebral vessels or pulmonary arteries  may be involved.      CXR 3/12/2022:  IMPRESSION: No radiographic evidence for hilar or mediastinal  lymphadenopathy. No acute airspace or interstitial pulmonary opacity.

## 2022-03-17 NOTE — TELEPHONE ENCOUNTER
Patient calling reporting he was discharged today [facility].    Patient stating he went to the pharmacy and was advised that the Dr called and he no longer needed to  the medication (Rifadin).  Patient is questioning why?    Placed call to Ayesha [facility] patient's discharge nurse. Ayesha advised to have patient follow up with infectious disease.    Patient verbalized understanding.    Denise Gonzalez RN  [facility]         Additional Information    Negative: Nursing judgment    Negative: Nursing judgment    Negative: Nursing judgment    Negative: Nursing judgment    Information only question and nurse able to answer    Protocols used: INFORMATION ONLY CALL - NO TRIAGE-A-OH

## 2022-03-17 NOTE — DISCHARGE SUMMARY
[facility]  Discharge Summary - Medicine & Pediatrics       Date of Admission:  3/12/2022  Date of Discharge:  3/17/2022  Discharging Provider: Rojas Johnson MD  Discharge Service: Medicine Service, RASHMI TEAM 2    Discharge Diagnoses   Aortitis   Latent TB  Partial IgA Deficiency     Follow-ups Needed After Discharge    - Infectious disease within 2-3 weeks    - MRA abd in 6 weeks    - Rheumatology within 6-8 weeks     Unresulted Labs Ordered in the Past 30 Days of this Admission       Date and Time Order Name Status Description    3/16/2022  6:42 PM Blood Culture Special Organism Preliminary     3/16/2022  6:15 PM Blood Culture Special Organism Preliminary     3/16/2022 10:43 AM IgG In process     3/16/2022 10:43 AM Immunoglobulin G subclasses In process     3/16/2022 12:02 AM Acid-fast Bacilli (AFB) Blood Culture Preliminary     3/15/2022 12:02 AM Blood Culture Arm, Right Preliminary     3/15/2022 12:02 AM Blood Culture Arm, Left Preliminary     3/14/2022  4:01 PM Blood Culture Arm, Left Preliminary     3/14/2022  4:01 PM Blood Culture Peripheral Blood Preliminary     3/14/2022  4:01 PM Mycoplasma pneumonia abys IgG and IgM In process     3/14/2022  4:01 PM Coxiella B Antibody IGG Phase 1 and 2 with Reflex In process     3/14/2022  7:28 AM Respiratory Aerobic Bacterial Culture with Gram Stain Preliminary     3/12/2022 11:51 AM Blood Culture Hand, Right Preliminary     3/12/2022 11:51 AM Blood Culture Arm, Left Preliminary         These results will be followed up by infectious disease and rheumatology.     Discharge Disposition   Discharged to home  Condition at discharge: Good      Hospital Course   Stephon Wolfe is a 26 year old male with a past medical history significant for prediabetes, tension headaches, back pain, hip and knee pain, and bipolar disorder admitted from the ED on 3/12/2022 for emesis and abdominal pain. 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 MRA abdomen confirmed abdominal aortitis, while MRA chest indicated normal physiology with no inflammation in the proximal aorta/subclavian/carotic arteries. Rheumatology consulted and overall work up was negative, except patient found to be partially IgA deficient. Broad ID workup: TB Quantiferon positive for latent TB, and Beta D Glucan also positive. Fungal blood culture pending, and ID will follow up outpatient with results. Suspicion for fungal infection very low, since patient blood cultures were negative. Aortitis DDx includes Takayasu arteritis vs. Aortitis secondary to TB vs. Idiopathic aortitis. ID suggesting patient be treated for latent TB, however, he reports prior treatment (around 2006). Patient and family still searching for documentation confirming TB treatment though discussed with ID who feels no need to repeat treatment and that current evidence for treatment is sufficient. Patient will follow up with ID within 2-3 weeks after discharge. Will also receive a repeat MRA abdomen in 6 weeks, and f/u with rheumatology in 6-8 weeks. Outpatient referrals have been placed.       Consultations This Hospital Stay   RHEUMATOLOGY IP CONSULT  CARDIOLOGY GENERAL ADULT IP CONSULT  INFECTIOUS DISEASE GENERAL ADULT IP CONSULT  VASCULAR ACCESS CARE ADULT IP CONSULT  VASCULAR ACCESS CARE ADULT IP CONSULT    Code Status   Full Code       The patient was discussed with Dr. Rojas Pham, MS3    MD Katelin Fry [address] [place] [address] [address] [place] [id number]  Phone: [phone]  ______________________________________________________________________    Physical Exam   Vital Signs: Temp: 97.6  F (36.4  C) Temp src: Oral BP: 102/66 Pulse: 76   Resp: 16 SpO2: 100 % O2 Device: None (Room air)    Weight: 160 lbs 0 oz    GENERAL: Patient resting comfortably. In no acute  distress.  HENT: Head atraumatic, normocephalic. Eyes without scleral icterus, EOM intact.   CARDIOVASCULAR: RRR, no murmur  RESPIRATORY: Clear to auscultation bilaterally, no wheezes, or rhonchi.  ABDOMEN: Soft, non-tender, no masses appreciated   EXTREMITIES: Warm and well perfused, no edema  SKIN: Not jaundiced, no rash, no ecchymoses.  NEURO: Awake, alert, and responding to questions appropriately. Moving all extremities spontaneously. No lateralizing symptoms or focal neurologic deficits.  PSYCH: Normal affect and thought process.      Primary Care Physician   [facility]    Discharge Orders      MRA Abdomen wo & w Contrast    Please schedule for approximately 6 weeks from hospital discharge (week of 4/25/22)     Infectious Disease Referral      Adult Rheumatology  Referral      Reason for your hospital stay    Dear Stephon Wolfe    Your were hospitalized at [facility] with aortitis, or inflammation of the aorta. We obtained multiple lab tests and further imaging. One possibility of what is causing Aortitis is something called Takayasu syndrome which would be treated with steroids. We will need to monitor the aorta with repeat imaging after you leave the hospital and have you follow up with rheumatology and infectious disease to determine if you need any treatment for this or not.     Please get the following tests done:  - Repeat MRI of the vessels in the abdomen (MRA abdomen and pelvis) within 6 weeks after discharge    Please set up an appointment with:  - Infectious disease within 2-3 weeks to determine if you need to be treated for TB   - Rheumatology within 6-8 weeks to determine if you need treatment for aortitis and to review the MRI results     It was a pleasure meeting with you today. Thank you for allowing me and my team the privilege of caring for you today. You are the reason we are here, and I truly hope we provided you with the excellent service  you deserve. Please let us know if there is anything else we can do for you so that we can be sure you are leaving completely satisfied with your care experience.    Your hospital unit at the time of discharge is 5A so if you have any questions please call the hospital at [phone] and ask to talk to a nurse on 5A.    Take care!    Department of Medicine  [facility]     Activity    Your activity upon discharge: activity as tolerated     Adult [facility] Follow-up and recommended labs and tests    Please set up an appointment with:  - Infectious disease within 2-3 weeks to determine if you need to be treated for TB   - Rheumatology within 6-8 weeks to determine if you need treatment for aortitis and to review the MRA results     Appointments on [facility] and/or [facility] (with [facility] or [facility] provider or service). Call [phone] if you haven't heard regarding these appointments within 7 days of discharge.     Follow Up and recommended labs and tests    Please get the following tests done:  - Repeat MRI of the vessels in the abdomen (MRA abdomen and pelvis) within 6 weeks after discharge, rheumatology will follow up these results with you in clinic     Diet    Follow this diet upon discharge: Orders Placed This Encounter      Regular Diet Adult       Significant Results and Procedures   ,   Results for orders placed or performed during the hospital encounter of 03/12/22   CT Abdomen Pelvis w Contrast    Addendum: 3/12/2022        Another differential for abdominal aortic wall thickening would be wall hematoma and acute aortic syndrome. No dissection.    Asymmetric plaque would not be typical in a patient of this age.          Narrative    EXAM: CT ABDOMEN PELVIS W CONTRAST  LOCATION: [facility]  DATE/TIME: 3/12/2022 1:12 AM    INDICATION: RLQ abdominal pain  COMPARISON: None.  TECHNIQUE: CT scan of the abdomen and pelvis was performed following  injection of IV contrast. Multiplanar reformats were obtained. Dose reduction techniques were used.  CONTRAST: 78mL's iso 370    FINDINGS:   LOWER CHEST: Normal.    HEPATOBILIARY: Normal.    PANCREAS: Normal.    SPLEEN: Normal.    ADRENAL GLANDS: Normal.    KIDNEYS/BLADDER: Normal.    BOWEL: Normal caliber appendix. No bowel obstruction or pneumatosis.    LYMPH NODES: Normal.    VASCULATURE: Significant wall thickening involving the abdominal aorta with adjacent inflammatory stranding, beginning at the level of the origin of the SMA, extending past the renal arteries, and resolving approximately at the level of the MORGAN origin   (approximately 7.5 cm in craniocaudal extent). There is mild thickening involving the origin of both renal arteries. Wall thickening results in between 25 and 50% luminal narrowing in the aorta. No retroperitoneal hemorrhage.    PELVIC ORGANS: No free fluid    MUSCULOSKELETAL: No acute bony abnormality.      Impression    IMPRESSION:   1.  CT findings compatible with aortitis.    Impression was called to Dr. Goltz by Dr. Morro Chapman on 3/12/2022 1:44 AM.   XR Chest 2 Views    Narrative    EXAM: XR CHEST 2 VW 3/12/2022 2:06 PM    HISTORY: assess for lymphadenopathy evidence of sarcoid.    COMPARISON: 3/12/2022 CT.    TECHNIQUE: Upright frontal and lateral views of the chest.    FINDINGS: Midline trachea. Cardiomediastinal silhouette, pulmonary  vasculature are within normal limits. No focal airspace opacities. No  pneumothorax or pleural effusion.  Gas-distended transverse colon and stomach. No dilated loops of small  bowel seen in the included upper abdomen. No acute osseous  abnormalities.      Impression    IMPRESSION: No radiographic evidence for hilar or mediastinal  lymphadenopathy. No acute airspace or interstitial pulmonary opacity.    I have personally reviewed the examination and initial interpretation  and I agree with the findings.    PETE HERRERA MD         SYSTEM ID:   [id number]   MR Abdomen w/o & w Contrast Angiogram    Narrative    MRA of the abdomen dated 3/12/2022 6:10 PM    Comparison: CT 3/12/2022    Clinical information: Follow-up possible aortitis seen on abdominal CT    Technique: Truefisp localizing images were obtained in axial and  coronal planes. Multiple rapid FLASH acquisitions were obtained after  contrast media injection in both arterial and venous phases. Source  images were reviewed as well as 3D and multi-planar reconstructions.    Findings:    Vascular:  Non-aneurysmal abdominal aorta with circumferential wall thickening at  the level of the renal arteries and in the superior aspect of the  infrarenal portion measuring up to 5 mm in thickness. There is  associated mild smooth narrowing of the aortic lumen with minimum  diameter of 9 mm at the level of L2-3. Thickened portions of the  aortic wall demonstrate diffuse enhancement. There may be minimal  extension into the superior mesenteric and renal artery origins. No  mass or appreciable edema in the periaortic soft tissues.    The celiac axis SMA, and MORGAN are patent. Single right and single left  renal arteries are patent. The right and circumaortic left renal veins  are patent. The portal vein, SMV, splenic vein, hepatic veins, iliac  veins and IVC are patent.    Remaining exam:   Visualized portions of the liver, gallbladder, pancreas, spleen,  adrenal glands, and kidneys are within normal limits. No abnormally  dilated loops of bowel, free air, free fluid, or pathologically  enlarged lymph nodes in the visualized abdomen.    No overt airspace disease in the visualized lung bases. No overt  osseous abnormality. There appear to be 6 lumbar-type vertebral bodies  with lumbarization of S1 and well-formed rudimentary S1-2 disc.      Impression    Impression:   Segmental wall thickening of the juxtarenal abdominal aorta causing  mild luminal narrowing without associated periaortic abnormality.  There is  enhancement of the wall without significant periadventitial  inflammation. Consider vasculitis and other non-infectious aortitis.  Consider evaluation of large vessels in the chest to help narrow  differential, if aortic arch, cerebral vessels or pulmonary arteries  may be involved.     I have personally reviewed the examination and initial interpretation  and I agree with the findings.    FRANC BARTH         SYSTEM ID:  [id number]   US Renal Complete w Duplex Complete    Narrative    RENAL COMPLETE WITH DUPLEX ULTRASOUND 3/15/2022 9:40 AM    CLINICAL HISTORY: Assess for renal hypertension and etiologies, would  like with arterial velocities.      COMPARISONS: MR 3/12/2022.    ORDERING PROVIDER: TAYLA HIGGINBOTHAM    TECHNIQUE: B-mode (grayscale) and duplex Doppler evaluation of the  abdominal aorta and renal arteries performed. Velocity measurements  obtained with angle correction at or less than 60 degrees.     Findings:    AORTA:       Suprarenal: 116/0 cm/s, triphasic       Infrarenal: 96/0 cm/s, biphasic    RIGHT KIDNEY:       Renal artery:            Origin: Not visualized             Mid: Not visualized             Hilum: 101/31 cm/s         Renal artery - aortic ratio: 0.87         Renal vein: 34 cm/s         Length: 11.0 cm         Cortical thickness: 1.0 cm         Arcuate artery resistive index (superior / mid / inferior): 0.68  / 0.76 / 0.63         Parenchyma: Normal. No stone, mass, or hydronephrosis.    LEFT KIDNEY:       Renal artery:            Origin: Not visualized            Mid: 81/26 cm/s            Hilum: 94/39 cm/s         Renal artery - aortic ratio: 0.81         Renal vein: 24 cm/s         Length: 10.6 cm         Cortical thickness: 1.4 cm         Arcuate artery resistive index (superior / mid / inferior): 0.61  / 0.67 / 0.57         Parenchyma: Normal. No stone, mass, or hydronephrosis.    Bladder: Not visualized.      Impression    IMPRESSION:   1. Right renal artery origin and mid  segment not visualized. Left  renal artery origin not visualized. No renal artery stenosis  demonstrated in the visualized renal artery segments.    2. Right mid arcuate artery resistive index elevated. Other arcuate  artery resistive indices normal.    Guidelines:  Diagnostic criteria suggestive of > 60% diameter stenosis  Renal artery:       Peak systolic velocity > 180 cm/s       RAR > 3.5       Turbulent flow    Arcuate artery Resistive Index Normal < 0.7    I have personally reviewed the examination and initial interpretation  and I agree with the findings.    HERMAN SUAREZ MD         SYSTEM ID:  [id number]   MRA Chest wo & w Contrast    Narrative    MRA chest dated 3/16/2022 3:13 PM    CLINICAL INFORMATION: Aortic disease, nontraumatic; Pt with finding of  abdominal aortitis on CT, confirmed with MRA abdomen, concerned for  Takayasu's Arteritis. Please include as much subclavian and carotid  imaging as possible.    TECHNIQUE: Multiple acquisitions were obtained after contrast media  injection in both arterial and venous phases. Source images were  reviewed as well as 3D and multi-planar reconstructions at a 3D work  station.    COMPARISON: CT abdomen 3/12/22.    FINDINGS:      Thoracic aorta is normal in size. Ascending aorta on this non-gated  study measures approximately 2.7 cm and descending aorta measures  approximately 1.8 cm diameter. No wall thickening or hyperenhancement.  There is normal branching pattern of the great vessels. Origins of the  brachiocephalic artery, left common carotid artery and left subclavian  artery show no focal abnormality. The heart is normal in size. No  pericardial effusion.    The pulmonary arteries: Patent and unremarkable.    No abnormal hilar, mediastinal or axillary lymphadenopathy is seen.   No focal lung parenchymal abnormalities are identified.     Partially visualized abdomen is unremarkable.    Visualized bones are unremarkable.      Impression    IMPRESSION:    Normal  chest MRI. No suspicious findings for vasculitis.    I have personally reviewed the examination and initial interpretation  and I agree with the findings.    JOSE ALFREDO REYEZ MD         SYSTEM ID:  [id number]   Echo Complete     Value    LVEF  60-65%    Narrative    [id number]  [facility]  [id number]  [id number]^ANAHY^TAYLA     [facility]  Echocardiography Laboratory  [address] [place] [address]     Name: KATHY WAGNER  MRN: [id number]  : 1995  Study Date: 2022 03:40 PM  Age: 26 yrs  Gender: Male  Patient Location: [facility]  Reason For Study: Aortitis, R/O Takayasu  Ordering Physician: TAYLA HIGGINBOTHAM  Performed By: Nicole Mojica     BSA: 1.9 m2  Height: 70 in  Weight: 160 lb  ______________________________________________________________________________  Procedure  Bubble Echocardiogram with two-dimensional, color and spectral Doppler  performed.  ______________________________________________________________________________  Interpretation Summary  Global and regional left ventricular function is normal with an EF of 60-65%.  Right ventricular function, chamber size, wall motion, and thickness are  normal.  The atrial septum is intact as assessed by agitated saline bubble study .  Pulmonary artery systolic pressure cannot be assessed.  The inferior vena cava is normal.  No pericardial effusion is present.  No significant changes noted.  ______________________________________________________________________________  Left Ventricle  Global and regional left ventricular function is normal with an EF of 60-65%.  Left ventricular wall thickness is normal. Left ventricular size is normal.  Left ventricular diastolic function is normal. No regional wall motion  abnormalities are seen.     Right Ventricle  Right ventricular function, chamber size, wall motion, and thickness are  normal.     Atria  Both atria appear normal. The atrial septum is intact as assessed by  agitated  saline bubble study .     Mitral Valve  The mitral valve is normal.     Aortic Valve  Aortic valve is normal in structure and function.     Tricuspid Valve  The tricuspid valve is normal. Pulmonary artery systolic pressure cannot be  assessed.     Pulmonic Valve  The pulmonic valve is normal. Trace pulmonic insufficiency is present.     Vessels  The thoracic aorta is normal. The pulmonary artery and bifurcation cannot be  assessed. The inferior vena cava is normal.     Pericardium  No pericardial effusion is present.     Compared to Previous Study  No significant changes noted.  ______________________________________________________________________________  MMode/2D Measurements & Calculations  IVSd: 0.78 cm  LVIDd: 4.7 cm  LVIDs: 2.7 cm  LVPWd: 0.97 cm  FS: 41.3 %  LV mass(C)d: 134.9 grams  LV mass(C)dI: 71.1 grams/m2  Ao root diam: 2.5 cm  asc Aorta Diam: 2.6 cm  LVOT diam: 2.1 cm  LVOT area: 3.5 cm2  LA Volume (BP): 38.0 ml     LA Volume Index (BP): 20.0 ml/m2  RWT: 0.42     Doppler Measurements & Calculations  MV E max koffi: 78.6 cm/sec  MV A max koffi: 57.2 cm/sec  MV E/A: 1.4  MV dec slope: 344.0 cm/sec2  E/E' av.7  Lateral E/e': 5.0  Medial E/e': 8.3     ______________________________________________________________________________  Report approved by: Vazquez Watkins 2022 04:27 PM               Discharge Medications   Current Discharge Medication List        START taking these medications    Details   rifampin (RIFADIN) 300 MG capsule Take 2 capsules (600 mg) by mouth daily  Qty: 240 capsule, Refills: 0    Associated Diagnoses: TB lung, latent           CONTINUE these medications which have NOT CHANGED    Details   acetaminophen (TYLENOL) 325 MG tablet Take 325-650 mg by mouth every 6 hours as needed for mild pain      diphenhydrAMINE (BENADRYL) 25 MG tablet Take 1 tablet (25 mg) by mouth every 6 hours as needed for itching  Qty: 56 tablet, Refills: 0      ergocalciferol (ERGOCALCIFEROL)  1.25 MG (45234 UT) capsule daily.      ibuprofen (ADVIL/MOTRIN) 600 MG tablet Take 600 mg by mouth every 6 hours as needed for moderate pain      traZODone (DESYREL) 100 MG tablet Take 1 tablet (100 mg) by mouth At Bedtime  Qty: 30 tablet, Refills: 0           Allergies   No Known Allergies

## 2022-03-17 NOTE — PLAN OF CARE
Goal Outcome Evaluation:           Pt has been alert and oriented X4, VSS on RA, afebrile, tolerated regular diet well, independent on ft, denied any pain. Pt signed release of med record for this facility to obtain medical record from [facility] re past TB treatment.  Medicine team put in order to send pt home today with discharge med and follow up with ID. Discharge instruction given. Pt is to ambulate discharge pharmacy and to front lobby to meet ride.

## 2022-03-18 ENCOUNTER — PATIENT OUTREACH (OUTPATIENT)
Dept: CARE COORDINATION | Facility: CLINIC | Age: 27
End: 2022-03-18
Payer: COMMERCIAL

## 2022-03-18 DIAGNOSIS — Z71.89 OTHER SPECIFIED COUNSELING: ICD-10-CM

## 2022-03-18 LAB
IGG SERPL-MCNC: 1445 MG/DL (ref 610–1616)
IGG SERPL-MCNC: 1445 MG/DL (ref 610–1616)
IGG1 SER-MCNC: 978 MG/DL (ref 382–929)
IGG2 SER-MCNC: 254 MG/DL (ref 242–700)
IGG3 SER-MCNC: 107 MG/DL (ref 22–176)
IGG4 SER-MCNC: 30 MG/DL (ref 4–86)
M PNEUMO IGG SER IA-ACNC: 0.26 U/L
M PNEUMO IGM SER IA-ACNC: 0.05 U/L
SUBCLASSES, PERCENT: 95 %

## 2022-03-18 NOTE — PROGRESS NOTES
Clinic Care Coordination Contact  [facility]: Post-Discharge Note  SITUATION                                                      Admission:    Admission Date: 03/12/22   Reason for Admission: Aortitis  Discharge:   Discharge Date: 03/17/22  Discharge Diagnosis: Aortitis    BACKGROUND                                                      Per hospital discharge summary and inpatient provider notes:      Stephon Wolfe is a 26 year old male with a past medical history significant for prediabetes, tension headaches, back pain, hip and knee pain, and bipolar disorder admitted from the ED on 3/12/2022 for emesis and abdominal pain. 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 MRA abdomen confirmed abdominal aortitis, while MRA chest indicated normal physiology with no inflammation in the proximal aorta/subclavian/carotic arteries. Rheumatology consulted and overall work up was negative, except patient found to be partially IgA deficient. Broad ID workup: TB Quantiferon positive for latent TB, and Beta D Glucan also positive. Fungal blood culture pending, and ID will follow up outpatient with results. Suspicion for fungal infection very low, since patient blood cultures were negative. Aortitis DDx includes Takayasu arteritis vs. Aortitis secondary to TB vs. Idiopathic aortitis. ID suggesting patient be treated for latent TB, however, he reports prior treatment (around 2006). Patient and family still searching for documentation confirming TB treatment. In the meantime, patient will need to be treated for TB. ID recommended Rifampin 600mg daily for 4 months. Patient will follow up with ID within 2 weeks. Will also receive a repeat MRA abdomen in 6 weeks, and f/u with rheumatology in 6-8 weeks. Outpatient referrals have been placed.        ASSESSMENT      Enrollment  Primary Care Care Coordination Status: Not a Candidate    Discharge  Assessment  How are you doing now that you are home?: Patient reports he is feeling well but does not understand why the doctor cancelled the medication he was prescribed.  Pt has already called and spoke with the RN who refered him back to Infection Disease provider.  How are your symptoms? (Red Flag symptoms escalate to triage hotline per guidelines): Improved  Do you feel your condition is stable enough to be safe at home until your provider visit?: Yes  Does the patient have their discharge instructions? : Yes  Does the patient have questions regarding their discharge instructions? : No  Were you started on any new medications or were there changes to any of your previous medications? : Yes  Does the patient have all of their medications?: No (see comment)  Do you have questions regarding any of your medications? : Yes (see comment)  Discharge follow-up appointment scheduled within 14 calendar days? : No  Is patient agreeable to assistance with scheduling? : No (advised pt to f/u sary pcp)                  PLAN                                                      Outpatient Plan:      - Infectious disease within 2-3 weeks    - MRA abd in 6 weeks    - Rheumatology within 6-8 weeks        Future Appointments   Date Time Provider Department Center   4/25/2022  3:15 PM [address]   5/27/2022  8:45 AM Omid Taveras MD [facility]   6/29/2022  4:00 PM Shayan Sahu MD [facility]         For any urgent concerns, please contact our 24 hour nurse triage line: [phone] ([phone])         ANMOL Viveros  [phone]  [facility]

## 2022-03-19 LAB
BACTERIA BLD CULT: NO GROWTH
BACTERIA BLD CULT: NO GROWTH
BACTERIA SPT CULT: ABNORMAL
BACTERIA SPT CULT: ABNORMAL
C BURNET PH1 IGG SER QL IF: NEGATIVE
C BURNET PH2 IGG SER QL IF: POSITIVE
C BURNET PH2 IGG TITR SER IF: NORMAL {TITER}
GRAM STAIN RESULT: ABNORMAL

## 2022-03-20 LAB
BACTERIA BLD CULT: NO GROWTH
BACTERIA BLD CULT: NO GROWTH

## 2022-03-30 LAB
BACTERIA BLD CULT: NO GROWTH
BACTERIA BLD CULT: NO GROWTH

## 2022-04-25 ENCOUNTER — HOSPITAL ENCOUNTER (OUTPATIENT)
Dept: MRI IMAGING | Facility: CLINIC | Age: 27
Discharge: HOME | End: 2022-04-25
Attending: INTERNAL MEDICINE | Admitting: INTERNAL MEDICINE
Payer: COMMERCIAL

## 2022-04-25 DIAGNOSIS — I77.6 AORTITIS (H): ICD-10-CM

## 2022-04-25 PROCEDURE — A9585 GADOBUTROL INJECTION: HCPCS | Performed by: INTERNAL MEDICINE

## 2022-04-25 PROCEDURE — 255N000002 HC RX 255 OP 636: Performed by: INTERNAL MEDICINE

## 2022-04-25 PROCEDURE — 74185 MRA ABD W OR W/O CNTRST: CPT

## 2022-04-25 PROCEDURE — 74185 MRA ABD W OR W/O CNTRST: CPT | Mod: 26 | Performed by: RADIOLOGY

## 2022-04-25 RX ORDER — GADOBUTROL 604.72 MG/ML
7.5 INJECTION INTRAVENOUS ONCE
Status: COMPLETED | OUTPATIENT
Start: 2022-04-25 | End: 2022-04-25

## 2022-04-25 RX ORDER — GADOBUTROL 604.72 MG/ML
7.5 INJECTION INTRAVENOUS ONCE
Status: DISCONTINUED | OUTPATIENT
Start: 2022-04-25 | End: 2022-04-26 | Stop reason: HOSPADM

## 2022-04-25 RX ADMIN — GADOBUTROL 7.5 ML: 604.72 INJECTION INTRAVENOUS at 15:48

## 2022-04-27 LAB — BACTERIA BLD CULT: NO GROWTH

## 2022-05-02 ENCOUNTER — TELEPHONE (OUTPATIENT)
Dept: RHEUMATOLOGY | Facility: CLINIC | Age: 27
End: 2022-05-02

## 2022-05-02 NOTE — TELEPHONE ENCOUNTER
Patient's request to reschedule rheumatology appointment prior to leaving the country on 5/25/22 routed to Dr. Taveras for review.   Deandra Harrington RN  [facility]

## 2022-05-02 NOTE — TELEPHONE ENCOUNTER
[facility] Call Center    Phone Message    May a detailed message be left on voicemail: no     Reason for Call: Other: Patient is scheduled with Dr. Taveras 5/27 he will be out of the country. This date time was per Dr. Taveras. Please advise of a new date and time. He will be out of the country starting 5/25/2022.      Action Taken: Message routed to:  [facility] Rheum    Travel Screening: Not Applicable

## 2022-05-03 NOTE — TELEPHONE ENCOUNTER
Can patient come to [facility] on 5-17 at 0830 for folllowup ?

## 2022-05-04 NOTE — TELEPHONE ENCOUNTER
Placed call to patient with [place]  [place] ([id number]) to offer an appointment with Dr. Taveras on 5/17/21 at 8:30 am after receiving call stating patient is unable to make appointment currently scheduled on 5/25/22. Detailed message left with appointment information and request for call back to clinic to confirm appointment.   Deandra Harrington RN  Adult Rheumatology Clinic

## 2022-05-05 ENCOUNTER — APPOINTMENT (OUTPATIENT)
Dept: INTERPRETER SERVICES | Facility: CLINIC | Age: 27
End: 2022-05-05
Payer: COMMERCIAL

## 2022-05-05 NOTE — TELEPHONE ENCOUNTER
LMTCB via  to reschedule appointment with Dr. Taveras; per Deandra message below: offer appointment with Dr. Taveras for 5/17/22 @ 38:30am.

## 2022-05-09 NOTE — TELEPHONE ENCOUNTER
Patient called back for the appointment on 5/17 @ 8:30 but another patient is already scheduled in that time slot. Please advise

## 2022-05-16 NOTE — PROGRESS NOTES
Rheumatology Clinic Visit  [facility]  Omid Taveras M.D.     Stephon Wolfe MRN# [id number]   YOB: 1995 Age: 26 year old   Date of Visit: 05/17/2022  Primary care provider: [facility]          Assessment and Plan:     # Thickened abdominal aorta:  # History of bipolar disorder  # History of acute gastroenteritis/dysentery  # History of latent TB, sufficiently treated by report  # IgA deficiency, mild:    Data: Laboratory evaluation on March 16, 2022 showed slightly elevated IgG 1 of 978; other immunoglobulin subclasses were normal.  IgA was low at 45.  ANCA was negative.  Comprehensive metabolic panel was normal, as was CBC.  Total urine protein was 0; D-dimer was negative.  Hepatitis B surface antibody was positive; surface antigen negative.  Hepatitis C, HIV, and SARS-CoV-2 were negative.  CRP was normal.    Imaging: MRA abdomen April 25, 2022 showed enhancing (mild, endothelial enhancement on T2 weighted scans) aortic wall thickening from the renal origins to the inferior mesenteric artery.  No periaortic fat stranding, T2 signal, or enhancement.  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    We reviewed the following impression and plan:    1.  Aortic wall thickening, noted on CT scan in March 2022, and MRA abdomen in April 2022.  There is no change in the extent of aortic wall thickening between the 2 imaging studies, and there are no findings to suggest active inflammation outside the aorta.  Importantly, there are no symptoms or physical exam findings that suggest the presence of active systemic inflammatory disease such as vasculitis, malignancy, or chronic infection like TB.  Most likely explanation for the imaging abnormality is old/healed aortic inflammation, that may have occurred following long-ago infection.  No treatment for infection, and no treatment for inflammation is  "warranted at this time.  I recommend monitoring for development of systemic inflammatory disease symptoms (fever, weight loss, abdominal pain, lower extremity pain with exertion), and assessment of CRP and sedimentation rate today to confirm absence of significant blood-borne inflammation.  I also recommend repeating MR with angiography of the abdomen in 6 months time to assess both modest external iliac vein reduced diameter, and for size and character of the aortic narrowing.  There is no need for restriction on physical activity, diet, or travel at this time.    2.  History of tuberculosis, latent, treated in 2006 per patient report, positive QuantiFERON gold test March 2022.  Most likely, persistently positive tuberculosis test in March 2022 is due to immune system reactivity developed years ago after TB exposure.  There is no evidence of active TB now.  In order to be sure that TB was adequately treated back in 2006, I recommend review of records from that treatment.    Plan:  1.  Repeat imaging (MRI) of the aorta in 6 months.  2.  Resume normal activities with regular physical exercise 35 to 45 minutes 3 to 4 days/week.  3.  Report recurrence of abdominal pain, or of leg/thigh pain that occurs with exercise.  4.  Obtain records from [facility] that will confirm that treatment for latent or hidden TB was adequate in 2006.  5.  CRP/ESR today.  6.  No treatment needed for aorta abnormality today.    RTC 6 mos  Omid Taveras MD  Staff Rheumatologist, [facility]           History of Present Illness:   Stephon Wolfe presents for follow-up of abnormal aorta radiographic findings.    #History, May 17, 2022, post hospital discharge.  Patient was encountered by rheumatology in the emergency room in mid March 2022.  Impression at that time was of an acute episode of gastroenteritis/dysentery, and a coincidental abdominal CT finding of aortic wall thickening:    \"...Syndrome of acute " "gastroenteritis/dysentery is associated with segmental abdominal aortic wall thickening, but no correlates of systemic rheumatic disease, toxin, infection, or even of systemic inflammation are present. Healed/old aortic inflammatory disease is highest on the differential diagnosis of the aortic abnormalities seen on CT scan. MRI with contrast will be more sensitive to inflammatory changes in the aortic endothelium. Therapy may be withheld pending laboratory workup for infection, IgG4-related disease, and while awaiting followup imaging.\"    Patient was hospitalized for observation for 5 days and discharged on March 17, 2022.  Discharge summary listed following benign hospital course:    \" ...26 year old male with a past medical history significant for prediabetes, tension headaches, back pain, hip and knee pain, and bipolar disorder admitted from the ED on 3/12/2022 for emesis and abdominal pain. 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 MRA abdomen confirmed abdominal aortitis, while MRA chest indicated normal physiology with no inflammation in the proximal aorta/subclavian/carotic arteries. Rheumatology consulted and overall work up was negative, except patient found to be partially IgA deficient. Broad ID workup: TB Quantiferon positive for latent TB, and Beta D Glucan also positive. Fungal blood culture pending, and ID will follow up outpatient with results. Suspicion for fungal infection very low, since patient blood cultures were negative. Aortitis DDx includes Takayasu arteritis vs. Aortitis secondary to TB vs. Idiopathic aortitis. ID suggesting patient be treated for latent TB, however, he reports prior treatment (around 2006). Patient and family still searching for documentation confirming TB treatment though discussed with ID who feels no need to repeat treatment and that current evidence for treatment is sufficient...\"    Visit " conducted through an .    Today, the patient states that he is feeling well.  He has had no recurrence of the abdominal pain that brought him into the emergency room in March.  He has no fevers chills sweats weight loss, malaise abdominal pain, change in bowel habits, skin rash, joint pain, numbness weakness or tingling in arms or legs, cough, shortness of breath, or back pain.    He has not been engaging in vigorous physical exercise, because he understood that he should restrict physical activity while awaiting follow-up of the aortic abnormality.  He reports today that he is eager to resume playing soccer, and wants to know that it will be safe for him to undertake a planned 3-month trip to [facility] starting in late May 2022.           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  No past surgical history on file.         Social History:     Social History     Socioeconomic History     Marital status: Single     Spouse name: Not on file     Number of children: Not on file     Years of education: Not on file     Highest education level: Not on file   Occupational History     Not on file   Tobacco Use     Smoking status: Never Smoker     Smokeless tobacco: Never Used   Substance and Sexual Activity     Alcohol use: No     Drug use: No     Sexual activity: Not on file   Other Topics Concern     Not on file   Social History Narrative     Not on file      Social Determinants of Health     Financial Resource Strain: Not on file   Food Insecurity: Not on file   Transportation Needs: Not on file   Physical Activity: Not on file   Stress: Not on file   Social Connections: Not on file   Intimate Partner Violence: Not on file   Housing Stability: Not on file          Family History:   No family history on file.         Allergies:   No Known Allergies         Medications:     Current Outpatient Medications   Medication Sig Dispense Refill     acetaminophen (TYLENOL) 325 MG tablet Take 325-650 mg by mouth every 6 hours as needed for mild pain       cholecalciferol 125 MCG (5000 UT) CAPS        diphenhydrAMINE (BENADRYL) 25 MG tablet Take 1 tablet (25 mg) by mouth every 6 hours as needed for itching 56 tablet 0     ergocalciferol (ERGOCALCIFEROL) 1.25 MG (68987 UT) capsule daily.       ibuprofen (ADVIL/MOTRIN) 600 MG tablet Take 600 mg by mouth every 6 hours as needed for moderate pain       traZODone (DESYREL) 100 MG tablet Take 1 tablet (100 mg) by mouth At Bedtime 30 tablet 0            Physical Exam:   Blood pressure 119/84, pulse 63, temperature 97.5  F (36.4  C), resp. rate 16, weight 72.6 kg (160 lb), SpO2 98 %.  Wt Readings from Last 6 Encounters:   05/17/22 72.6 kg (160 lb)   03/12/22 72.6 kg (160 lb)   03/01/22 74.7 kg (164 lb 9.6 oz)   06/13/21 66.7 kg (147 lb)   02/18/21 68 kg (150 lb)   11/26/20 66.4 kg (146 lb 6.4 oz)     Constitutional: well-developed, appearing stated age; cooperative  Eyes: nl EOM, PERRLA, vision, conjunctiva, sclera  ENT: nl external ears, nose, hearing, lips, teeth, gums, throat  No mucous membrane lesions, normal saliva pool  Neck: no mass or thyroid enlargement  Resp: lungs clear to auscultation, nl to palpation  GI: no ABD mass or tenderness, no HSM, no pulsatile mass palpable in the midline.  Dorsalis pedis pulses symmetrical and full.  Lymph: no cervical, supraclavicular, inguinal or epitrochlear nodes  MS: Normal range of motion  in all upper extremity and lower extremity joints.  Skin: no nail pitting, alopecia, rash, nodules or lesions  Neuro: nl cranial nerves, strength, sensation, DTRs.   Psych: nl judgement, orientation, memory, affect.         Data:     @  RHEUM RESULTS Latest Ref Rng & Units 3/6/2021 3/12/2022 3/12/2022   ALBUMIN 3.4 - 5.0 g/dL - 4.1 -   ALT 0 - 70 U/L - 19 -   AST 0 - 45 U/L - 18 -   ALEXANDER INTERPRETATION Negative - - -   CREATININE 0.66 - 1.25 mg/dL 0.87 0.83 -   CRP 0.0 - 8.0 mg/L - - 6.3   GFR ESTIMATE, IF BLACK >60 mL/min/[1.73:m2] >90 - -   GFR ESTIMATE >60 mL/min/1.73m2 >90 >90 -   HEMATOCRIT 40.0 - 53.0 % - 45.7 -   HEMOGLOBIN 13.3 - 17.7 g/dL 15.7 15.5 -   HEPBANG Nonreactive - - -   HCVAB Nonreactive - - -   IGA 84 - 499 mg/dL - - -   IGM 35 - 242 mg/dL - - -   -1,616 mg/dL - - -   WBC 4.0 - 11.0 10e3/uL - 5.5 -   RBC 4.40 - 5.90 10e6/uL - 5.24 -   RDW 10.0 - 15.0 % - 12.4 -   MCHC 31.5 - 36.5 g/dL - 33.9 -   MCV 78 - 100 fL - 87 -   PLATELET COUNT 150 - 450 10e3/uL - 244 -   ESR 0 - 15 mm/hr - 8 -   QUANTIFERON-TB GOLD PLUS RESULT Negative - - -        ,  ,  ,  ,  ,  ,   ALEXANDER interpretation   Date Value Ref Range Status   03/12/2022 Negative Negative Final     Comment:       Negative:              <1:40  Borderline Positive:   1:40 - 1:80  Positive:              >1:80   ,  ,  ,  ,  ,  ,  ,  ,  ,   Hepatitis B Surface Antigen   Date Value Ref Range Status   03/12/2022 Nonreactive Nonreactive Final   ,  ,  ,  ,   Quantiferon-TB Gold Plus   Date Value Ref Range Status   03/13/2022 Positive (A) Negative Final     Comment:     Interferon gamma response to M.tuberculosis antigens was detected,suggesting infection with M.tuberculosis. Positive results in patients at low risk for infection should be interpreted with caution and repeat testing on a new sample should be considered as recommended by the 2017 ATS/IDSA/CDC Clinical Prac  terry Guidelines for Diagnosis of Tuberculosis in Adults and Children      TB1 Ag  minus Nil Value   Date Value Ref Range Status   03/13/2022 8.66 IU/mL Final     TB2 Ag minus Nil Value   Date Value Ref Range Status   03/13/2022 8.66 IU/mL Final     Mitogen minus Nil Result   Date Value Ref Range Status   03/13/2022 8.66 IU/mL Final     Nil Result   Date Value Ref Range Status   03/13/2022 1.34 IU/mL Final   ,  ,  ,  ,  ,  ,  ,  ,  ,  ,   Neutrophil Cytoplasmic Antibody   Date Value Ref Range Status   03/12/2022 <1:10 <1:10 Final   ,   Neutrophil Cytoplasmic Antibody Pattern   Date Value Ref Range Status   03/12/2022   Final    The ANCA IFA is <1:10.  No further testing will be performed.   ,  ,  ,  ,  ,  ,  ,   Immunoglobulin G   Date Value Ref Range Status   03/16/2022 1,445 610-1,616 mg/dL Final   03/16/2022 1,445 610-1,616 mg/dL Final     Immunoglobulin A   Date Value Ref Range Status   03/12/2022 45 (L) 84 - 499 mg/dL Final     Immunoglobulin M   Date Value Ref Range Status   03/12/2022 77 35 - 242 mg/dL Final     Narrative & Impression   MRA ABDOMEN WITH 3D AND MULTIPLANAR RECONSTRUCTIONS 4/25/2022 4:50 PM     CLINICAL HISTORY: Aortitis (H).      COMPARISONS: CT 3/12/2022     REFERRING PROVIDER: IRAIS NEAL     TECHNIQUE: Coronal T2 HASTE, TRUFISP and T1 3D VIBE PRE and axial  TRUFISP, T1 3D VIBE PRE, and 2DTOF sequences obtained. MIP  reconstructions made from the 2DTOF. Coronal FL3D angio sequences  obtained. Post contrast coronal and axial T1 3D VIBE sequences  obtained. Subtraction reconstructions produced.     3D and multiplanar reconstructions were produced.     FINDINGS: Aortic wall thickening again demonstrated from the level of  the renal arteries to the inferior mesenteric artery. Wall T2 signal  and enhancement. No periaortic fat stranding or T2 signal.     No aortic stenosis or dissection.     Aorta:       Celiac origin: 15 mm       Infrarenal (including thickened wall): 18 mm       Inferior mesenteric origin: 12 mm       Above the bifurcation: 14 mm     Celiac, superior  mesenteric, bilateral single renal, and inferior  mesenteric arteries are patent.     Circumaortic left renal artery retroaortic branch crosses behind the  aorta just above the level of the inferior mesenteric artery.     Right arterial and venous and left venous corona mortis were  demonstrated in the previous CT but not included in this study.     Left common iliac vein measures 2.7 mm in diameter between the right  common iliac artery and spine and 7.7 mm laterally for a 65% diameter  stenosis.                                                                      IMPRESSION:  1. Aortitis again demonstrated with enhancing wall thickening from the  renal origins to the inferior mesenteric artery. No periaortic fat  stranding, T2 signal, or enhancement.     2. 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 No thrombus demonstrated.     Reference:  Crispin L, Armandon U, Ulkrissyn S, Korene Z, Petty F. Compression of the  left common iliac vein in asymptomatic subjects and patients with left  iliofemoral deep vein thrombosis. J Vasc Interv Radiol. 2008  Mar;19(3):366-70; quiz 371. doi: 10.1016/j.jvir.2007.09.007. PMID:  76517912.     Compression in excess of 70% can be helpful for identifying possible  underlying May Thurner Syndrome (iliac vein compression syndrome -  IVCS) in patients with a left lower extremity deep vein thrombosis.     HERMAN SUAREZ MD

## 2022-05-17 ENCOUNTER — OFFICE VISIT (OUTPATIENT)
Dept: RHEUMATOLOGY | Facility: CLINIC | Age: 27
End: 2022-05-17
Attending: INTERNAL MEDICINE
Payer: COMMERCIAL

## 2022-05-17 VITALS
SYSTOLIC BLOOD PRESSURE: 119 MMHG | TEMPERATURE: 97.5 F | BODY MASS INDEX: 22.83 KG/M2 | DIASTOLIC BLOOD PRESSURE: 84 MMHG | RESPIRATION RATE: 16 BRPM | OXYGEN SATURATION: 98 % | WEIGHT: 160 LBS | HEART RATE: 63 BPM

## 2022-05-17 DIAGNOSIS — I77.6 AORTITIS (H): ICD-10-CM

## 2022-05-17 PROCEDURE — 99214 OFFICE O/P EST MOD 30 MIN: CPT | Performed by: INTERNAL MEDICINE

## 2022-05-17 PROCEDURE — G0463 HOSPITAL OUTPT CLINIC VISIT: HCPCS

## 2022-05-17 ASSESSMENT — PAIN SCALES - GENERAL: PAINLEVEL: NO PAIN (0)

## 2022-05-17 NOTE — PATIENT INSTRUCTIONS
Diagnosis:  1.  Aortic wall thickening, noted on CT scan in March 2022, and MR abdomen in April 2022.  There is no change in the extent of aortic thickening between the 2 imaging studies, and there are no findings to suggest active inflammation outside the aorta.  Most likely explanation for the abnormality is old/healed aortic inflammation, that may have occurred following previous infection.  No treatment for infection, and no treatment for inflammation is warranted at this time.  2.  History of tuberculosis, latent, treated.  Most likely, persistently positive tuberculosis test in March 2022 is due to immune system reactivity to old TB.  There is no evidence of active TB now.  In order to be sure that TB was adequately treated back in 2006, I recommend review of records from that treatment.    Plan:  1.  Repeat imaging (MRI) of the aorta in 6 months.  2.  Resume normal activities with regular physical exercise 35 to 45 minutes 3 to 4 days/week.  3.  Report recurrence of abdominal pain, or of leg/thigh pain that occurs with exercise.  4.  Obtain records from [facility] that will confirm that treatment for latent or hidden TB was adequate in 2006.  5. Bloodwork today.  6.  No treatment needed for aorta abnormality today.

## 2022-05-17 NOTE — LETTER
5/17/2022       RE: Stephon Wolfe  [address]  [facility] [address]     Dear Colleague,    Thank you for referring your patient, Stephon Wolfe, to the [facility] RHEUMATOLOGY [facility] [place] at [facility]. Please see a copy of my visit note below.    Rheumatology Clinic Visit  [facility]  Omid Taveras M.D.     Stephon Wolfe MRN# [id number]   YOB: 1995 Age: 26 year old   Date of Visit: 05/17/2022  Primary care provider: [facility]          Assessment and Plan:     # Thickened abdominal aorta:  # History of bipolar disorder  # History of acute gastroenteritis/dysentery  # History of latent TB, sufficiently treated by report  # IgA deficiency, mild:    Data: Laboratory evaluation on March 16, 2022 showed slightly elevated IgG 1 of 978; other immunoglobulin subclasses were normal.  IgA was low at 45.  ANCA was negative.  Comprehensive metabolic panel was normal, as was CBC.  Total urine protein was 0; D-dimer was negative.  Hepatitis B surface antibody was positive; surface antigen negative.  Hepatitis C, HIV, and SARS-CoV-2 were negative.  CRP was normal.    Imaging: MRA abdomen April 25, 2022 showed enhancing (mild, endothelial enhancement on T2 weighted scans) aortic wall thickening from the renal origins to the inferior mesenteric artery.  No periaortic fat stranding, T2 signal, or enhancement.  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    We reviewed the following impression and plan:    1.  Aortic wall thickening, noted on CT scan in March 2022, and MRA abdomen in April 2022.  There is no change in the extent of aortic wall thickening between the 2 imaging studies, and there are no findings to suggest active inflammation outside the aorta.  Importantly, there are no symptoms or physical exam findings that suggest the  presence of active systemic inflammatory disease such as vasculitis, malignancy, or chronic infection like TB.  Most likely explanation for the imaging abnormality is old/healed aortic inflammation, that may have occurred following long-ago infection.  No treatment for infection, and no treatment for inflammation is warranted at this time.  I recommend monitoring for development of systemic inflammatory disease symptoms (fever, weight loss, abdominal pain, lower extremity pain with exertion), and assessment of CRP and sedimentation rate today to confirm absence of significant blood-borne inflammation.  I also recommend repeating MR with angiography of the abdomen in 6 months time to assess both modest external iliac vein reduced diameter, and for size and character of the aortic narrowing.  There is no need for restriction on physical activity, diet, or travel at this time.    2.  History of tuberculosis, latent, treated in 2006 per patient report, positive QuantiFERON gold test March 2022.  Most likely, persistently positive tuberculosis test in March 2022 is due to immune system reactivity developed years ago after TB exposure.  There is no evidence of active TB now.  In order to be sure that TB was adequately treated back in 2006, I recommend review of records from that treatment.    Plan:  1.  Repeat imaging (MRI) of the aorta in 6 months.  2.  Resume normal activities with regular physical exercise 35 to 45 minutes 3 to 4 days/week.  3.  Report recurrence of abdominal pain, or of leg/thigh pain that occurs with exercise.  4.  Obtain records from [facility] that will confirm that treatment for latent or hidden TB was adequate in 2006.  5.  CRP/ESR today.  6.  No treatment needed for aorta abnormality today.    RTC 6 mos  Omid Taveras MD  Staff Rheumatologist, [facility]           History of Present Illness:   Stephon DELEON Aiden presents for follow-up of abnormal aorta radiographic  "findings.    #History, May 17, 2022, post hospital discharge.  Patient was encountered by rheumatology in the emergency room in mid March 2022.  Impression at that time was of an acute episode of gastroenteritis/dysentery, and a coincidental abdominal CT finding of aortic wall thickening:    \"...Syndrome of acute gastroenteritis/dysentery is associated with segmental abdominal aortic wall thickening, but no correlates of systemic rheumatic disease, toxin, infection, or even of systemic inflammation are present. Healed/old aortic inflammatory disease is highest on the differential diagnosis of the aortic abnormalities seen on CT scan. MRI with contrast will be more sensitive to inflammatory changes in the aortic endothelium. Therapy may be withheld pending laboratory workup for infection, IgG4-related disease, and while awaiting followup imaging.\"    Patient was hospitalized for observation for 5 days and discharged on March 17, 2022.  Discharge summary listed following benign hospital course:    \" ...26 year old male with a past medical history significant for prediabetes, tension headaches, back pain, hip and knee pain, and bipolar disorder admitted from the ED on 3/12/2022 for emesis and abdominal pain. 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 MRA abdomen confirmed abdominal aortitis, while MRA chest indicated normal physiology with no inflammation in the proximal aorta/subclavian/carotic arteries. Rheumatology consulted and overall work up was negative, except patient found to be partially IgA deficient. Broad ID workup: TB Quantiferon positive for latent TB, and Beta D Glucan also positive. Fungal blood culture pending, and ID will follow up outpatient with results. Suspicion for fungal infection very low, since patient blood cultures were negative. Aortitis DDx includes Takayasu arteritis vs. Aortitis secondary to TB vs. Idiopathic " "aortitis. ID suggesting patient be treated for latent TB, however, he reports prior treatment (around 2006). Patient and family still searching for documentation confirming TB treatment though discussed with ID who feels no need to repeat treatment and that current evidence for treatment is sufficient...\"    Visit conducted through an .    Today, the patient states that he is feeling well.  He has had no recurrence of the abdominal pain that brought him into the emergency room in March.  He has no fevers chills sweats weight loss, malaise abdominal pain, change in bowel habits, skin rash, joint pain, numbness weakness or tingling in arms or legs, cough, shortness of breath, or back pain.    He has not been engaging in vigorous physical exercise, because he understood that he should restrict physical activity while awaiting follow-up of the aortic abnormality.  He reports today that he is eager to resume playing soccer, and wants to know that it will be safe for him to undertake a planned 3-month trip to [facility] starting in late May 2022.           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  No past surgical history on file.         Social History:     Social History     Socioeconomic History     Marital status: Single     Spouse name: Not on file     Number of children: Not on file     Years of education: Not on file     Highest education level: Not on " file   Occupational History     Not on file   Tobacco Use     Smoking status: Never Smoker     Smokeless tobacco: Never Used   Substance and Sexual Activity     Alcohol use: No     Drug use: No     Sexual activity: Not on file   Other Topics Concern     Not on file   Social History Narrative     Not on file     Social Determinants of Health     Financial Resource Strain: Not on file   Food Insecurity: Not on file   Transportation Needs: Not on file   Physical Activity: Not on file   Stress: Not on file   Social Connections: Not on file   Intimate Partner Violence: Not on file   Housing Stability: Not on file          Family History:   No family history on file.         Allergies:   No Known Allergies         Medications:     Current Outpatient Medications   Medication Sig Dispense Refill     acetaminophen (TYLENOL) 325 MG tablet Take 325-650 mg by mouth every 6 hours as needed for mild pain       cholecalciferol 125 MCG (5000 UT) CAPS        diphenhydrAMINE (BENADRYL) 25 MG tablet Take 1 tablet (25 mg) by mouth every 6 hours as needed for itching 56 tablet 0     ergocalciferol (ERGOCALCIFEROL) 1.25 MG (68650 UT) capsule daily.       ibuprofen (ADVIL/MOTRIN) 600 MG tablet Take 600 mg by mouth every 6 hours as needed for moderate pain       traZODone (DESYREL) 100 MG tablet Take 1 tablet (100 mg) by mouth At Bedtime 30 tablet 0            Physical Exam:   Blood pressure 119/84, pulse 63, temperature 97.5  F (36.4  C), resp. rate 16, weight 72.6 kg (160 lb), SpO2 98 %.  Wt Readings from Last 6 Encounters:   05/17/22 72.6 kg (160 lb)   03/12/22 72.6 kg (160 lb)   03/01/22 74.7 kg (164 lb 9.6 oz)   06/13/21 66.7 kg (147 lb)   02/18/21 68 kg (150 lb)   11/26/20 66.4 kg (146 lb 6.4 oz)     Constitutional: well-developed, appearing stated age; cooperative  Eyes: nl EOM, PERRLA, vision, conjunctiva, sclera  ENT: nl external ears, nose, hearing, lips, teeth, gums, throat  No mucous membrane lesions, normal saliva pool  Neck:  no mass or thyroid enlargement  Resp: lungs clear to auscultation, nl to palpation  GI: no ABD mass or tenderness, no HSM, no pulsatile mass palpable in the midline.  Dorsalis pedis pulses symmetrical and full.  Lymph: no cervical, supraclavicular, inguinal or epitrochlear nodes  MS: Normal range of motion in all upper extremity and lower extremity joints.  Skin: no nail pitting, alopecia, rash, nodules or lesions  Neuro: nl cranial nerves, strength, sensation, DTRs.   Psych: nl judgement, orientation, memory, affect.         Data:     @  RHEUM RESULTS Latest Ref Rng & Units 3/6/2021 3/12/2022 3/12/2022   ALBUMIN 3.4 - 5.0 g/dL - 4.1 -   ALT 0 - 70 U/L - 19 -   AST 0 - 45 U/L - 18 -   ALEXANDER INTERPRETATION Negative - - -   CREATININE 0.66 - 1.25 mg/dL 0.87 0.83 -   CRP 0.0 - 8.0 mg/L - - 6.3   GFR ESTIMATE, IF BLACK >60 mL/min/[1.73:m2] >90 - -   GFR ESTIMATE >60 mL/min/1.73m2 >90 >90 -   HEMATOCRIT 40.0 - 53.0 % - 45.7 -   HEMOGLOBIN 13.3 - 17.7 g/dL 15.7 15.5 -   HEPBANG Nonreactive - - -   HCVAB Nonreactive - - -   IGA 84 - 499 mg/dL - - -   IGM 35 - 242 mg/dL - - -   -1,616 mg/dL - - -   WBC 4.0 - 11.0 10e3/uL - 5.5 -   RBC 4.40 - 5.90 10e6/uL - 5.24 -   RDW 10.0 - 15.0 % - 12.4 -   MCHC 31.5 - 36.5 g/dL - 33.9 -   MCV 78 - 100 fL - 87 -   PLATELET COUNT 150 - 450 10e3/uL - 244 -   ESR 0 - 15 mm/hr - 8 -   QUANTIFERON-TB GOLD PLUS RESULT Negative - - -        ,  ,  ,  ,  ,  ,   ALEXANDER interpretation   Date Value Ref Range Status   03/12/2022 Negative Negative Final     Comment:       Negative:              <1:40  Borderline Positive:   1:40 - 1:80  Positive:              >1:80   ,  ,  ,  ,  ,  ,  ,  ,  ,   Hepatitis B Surface Antigen   Date Value Ref Range Status   03/12/2022 Nonreactive Nonreactive Final   ,  ,  ,  ,   Quantiferon-TB Gold Plus   Date Value Ref Range Status   03/13/2022 Positive (A) Negative Final     Comment:     Interferon gamma response to M.tuberculosis antigens was detected,suggesting  infection with M.tuberculosis. Positive results in patients at low risk for infection should be interpreted with caution and repeat testing on a new sample should be considered as recommended by the 2017 ATS/IDSA/CDC Clinical Prac  terry Guidelines for Diagnosis of Tuberculosis in Adults and Children      TB1 Ag minus Nil Value   Date Value Ref Range Status   03/13/2022 8.66 IU/mL Final     TB2 Ag minus Nil Value   Date Value Ref Range Status   03/13/2022 8.66 IU/mL Final     Mitogen minus Nil Result   Date Value Ref Range Status   03/13/2022 8.66 IU/mL Final     Nil Result   Date Value Ref Range Status   03/13/2022 1.34 IU/mL Final   ,  ,  ,  ,  ,  ,  ,  ,  ,  ,   Neutrophil Cytoplasmic Antibody   Date Value Ref Range Status   03/12/2022 <1:10 <1:10 Final   ,   Neutrophil Cytoplasmic Antibody Pattern   Date Value Ref Range Status   03/12/2022   Final    The ANCA IFA is <1:10.  No further testing will be performed.   ,  ,  ,  ,  ,  ,  ,   Immunoglobulin G   Date Value Ref Range Status   03/16/2022 1,445 610-1,616 mg/dL Final   03/16/2022 1,445 610-1,616 mg/dL Final     Immunoglobulin A   Date Value Ref Range Status   03/12/2022 45 (L) 84 - 499 mg/dL Final     Immunoglobulin M   Date Value Ref Range Status   03/12/2022 77 35 - 242 mg/dL Final     Narrative & Impression   MRA ABDOMEN WITH 3D AND MULTIPLANAR RECONSTRUCTIONS 4/25/2022 4:50 PM     CLINICAL HISTORY: Aortitis (H).      COMPARISONS: CT 3/12/2022     REFERRING PROVIDER: IRAIS NEAL     TECHNIQUE: Coronal T2 HASTE, TRUFISP and T1 3D VIBE PRE and axial  TRUFISP, T1 3D VIBE PRE, and 2DTOF sequences obtained. MIP  reconstructions made from the 2DTOF. Coronal FL3D angio sequences  obtained. Post contrast coronal and axial T1 3D VIBE sequences  obtained. Subtraction reconstructions produced.     3D and multiplanar reconstructions were produced.     FINDINGS: Aortic wall thickening again demonstrated from the level of  the renal arteries to the inferior  mesenteric artery. Wall T2 signal  and enhancement. No periaortic fat stranding or T2 signal.     No aortic stenosis or dissection.     Aorta:       Celiac origin: 15 mm       Infrarenal (including thickened wall): 18 mm       Inferior mesenteric origin: 12 mm       Above the bifurcation: 14 mm     Celiac, superior mesenteric, bilateral single renal, and inferior  mesenteric arteries are patent.     Circumaortic left renal artery retroaortic branch crosses behind the  aorta just above the level of the inferior mesenteric artery.     Right arterial and venous and left venous corona mortis were  demonstrated in the previous CT but not included in this study.     Left common iliac vein measures 2.7 mm in diameter between the right  common iliac artery and spine and 7.7 mm laterally for a 65% diameter  stenosis.                                                                      IMPRESSION:  1. Aortitis again demonstrated with enhancing wall thickening from the  renal origins to the inferior mesenteric artery. No periaortic fat  stranding, T2 signal, or enhancement.     2. 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 No thrombus demonstrated.     Reference:  Crispin L, Armandon U, Ulkrissyn S, Korene Z, Petty F. Compression of the  left common iliac vein in asymptomatic subjects and patients with left  iliofemoral deep vein thrombosis. J Vasc Interv Radiol. 2008  Mar;19(3):366-70; quiz 371. doi: 10.1016/j.jvir.2007.09.007. PMID:  [id number].     Compression in excess of 70% can be helpful for identifying possible  underlying May Thurner Syndrome (iliac vein compression syndrome -  IVCS) in patients with a left lower extremity deep vein thrombosis.     HERMAN SUAREZ MD

## 2022-05-17 NOTE — NURSING NOTE
Chief Complaint   Patient presents with     Consult     Follow Up     /84   Pulse 63   Temp 97.5  F (36.4  C)   Resp 16   Wt 72.6 kg (160 lb)   SpO2 98%   BMI 22.83 kg/m    Sukhdeep Fournier on 5/17/2022 at 9:59 AM

## 2022-05-18 ENCOUNTER — HOSPITAL ENCOUNTER (EMERGENCY)
Facility: CLINIC | Age: 27
Discharge: HOME | End: 2022-05-18
Attending: EMERGENCY MEDICINE | Admitting: EMERGENCY MEDICINE
Payer: COMMERCIAL

## 2022-05-18 VITALS
OXYGEN SATURATION: 100 % | DIASTOLIC BLOOD PRESSURE: 74 MMHG | HEIGHT: 66 IN | WEIGHT: 160 LBS | BODY MASS INDEX: 25.71 KG/M2 | SYSTOLIC BLOOD PRESSURE: 109 MMHG | RESPIRATION RATE: 16 BRPM | HEART RATE: 72 BPM | TEMPERATURE: 98.4 F

## 2022-05-18 DIAGNOSIS — J06.9 UPPER RESPIRATORY TRACT INFECTION, UNSPECIFIED TYPE: ICD-10-CM

## 2022-05-18 LAB
FLUAV RNA SPEC QL NAA+PROBE: NEGATIVE
FLUBV RNA RESP QL NAA+PROBE: NEGATIVE
SARS-COV-2 RNA RESP QL NAA+PROBE: NEGATIVE

## 2022-05-18 PROCEDURE — 99283 EMERGENCY DEPT VISIT LOW MDM: CPT | Performed by: EMERGENCY MEDICINE

## 2022-05-18 PROCEDURE — 87636 SARSCOV2 & INF A&B AMP PRB: CPT | Performed by: EMERGENCY MEDICINE

## 2022-05-18 PROCEDURE — 250N000013 HC RX MED GY IP 250 OP 250 PS 637: Performed by: EMERGENCY MEDICINE

## 2022-05-18 PROCEDURE — C9803 HOPD COVID-19 SPEC COLLECT: HCPCS | Performed by: EMERGENCY MEDICINE

## 2022-05-18 RX ORDER — IBUPROFEN 600 MG/1
600 TABLET, FILM COATED ORAL ONCE
Status: COMPLETED | OUTPATIENT
Start: 2022-05-18 | End: 2022-05-18

## 2022-05-18 RX ORDER — HYDROCODONE BITARTRATE AND ACETAMINOPHEN 5; 325 MG/1; MG/1
1 TABLET ORAL ONCE
Status: COMPLETED | OUTPATIENT
Start: 2022-05-18 | End: 2022-05-18

## 2022-05-18 RX ORDER — IBUPROFEN 600 MG/1
600 TABLET, FILM COATED ORAL EVERY 6 HOURS PRN
Qty: 30 TABLET | Refills: 0 | Status: SHIPPED | OUTPATIENT
Start: 2022-05-18

## 2022-05-18 RX ADMIN — HYDROCODONE BITARTRATE AND ACETAMINOPHEN 1 TABLET: 5; 325 TABLET ORAL at 05:17

## 2022-05-18 RX ADMIN — IBUPROFEN 600 MG: 600 TABLET ORAL at 05:17

## 2022-05-18 ASSESSMENT — ENCOUNTER SYMPTOMS
SHORTNESS OF BREATH: 0
WEAKNESS: 0
HEADACHES: 1
DIFFICULTY URINATING: 0
BACK PAIN: 0
SLEEP DISTURBANCE: 0
NECK PAIN: 0
EYE REDNESS: 0
SORE THROAT: 0
NAUSEA: 0
VOMITING: 0
FEVER: 0
DYSPHORIC MOOD: 0
COUGH: 1
ABDOMINAL PAIN: 0

## 2022-05-18 NOTE — ED PROVIDER NOTES
ED Provider Note  [facility]      History     Chief Complaint   Patient presents with     Headache     Cough     H/a since yesterday,  today started coughing     HPI  Stephon Wolfe is a 26 year old male who presents to the emergency department for evaluation of 2-day history of headache and cough.  The patient states that he has had a frontal headache since yesterday.  Reports subjective fever as well.  He complains of a cough has been nonproductive.  He denies any chest pain or dyspnea.  No leg pain or swelling.  No myalgias.  No abdominal pain.  No nausea, vomiting, or diarrhea.  He denies any ill contacts.  He is COVID vaccinated.  He took acetaminophen at 12:30 in the afternoon without significant improvement in his symptoms.  He was unable to sleep tonight so came to the emergency department for evaluation.    Past Medical History  History reviewed. No pertinent past medical history.  History reviewed. No pertinent surgical history.  cholecalciferol 125 MCG (5000 UT) CAPS  ibuprofen (ADVIL/MOTRIN) 600 MG tablet  acetaminophen (TYLENOL) 325 MG tablet  diphenhydrAMINE (BENADRYL) 25 MG tablet  ergocalciferol (ERGOCALCIFEROL) 1.25 MG (35530 UT) capsule  traZODone (DESYREL) 100 MG tablet      No Known Allergies  Family History  History reviewed. No pertinent family history.  Social History   Social History     Tobacco Use     Smoking status: Never Smoker     Smokeless tobacco: Never Used   Substance Use Topics     Alcohol use: No     Drug use: No      Past medical history, past surgical history, medications, allergies, family history, and social history were reviewed with the patient. No additional pertinent items.       Review of Systems   Constitutional: Negative for fever.   HENT: Negative for congestion and sore throat.    Eyes: Negative for redness.   Respiratory: Positive for cough. Negative for shortness of breath.    Cardiovascular: Negative for chest pain.   Gastrointestinal:  "Negative for abdominal pain, nausea and vomiting.   Genitourinary: Negative for difficulty urinating.   Musculoskeletal: Negative for back pain and neck pain.   Skin: Negative for rash.   Neurological: Positive for headaches. Negative for weakness.   Psychiatric/Behavioral: Negative for dysphoric mood, sleep disturbance and suicidal ideas.   All other systems reviewed and are negative.    A complete review of systems was performed with pertinent positives and negatives noted in the HPI, and all other systems negative.    Physical Exam   BP: 109/74  Pulse: 72  Temp: 98.4  F (36.9  C)  Resp: 16  Height: 167.6 cm (5' 6\")  Weight: 72.6 kg (160 lb)  SpO2: 100 %  Physical Exam  Vitals and nursing note reviewed.   Constitutional:       General: He is not in acute distress.     Appearance: Normal appearance. He is not diaphoretic.   HENT:      Head: Normocephalic and atraumatic.   Eyes:      General: No scleral icterus.     Pupils: Pupils are equal, round, and reactive to light.   Cardiovascular:      Rate and Rhythm: Normal rate and regular rhythm.      Pulses: Normal pulses.      Heart sounds: Normal heart sounds.   Pulmonary:      Effort: Pulmonary effort is normal. No respiratory distress.      Breath sounds: Normal breath sounds.   Abdominal:      General: Bowel sounds are normal.      Palpations: Abdomen is soft.      Tenderness: There is no abdominal tenderness.   Musculoskeletal:         General: No tenderness. Normal range of motion.   Skin:     General: Skin is warm and dry.      Capillary Refill: Capillary refill takes less than 2 seconds.      Findings: No rash.   Neurological:      General: No focal deficit present.      Mental Status: He is alert.      Motor: No weakness.      Coordination: Coordination normal.         ED Course      Procedures        The medical record was reviewed and interpreted.  Current labs reviewed and interpreted.       Results for orders placed or performed during the hospital " encounter of 05/18/22   Symptomatic; Yes Influenza A/B & SARS-CoV2 (COVID-19) Virus PCR Multiplex Nasopharyngeal     Status: Normal    Specimen: Nasopharyngeal; Swab   Result Value Ref Range    Influenza A PCR Negative Negative    Influenza B PCR Negative Negative    SARS CoV2 PCR Negative Negative    Narrative    Testing was performed using the lauren SARS-CoV-2 & Influenza A/B Assay on the lauren Leslee System. This test should be ordered for the detection of SARS-CoV-2 and influenza viruses in individuals who meet clinical and/or epidemiological criteria. Test performance is unknown in asymptomatic patients. This test is for in vitro diagnostic use under the FDA EUA for laboratories certified under CLIA to perform moderate and/or high complexity testing. This test has not been FDA cleared or approved. A negative result does not rule out the presence of PCR inhibitors in the specimen or target RNA in concentration below the limit of detection for the assay. If only one viral target is positive but coinfection with multiple targets is suspected, the sample should be re-tested with another FDA cleared, approved or authorized test, if coinfection would change clinical management. [facility] are certified under the Clinical Laboratory Improvement Amendments of 1988 (CLIA-88) as  qualified to perform moderate and/or high complexity laboratory testing.           Results for orders placed or performed during the hospital encounter of 05/18/22   Symptomatic; Yes Influenza A/B & SARS-CoV2 (COVID-19) Virus PCR Multiplex Nasopharyngeal     Status: Normal    Specimen: Nasopharyngeal; Swab   Result Value Ref Range    Influenza A PCR Negative Negative    Influenza B PCR Negative Negative    SARS CoV2 PCR Negative Negative    Narrative    Testing was performed using the lauren SARS-CoV-2 & Influenza A/B Assay on the lauren Leslee System. This test should be ordered for the detection of SARS-CoV-2 and influenza  viruses in individuals who meet clinical and/or epidemiological criteria. Test performance is unknown in asymptomatic patients. This test is for in vitro diagnostic use under the FDA EUA for laboratories certified under CLIA to perform moderate and/or high complexity testing. This test has not been FDA cleared or approved. A negative result does not rule out the presence of PCR inhibitors in the specimen or target RNA in concentration below the limit of detection for the assay. If only one viral target is positive but coinfection with multiple targets is suspected, the sample should be re-tested with another FDA cleared, approved or authorized test, if coinfection would change clinical management. [facility] are certified under the Clinical Laboratory Improvement Amendments of 1988 (CLIA-88) as  qualified to perform moderate and/or high complexity laboratory testing.     Medications   ibuprofen (ADVIL/MOTRIN) tablet 600 mg (600 mg Oral Given 5/18/22 0517)   HYDROcodone-acetaminophen (NORCO) 5-325 MG per tablet 1 tablet (1 tablet Oral Given 5/18/22 0517)        Assessments & Plan (with Medical Decision Making)   26 year old male to the emergency department with 3-day history of frontal headache and cough.  He has normal vital signs and unrevealing physical examination here in the emergency department.  Differential diagnosis includes COVID-19, influenza, viral URI.  Less likely bacterial pneumonia as no fever, normal oxygen saturations, and clear lungs.    COVID-19 and influenza testing negative.  No clinical indication that patient has bacterial pneumonia based on vital signs and physical exam.  Do not feel radiograph is indicated.  He had a normal chest radiograph for similar symptoms 2 months ago.  Suspect viral URI.  He appears clinically well and appropriate for outpatient management.  He will be discharged home.  Ibuprofen prescribed for pain.  He will utilize acetaminophen as needed as  well.  Return precautions provided.  Primary care follow-up in 1 week if not improving.    I have reviewed the nursing notes. I have reviewed the findings, diagnosis, plan and need for follow up with the patient.    Discharge Medication List as of 5/18/2022  6:32 AM          Final diagnoses:   Upper respiratory tract infection, unspecified type     Chart documentation was completed with Dragon voice-recognition software. Even though reviewed, this chart may still contain some grammatical, spelling, and word errors.     --  Will Doip Md  [facility] EMERGENCY DEPARTMENT  5/18/2022     Will Diop MD  05/18/22 1546

## 2022-05-18 NOTE — DISCHARGE INSTRUCTIONS
Take ibuprofen as needed for pain.  You may also take doses of acetaminophen in between doses of ibuprofen.  Drink plenty of fluids.    Follow-up with your primary care clinic in 1 week if not improving.    Return to emergency department if worsening symptoms, trouble breathing, or other concerns.

## 2022-05-18 NOTE — ED TRIAGE NOTES
Pt. has had headache since yesterday,  started with cough and runny nose today.  Took ibuprofen with no relief unable to sleep.     Triage Assessment     Row Name 05/18/22 0324       Triage Assessment (Adult)    Airway WDL WDL       Respiratory WDL    Respiratory WDL WDL       Skin Circulation/Temperature WDL    Skin Circulation/Temperature WDL WDL       Cardiac WDL    Cardiac WDL WDL       Peripheral/Neurovascular WDL    Peripheral Neurovascular WDL WDL       Cognitive/Neuro/Behavioral WDL    Cognitive/Neuro/Behavioral WDL WDL

## 2022-10-13 NOTE — TELEPHONE ENCOUNTER
RECORDS RECEIVED FROM: Internal   DATE RECEIVED: 10.14.22   NOTES (Gather within 2 years) STATUS DETAILS   OFFICE NOTE from referring provider   Internal 3.18.22  [facility] Care Coordination   OFFICE NOTE from other specialist Internal 5.18.22  [facility]  Rheumatology   DISCHARGE SUMMARY from hospital Internal 3.12-3.17.22  [facility]   DISCHARGE REPORT from the ER Internal 5.18.22  [facility]    3.19.22  [facility]   LABS (any labs) Internal    MEDICATION LIST Internal    IMAGING  (NEED IMAGES AND REPORTS)     Other (anything related to diagnoses Internal 3.16.22  [facility]    3.12.22  [facility] Chest

## 2022-10-14 ENCOUNTER — PRE VISIT (OUTPATIENT)
Dept: INFECTIOUS DISEASES | Facility: CLINIC | Age: 27
End: 2022-10-14

## 2022-10-22 ENCOUNTER — HOSPITAL ENCOUNTER (EMERGENCY)
Facility: CLINIC | Age: 27
Discharge: HOME | End: 2022-10-23
Attending: EMERGENCY MEDICINE | Admitting: EMERGENCY MEDICINE
Payer: COMMERCIAL

## 2022-10-22 DIAGNOSIS — R53.83 FATIGUE, UNSPECIFIED TYPE: ICD-10-CM

## 2022-10-22 DIAGNOSIS — R51.9 NONINTRACTABLE HEADACHE, UNSPECIFIED CHRONICITY PATTERN, UNSPECIFIED HEADACHE TYPE: ICD-10-CM

## 2022-10-22 LAB
ALBUMIN SERPL-MCNC: 3.9 G/DL (ref 3.4–5)
ALP SERPL-CCNC: 98 U/L (ref 40–150)
ALT SERPL W P-5'-P-CCNC: 18 U/L (ref 0–70)
ANION GAP SERPL CALCULATED.3IONS-SCNC: <1 MMOL/L (ref 3–14)
AST SERPL W P-5'-P-CCNC: 13 U/L (ref 0–45)
BASOPHILS # BLD AUTO: 0 10E3/UL (ref 0–0.2)
BASOPHILS NFR BLD AUTO: 0 %
BILIRUB SERPL-MCNC: 0.4 MG/DL (ref 0.2–1.3)
BUN SERPL-MCNC: 9 MG/DL (ref 7–30)
CALCIUM SERPL-MCNC: 9 MG/DL (ref 8.5–10.1)
CHLORIDE BLD-SCNC: 107 MMOL/L (ref 94–109)
CO2 SERPL-SCNC: 32 MMOL/L (ref 20–32)
CREAT SERPL-MCNC: 0.78 MG/DL (ref 0.66–1.25)
EOSINOPHIL # BLD AUTO: 0.1 10E3/UL (ref 0–0.7)
EOSINOPHIL NFR BLD AUTO: 1 %
ERYTHROCYTE [DISTWIDTH] IN BLOOD BY AUTOMATED COUNT: 12.4 % (ref 10–15)
FLUAV RNA SPEC QL NAA+PROBE: NEGATIVE
FLUBV RNA RESP QL NAA+PROBE: NEGATIVE
GFR SERPL CREATININE-BSD FRML MDRD: >90 ML/MIN/1.73M2
GLUCOSE BLD-MCNC: 99 MG/DL (ref 70–99)
HCT VFR BLD AUTO: 41.9 % (ref 40–53)
HGB BLD-MCNC: 14.3 G/DL (ref 13.3–17.7)
IMM GRANULOCYTES # BLD: 0 10E3/UL
IMM GRANULOCYTES NFR BLD: 0 %
LYMPHOCYTES # BLD AUTO: 2.5 10E3/UL (ref 0.8–5.3)
LYMPHOCYTES NFR BLD AUTO: 47 %
MAGNESIUM SERPL-MCNC: 2.1 MG/DL (ref 1.6–2.3)
MCH RBC QN AUTO: 29.6 PG (ref 26.5–33)
MCHC RBC AUTO-ENTMCNC: 34.1 G/DL (ref 31.5–36.5)
MCV RBC AUTO: 87 FL (ref 78–100)
MONOCYTES # BLD AUTO: 0.7 10E3/UL (ref 0–1.3)
MONOCYTES NFR BLD AUTO: 12 %
NEUTROPHILS # BLD AUTO: 2.2 10E3/UL (ref 1.6–8.3)
NEUTROPHILS NFR BLD AUTO: 40 %
NRBC # BLD AUTO: 0 10E3/UL
NRBC BLD AUTO-RTO: 0 /100
PHOSPHATE SERPL-MCNC: 3 MG/DL (ref 2.5–4.5)
PLATELET # BLD AUTO: 229 10E3/UL (ref 150–450)
POTASSIUM BLD-SCNC: 3.7 MMOL/L (ref 3.4–5.3)
PROT SERPL-MCNC: 7.8 G/DL (ref 6.8–8.8)
RBC # BLD AUTO: 4.83 10E6/UL (ref 4.4–5.9)
RSV RNA SPEC NAA+PROBE: NEGATIVE
SARS-COV-2 RNA RESP QL NAA+PROBE: NEGATIVE
SODIUM SERPL-SCNC: 139 MMOL/L (ref 133–144)
TSH SERPL DL<=0.005 MIU/L-ACNC: 2.45 MU/L (ref 0.4–4)
WBC # BLD AUTO: 5.5 10E3/UL (ref 4–11)

## 2022-10-22 PROCEDURE — 258N000003 HC RX IP 258 OP 636: Performed by: EMERGENCY MEDICINE

## 2022-10-22 PROCEDURE — 99284 EMERGENCY DEPT VISIT MOD MDM: CPT | Mod: CS | Performed by: EMERGENCY MEDICINE

## 2022-10-22 PROCEDURE — 84443 ASSAY THYROID STIM HORMONE: CPT | Performed by: EMERGENCY MEDICINE

## 2022-10-22 PROCEDURE — 250N000013 HC RX MED GY IP 250 OP 250 PS 637: Performed by: EMERGENCY MEDICINE

## 2022-10-22 PROCEDURE — 36415 COLL VENOUS BLD VENIPUNCTURE: CPT | Performed by: EMERGENCY MEDICINE

## 2022-10-22 PROCEDURE — 85025 COMPLETE CBC W/AUTO DIFF WBC: CPT | Performed by: EMERGENCY MEDICINE

## 2022-10-22 PROCEDURE — 99283 EMERGENCY DEPT VISIT LOW MDM: CPT | Mod: CS,25 | Performed by: EMERGENCY MEDICINE

## 2022-10-22 PROCEDURE — 83735 ASSAY OF MAGNESIUM: CPT | Performed by: EMERGENCY MEDICINE

## 2022-10-22 PROCEDURE — 84100 ASSAY OF PHOSPHORUS: CPT | Performed by: EMERGENCY MEDICINE

## 2022-10-22 PROCEDURE — C9803 HOPD COVID-19 SPEC COLLECT: HCPCS | Performed by: EMERGENCY MEDICINE

## 2022-10-22 PROCEDURE — 96360 HYDRATION IV INFUSION INIT: CPT | Performed by: EMERGENCY MEDICINE

## 2022-10-22 PROCEDURE — 87637 SARSCOV2&INF A&B&RSV AMP PRB: CPT | Performed by: EMERGENCY MEDICINE

## 2022-10-22 PROCEDURE — 80053 COMPREHEN METABOLIC PANEL: CPT | Performed by: EMERGENCY MEDICINE

## 2022-10-22 RX ORDER — ACETAMINOPHEN 500 MG
1000 TABLET ORAL ONCE
Status: COMPLETED | OUTPATIENT
Start: 2022-10-22 | End: 2022-10-22

## 2022-10-22 RX ORDER — IBUPROFEN 600 MG/1
600 TABLET, FILM COATED ORAL ONCE
Status: COMPLETED | OUTPATIENT
Start: 2022-10-22 | End: 2022-10-22

## 2022-10-22 RX ADMIN — SODIUM CHLORIDE 1000 ML: 9 INJECTION, SOLUTION INTRAVENOUS at 22:54

## 2022-10-22 RX ADMIN — ACETAMINOPHEN 1000 MG: 500 TABLET ORAL at 22:25

## 2022-10-22 RX ADMIN — IBUPROFEN 600 MG: 600 TABLET ORAL at 22:25

## 2022-10-22 ASSESSMENT — ENCOUNTER SYMPTOMS
HEADACHES: 1
NECK PAIN: 0
SHORTNESS OF BREATH: 0
NAUSEA: 0
MYALGIAS: 1
NECK STIFFNESS: 0
DIARRHEA: 1
FEVER: 0
FATIGUE: 1
SLEEP DISTURBANCE: 1
NUMBNESS: 0
WEAKNESS: 0
VOMITING: 0

## 2022-10-22 ASSESSMENT — ACTIVITIES OF DAILY LIVING (ADL): ADLS_ACUITY_SCORE: 35

## 2022-10-23 VITALS
RESPIRATION RATE: 18 BRPM | HEART RATE: 67 BPM | TEMPERATURE: 97.3 F | BODY MASS INDEX: 24.25 KG/M2 | DIASTOLIC BLOOD PRESSURE: 74 MMHG | SYSTOLIC BLOOD PRESSURE: 106 MMHG | OXYGEN SATURATION: 100 % | HEIGHT: 68 IN | WEIGHT: 160 LBS

## 2022-10-23 NOTE — DISCHARGE INSTRUCTIONS
Take Tylenol and Motrin as directed for headache.  Plenty of rest and make sure to drink plenty of water.  If you need help sleeping at night, you can start taking melatonin 5 mg 30 minutes before bedtime.      If you are still having persistent headaches and fatigue next week call to make an appointment for follow-up with your primary care doctor.

## 2022-10-23 NOTE — ED PROVIDER NOTES
[facility] EMERGENCY DEPARTMENT ([facility])     October 22, 2022     History     Chief Complaint   Patient presents with     Headache     Fatigue     Past 10 days     HPI  Stephon Wolfe is a 26 year old male with a past medical history including aortitis, mild intermittent asthma, GERD, bipolar I disorder who presents to the Emergency Department for evaluation of headache, generalized body aches, and fatigue. The patient reports that over the past few weeks he has had an intermittent headache.  For the past 10 days, this frontal headache has been persistent and does not resolve with Tylenol or ibuprofen.    The patient states that he has been having trouble sleeping and feels tired and low energy all the time.  He endorses generalized body aches.  He notes that he had a couple episodes of diarrhea the other day but denies nausea or vomiting.  He has not had any sick contacts with similar symptoms.  He states that he is drinking and eating okay.    The patient denies neck pain or stiffness.  Denies vision changes, numbness tingling or weakness, rash, chest pain, shortness of breath, trauma, or fever.      Past Medical History  History reviewed. No pertinent past medical history.  History reviewed. No pertinent surgical history.  ibuprofen (ADVIL/MOTRIN) 600 MG tablet  acetaminophen (TYLENOL) 325 MG tablet  cholecalciferol 125 MCG (5000 UT) CAPS  diphenhydrAMINE (BENADRYL) 25 MG tablet  ergocalciferol (ERGOCALCIFEROL) 1.25 MG (55987 UT) capsule  traZODone (DESYREL) 100 MG tablet      No Known Allergies  Past medical history, past surgical history, medications, and allergies were reviewed with the patient. Additional pertinent items: mild intermittent asthma, GERD, bipolar I disorder retrieved from Care Everywhere.    Family History  History reviewed. No pertinent family history.  Family history was reviewed with the patient. Additional pertinent items: None    Social History  Social History     Tobacco  "Use     Smoking status: Never     Smokeless tobacco: Never   Substance Use Topics     Alcohol use: No     Drug use: No      Social history was reviewed with the patient. Additional pertinent items: None      Review of Systems   Constitutional: Positive for fatigue. Negative for fever.   Eyes: Negative for visual disturbance.   Respiratory: Negative for shortness of breath.    Cardiovascular: Negative for chest pain.   Gastrointestinal: Positive for diarrhea. Negative for nausea and vomiting.   Musculoskeletal: Positive for myalgias. Negative for neck pain and neck stiffness.   Skin: Negative for rash.   Neurological: Positive for headaches. Negative for weakness and numbness.   Psychiatric/Behavioral: Positive for sleep disturbance.   All other systems reviewed and are negative.    A complete review of systems was performed with pertinent positives and negatives noted in the HPI, and all other systems negative.    Physical Exam   BP: 106/74  Pulse: 67  Temp: 97.3  F (36.3  C)  Resp: 18  Height: 172.7 cm (5' 8\")  Weight: 72.6 kg (160 lb)  SpO2: 100 %  Physical Exam  Constitutional:       General: he is not in acute distress.  HENT:      Head: Normocephalic and atraumatic.      Pharynx: No oropharyngeal exudate.   Eyes:      Extraocular Movements: Extraocular movements intact.      Conjunctiva/sclera: Conjunctivae normal.      Pupils: Pupils are equal, round, and reactive to light.   Cardiovascular:      Rate and Rhythm: Normal rate and regular rhythm.  Normal heart sounds. No murmur heard.  Pulmonary:  No respiratory distress. Normal breath sounds.   Abdominal:      General: There is no distension.      Palpations: Abdomen is soft. There is no mass.      Tenderness: There is no abdominal tenderness.   Musculoskeletal:         General: No swelling or tenderness.      Cervical back: Normal range of motion. No tenderness.      Comments: Moving all extremities spontaneously.  Grossly intact strength and sensation all " extremities.   Skin:     General: Skin is warm.      Findings: No rash.   Neurological:      General: No focal deficit present.      Cranial Nerves: No cranial nerve deficit.   Psychiatric:         Mood and Affect: Mood normal.       ED Course      Procedures       The medical record was reviewed and interpreted.  Current labs reviewed and interpreted.              No results found for any visits on 10/22/22.  Medications - No data to display     Assessments & Plan (with Medical Decision Making)   Patient arrives to the emergency department for complaint of headaches, body aches, fatigue.  Differential diagnosis includes but is not limited to COVID-19, influenza, other viral illness, dehydration, tension headache.    Provided patient with Tylenol and ibuprofen as well as 1 L normal saline.  Blood work unremarkable with normal electrolytes.  TSH within normal limits.  COVID-19 and influenza PCR negative.    On reevaluation, he states that his headache is improved and he feels much better.  I discussed drinking more fluid at home and getting plenty of rest, having normal wake sleep schedule.  If he feels that he has not sleeping well, he can try melatonin 30 minutes prior to sleep.  He will take Tylenol and Motrin as directed for headache.  I provided him information for primary care physician that he can follow-up with if symptoms do not start to improve within 1 or 2 weeks.    I have reviewed the nursing notes. I have reviewed the findings, diagnosis, plan and need for follow up with the patient.    New Prescriptions    No medications on file       Final diagnoses:   None     Leonie DAILEY, am serving as a trained medical scribe to document services personally performed by Dorina Garcia DO, based on the provider's statements to me.     Dorina DAILEY DO, was physically present and have reviewed and verified the accuracy of this note documented by Leonie Marin.   --  Dorina Garcia DO  [facility]  EMERGENCY DEPARTMENT  10/22/2022     Dorina Garcia MD  10/23/22 0031

## 2022-10-23 NOTE — ED TRIAGE NOTES
Past 10 day head and body aches.  Denies fever.     Triage Assessment     Row Name 10/22/22 2029       Triage Assessment (Adult)    Airway WDL WDL       Respiratory WDL    Respiratory WDL WDL       Skin Circulation/Temperature WDL    Skin Circulation/Temperature WDL WDL       Cardiac WDL    Cardiac WDL WDL       Cognitive/Neuro/Behavioral WDL    Cognitive/Neuro/Behavioral WDL WDL               Spoke with patient regarding his cold sx. He was seen in the  on 2/20.   Pt reported that his  has an appt today at 3pm and is wondering if MD could see him at the same time. Writer informed pt that unfortunately the only appt available today was at 1015am. Dr Mojica is booked before and after his husbands appointment.   Writer asked pt if he was informed of this or if there was an acute appt available at 1015am today.  Pt reported no, but is agreeable to that appointment at 1015am.

## 2022-11-07 ENCOUNTER — HOSPITAL ENCOUNTER (EMERGENCY)
Facility: CLINIC | Age: 27
Discharge: HOME | End: 2022-11-07
Attending: EMERGENCY MEDICINE | Admitting: EMERGENCY MEDICINE
Payer: COMMERCIAL

## 2022-11-07 VITALS
DIASTOLIC BLOOD PRESSURE: 79 MMHG | OXYGEN SATURATION: 98 % | HEART RATE: 77 BPM | RESPIRATION RATE: 16 BRPM | TEMPERATURE: 97.9 F | SYSTOLIC BLOOD PRESSURE: 112 MMHG

## 2022-11-07 DIAGNOSIS — J06.9 UPPER RESPIRATORY TRACT INFECTION, UNSPECIFIED TYPE: ICD-10-CM

## 2022-11-07 LAB
FLUAV RNA SPEC QL NAA+PROBE: NEGATIVE
FLUBV RNA RESP QL NAA+PROBE: NEGATIVE
RSV RNA SPEC NAA+PROBE: NEGATIVE
SARS-COV-2 RNA RESP QL NAA+PROBE: NEGATIVE

## 2022-11-07 PROCEDURE — 250N000013 HC RX MED GY IP 250 OP 250 PS 637: Performed by: EMERGENCY MEDICINE

## 2022-11-07 PROCEDURE — 87637 SARSCOV2&INF A&B&RSV AMP PRB: CPT | Performed by: EMERGENCY MEDICINE

## 2022-11-07 PROCEDURE — 99283 EMERGENCY DEPT VISIT LOW MDM: CPT | Mod: CS | Performed by: EMERGENCY MEDICINE

## 2022-11-07 PROCEDURE — C9803 HOPD COVID-19 SPEC COLLECT: HCPCS | Performed by: EMERGENCY MEDICINE

## 2022-11-07 PROCEDURE — 99284 EMERGENCY DEPT VISIT MOD MDM: CPT | Mod: CS | Performed by: EMERGENCY MEDICINE

## 2022-11-07 RX ORDER — DIPHENHYDRAMINE HCL 25 MG
25 CAPSULE ORAL ONCE
Status: COMPLETED | OUTPATIENT
Start: 2022-11-07 | End: 2022-11-07

## 2022-11-07 RX ORDER — BENZONATATE 100 MG/1
100 CAPSULE ORAL ONCE
Status: COMPLETED | OUTPATIENT
Start: 2022-11-07 | End: 2022-11-07

## 2022-11-07 RX ORDER — BENZONATATE 100 MG/1
100 CAPSULE ORAL 3 TIMES DAILY PRN
Qty: 15 CAPSULE | Refills: 0 | Status: SHIPPED | OUTPATIENT
Start: 2022-11-07

## 2022-11-07 RX ORDER — ACETAMINOPHEN 500 MG
1000 TABLET ORAL ONCE
Status: COMPLETED | OUTPATIENT
Start: 2022-11-07 | End: 2022-11-07

## 2022-11-07 RX ADMIN — DIPHENHYDRAMINE HYDROCHLORIDE 25 MG: 25 CAPSULE ORAL at 03:29

## 2022-11-07 RX ADMIN — BENZONATATE 100 MG: 100 CAPSULE ORAL at 03:29

## 2022-11-07 RX ADMIN — ACETAMINOPHEN 1000 MG: 500 TABLET ORAL at 03:29

## 2022-11-07 ASSESSMENT — ENCOUNTER SYMPTOMS
DYSURIA: 0
CONFUSION: 0
SHORTNESS OF BREATH: 0
DIZZINESS: 0
WEAKNESS: 0
EYE PAIN: 0
NECK PAIN: 0
PALPITATIONS: 0
DIARRHEA: 0
FEVER: 0
ABDOMINAL DISTENTION: 0
BACK PAIN: 0
CHILLS: 0
NAUSEA: 0
COLOR CHANGE: 0
ABDOMINAL PAIN: 0
SORE THROAT: 0
HEADACHES: 1
CHEST TIGHTNESS: 0
VOMITING: 0
ARTHRALGIAS: 0
COUGH: 1
MYALGIAS: 0
FATIGUE: 1
FREQUENCY: 0
CONSTIPATION: 0
DIFFICULTY URINATING: 0

## 2022-11-07 NOTE — ED PROVIDER NOTES
ED Provider Note  [facility]      History     Chief Complaint   Patient presents with     Cold Symptoms     Patient reports cough, headache, and sore throat for the past four days     HPI  Stephon Wolfe is a 26 year old male presents to the ED for evaluation of URI symptoms.  Symptoms started 4 days ago.  Gradual onset.  No particular aggravating alleviating factors.  Primarily, reports dry cough, generalized headache, generalized for the past 4 days.  Has intermittently had a sore throat as well.  Denies sick contacts.  Denies recent travel.  Symptoms unrelieved with acetaminophen and NyQuil.    Past Medical History  History reviewed. No pertinent past medical history.  History reviewed. No pertinent surgical history.  benzonatate (TESSALON) 100 MG capsule  doxylamine (UNISOM) 25 MG TABS tablet  acetaminophen (TYLENOL) 325 MG tablet  cholecalciferol 125 MCG (5000 UT) CAPS  ergocalciferol (ERGOCALCIFEROL) 1.25 MG (59490 UT) capsule  ibuprofen (ADVIL/MOTRIN) 600 MG tablet  traZODone (DESYREL) 100 MG tablet      No Known Allergies  Family History  History reviewed. No pertinent family history.  Social History   Social History     Tobacco Use     Smoking status: Never     Smokeless tobacco: Never   Substance Use Topics     Alcohol use: No     Drug use: No      Past medical history, past surgical history, medications, allergies, family history, and social history were reviewed with the patient. No additional pertinent items.       Review of Systems   Constitutional: Positive for fatigue. Negative for chills and fever.   HENT: Negative for congestion and sore throat.    Eyes: Negative for pain and visual disturbance.   Respiratory: Positive for cough. Negative for chest tightness and shortness of breath.    Cardiovascular: Negative for chest pain and palpitations.   Gastrointestinal: Negative for abdominal distention, abdominal pain, constipation, diarrhea, nausea and vomiting.    Genitourinary: Negative for difficulty urinating, dysuria, frequency and urgency.   Musculoskeletal: Negative for arthralgias, back pain, myalgias and neck pain.   Skin: Negative for color change and rash.   Neurological: Positive for headaches. Negative for dizziness and weakness.   Psychiatric/Behavioral: Negative for confusion.     A complete review of systems was performed with pertinent positives and negatives noted in the HPI, and all other systems negative.    Physical Exam   BP: 112/79  Pulse: 77  Temp: 97.9  F (36.6  C)  Resp: 16  SpO2: 98 %  Physical Exam  Vitals and nursing note reviewed.   Constitutional:       General: He is not in acute distress.     Appearance: Normal appearance. He is not ill-appearing or toxic-appearing.   HENT:      Head: Normocephalic and atraumatic.      Nose: Nose normal.      Mouth/Throat:      Mouth: Mucous membranes are moist.      Pharynx: Oropharynx is clear. No oropharyngeal exudate or posterior oropharyngeal erythema.   Eyes:      Pupils: Pupils are equal, round, and reactive to light.   Cardiovascular:      Rate and Rhythm: Normal rate.      Pulses: Normal pulses.      Heart sounds: Normal heart sounds.   Pulmonary:      Effort: Pulmonary effort is normal. No respiratory distress.      Breath sounds: Normal breath sounds.      Comments: Dry cough noted  Abdominal:      General: Abdomen is flat. There is no distension.   Musculoskeletal:         General: No swelling or deformity. Normal range of motion.      Cervical back: Normal range of motion. No rigidity.   Skin:     General: Skin is warm.      Capillary Refill: Capillary refill takes less than 2 seconds.   Neurological:      Mental Status: He is alert and oriented to person, place, and time.   Psychiatric:         Mood and Affect: Mood normal.         ED Course      Procedures       The medical record was reviewed and interpreted.  Current labs reviewed and interpreted.  Managed outpatient prescription  medications.              Results for orders placed or performed during the hospital encounter of 11/07/22   Symptomatic; Yes; 11/3/2022 Influenza A/B & SARS-CoV2 (COVID-19) Virus PCR Multiplex Nasopharyngeal     Status: Normal    Specimen: Nasopharyngeal; Swab   Result Value Ref Range    Influenza A PCR Negative Negative    Influenza B PCR Negative Negative    RSV PCR Negative Negative    SARS CoV2 PCR Negative Negative    Narrative    Testing was performed using the Xpert Xpress CoV2/Flu/RSV Assay on the [employer] GeneXpert Instrument. This test should be ordered for the detection of SARS-CoV-2 and influenza viruses in individuals who meet clinical and/or epidemiological criteria. Test performance is unknown in asymptomatic patients. This test is for in vitro diagnostic use under the FDA EUA for laboratories certified under CLIA to perform high or moderate complexity testing. This test has not been FDA cleared or approved. A negative result does not rule out the presence of PCR inhibitors in the specimen or target RNA in concentration below the limit of detection for the assay. If only one viral target is positive but coinfection with multiple targets is suspected, the sample should be re-tested with another FDA cleared, approved, or authorized test, if coinfection would change clinical management. This test was validated by the [facility]. These laboratories are certified under the Clinical Laboratory Improvement Amendments of 1988 (CLIA-88) as qualified to perform high complexity laboratory testing.     Medications   benzonatate (TESSALON) capsule 100 mg (100 mg Oral Given 11/7/22 0329)   diphenhydrAMINE (BENADRYL) capsule 25 mg (25 mg Oral Given 11/7/22 0329)   acetaminophen (TYLENOL) tablet 1,000 mg (1,000 mg Oral Given 11/7/22 0329)        Assessments & Plan (with Medical Decision Making)   Patient presents for evaluation of URI symptoms.    On arrival, patient's normal vital signs.  Overall  appears well nontoxic.  His lungs are clear to auscultation.  No respiratory distress.  Low suspicion for significant lung consolidation/bacterial pneumonia.  Oropharynx is clear without exudate, unlikely pharyngitis/tonsillitis.    COVID is negative.  Influenza is negative.  RSV negative.    Symptoms consistent with likely viral URI.  Patient was given benzonatate for his cough, acetaminophen for his headache, and diphenhydramine for his insomnia.  Provided with prescriptions for benzonatate and doxylamine.  Recommend PCP follow-up.  Educated reasons to return to the ED.    I have reviewed the nursing notes. I have reviewed the findings, diagnosis, plan and need for follow up with the patient.    Discharge Medication List as of 11/7/2022  3:57 AM      START taking these medications    Details   benzonatate (TESSALON) 100 MG capsule Take 1 capsule (100 mg) by mouth 3 times daily as needed for cough, Disp-15 capsule, R-0, Local Print      doxylamine (UNISOM) 25 MG TABS tablet Take 1 tablet (25 mg) by mouth nightly as needed for sleep, Disp-15 tablet, R-0, Local Print             Final diagnoses:   Upper respiratory tract infection, unspecified type       --  Fredy Asencio DO  [facility] EMERGENCY DEPARTMENT  11/7/2022     Fredy Asencio DO  11/07/22 0516

## 2022-11-07 NOTE — ED TRIAGE NOTES
Patient reports cough, headache, and sore throat for the past four days     Triage Assessment     Row Name 11/07/22 0259       Triage Assessment (Adult)    Airway WDL WDL       Respiratory WDL    Respiratory WDL X;cough    Cough Frequency frequent    Cough Type dry       Skin Circulation/Temperature WDL    Skin Circulation/Temperature WDL WDL       Cardiac WDL    Cardiac WDL WDL       Peripheral/Neurovascular WDL    Peripheral Neurovascular WDL WDL       Cognitive/Neuro/Behavioral WDL    Cognitive/Neuro/Behavioral WDL WDL

## 2022-11-18 ENCOUNTER — TELEPHONE (OUTPATIENT)
Dept: RHEUMATOLOGY | Facility: CLINIC | Age: 27
End: 2022-11-18

## 2022-11-18 NOTE — TELEPHONE ENCOUNTER
Patient said he missed his MRI appointment today and when he called to reschedule it he was told by imaging scheduling that Dr. Taveras would need to place a new order.

## 2022-11-20 NOTE — PROGRESS NOTES
Rheumatology Clinic Visit  [facility]  Omid Taveras M.D.     Stephon Wolfe MRN# [id number]   YOB: 1995 Age: 26 year old   Date of Visit: 11/25/2022  Primary care provider: [facility]          Assessment and Plan:     # Thickened abdominal aorta  # History of bipolar disorder  # History of acute gastroenteritis/dysentery  # History of latent TB, sufficiently treated by report  # IgA deficiency, mild:    Patient relates squeezing epigastric pain, self-limited, occurring several times weekly, relieved with eating.  Physical exam shows mild epigastric tenderness without rebound.  Cardiovascular exam is normal  Data:  Lab work on October 22, 2022 showed comprehensive metabolic panel, TSH, CBC all normal.    Imaging: MRA abdomen April 25, 2022 showed enhancing (mild, endothelial enhancement on T2 weighted scans) aortic wall thickening from the renal origins to the inferior mesenteric artery.  No periaortic fat stranding, T2 signal, or enhancement.  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  Repeat MRI of the abdomen is scheduled for December 10, 2022.    Discussion: There continue to be no systemic inflammatory or worrisome symptoms or signs associated with a incidental radiographic finding of aortic wall thickening, noted on CT scan in March 2022, and MRA abdomen in April 2022.  In clinic today, the patient's apparent vigorous good health argues strongly against the presence of untreated or progressive systemic inflammatory disease such as vasculitis, malignancy, or chronic infection like TB.  Most likely explanation for the imaging abnormality remains old/healed aortic inflammation, that may have occurred following long-ago infection.  No treatment for infection, and no treatment for inflammation is warranted presently.  I also recommend repeating MR with angiography of the abdomen (scheduled in 2  "weeks) to assess both modest external iliac vein reduced diameter, and for size and character of the aortic narrowing.  There is no need for restriction on physical activity, diet, or travel at this time. recommend monitoring for development of systemic inflammatory disease symptoms (fever, weight loss, abdominal pain, lower extremity pain with exertion).    # History of tuberculosis, latent, treated in 2006 per patient report, positive QuantiFERON gold test March 2022.  Most likely, persistently positive tuberculosis test in March 2022 is due to immune system reactivity developed years ago after TB exposure.  There is no evidence of active TB now. Patient plans followup with infectious disease.    #Epigastric pain: Association with eating points towards gastric or esophageal pain generator.  I recommend trial of omeprazole 20 mg daily for 30 days.  Follow-up with primary care if no improvement.      We reviewed the following plan:    1.  Repeat imaging (MRI) of the aorta  2.  No activity restrictions; Followup with primary care doctor for chest pain and for leg/knee pain.  3.  Report recurrence of abdominal pain, or of leg/thigh pain that occurs with exercise.  4.  No treatment needed for aorta abnormality today.  5. Trial of omeprazole 20 mg daily for abdominal pain    RTC 12 mos  Omid Taveras MD  Staff Rheumatologist, [facility]           History of Present Illness:   Stephon Wolfe presents for follow-up of abnormal aorta radiographic findings. Last seen in May 2022.  Impression at that time was of aortic wall thickening noted on CT scan in March 2022 and MRA of the abdomen in April 2022.  Patient also had history of positive TB test, treated in 2006.  Plan was for watchful waiting, and repeat MRI of the aorta in 6 months.    Interval history November 25, 2022    [place] .  He reports enjoying vigorous good health while overseas.  He reports \"heart pain\", like \"something heavy\" " "on the L side of chest, occurs  When sleeping, sometimes when walking. No SOB, no nausea , no HA. Sensation goes away in 5-30 minutes.  He had cough and fever 2 weeks ago; symptoms are improved.  He notes L > R knee pain; pain is severe enough that he has not played sports.  Otherwise, he feels well.  Good energy level, good appetite, and no restriction in activities based on abdominal pain.    History, May 17, 2022, post hospital discharge.  Patient was encountered by rheumatology in the emergency room in mid March 2022.  Impression at that time was of an acute episode of gastroenteritis/dysentery, and a coincidental abdominal CT finding of aortic wall thickening:    \"...Syndrome of acute gastroenteritis/dysentery is associated with segmental abdominal aortic wall thickening, but no correlates of systemic rheumatic disease, toxin, infection, or even of systemic inflammation are present. Healed/old aortic inflammatory disease is highest on the differential diagnosis of the aortic abnormalities seen on CT scan. MRI with contrast will be more sensitive to inflammatory changes in the aortic endothelium. Therapy may be withheld pending laboratory workup for infection, IgG4-related disease, and while awaiting followup imaging.\"    Patient was hospitalized for observation for 5 days and discharged on March 17, 2022.  Discharge summary listed following benign hospital course:    \" ...26 year old male with a past medical history significant for prediabetes, tension headaches, back pain, hip and knee pain, and bipolar disorder admitted from the ED on 3/12/2022 for emesis and abdominal pain. 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 MRA abdomen confirmed abdominal aortitis, while MRA chest indicated normal physiology with no inflammation in the proximal aorta/subclavian/carotic arteries. Rheumatology consulted and overall work up was negative, except " "patient found to be partially IgA deficient. Broad ID workup: TB Quantiferon positive for latent TB, and Beta D Glucan also positive. Fungal blood culture pending, and ID will follow up outpatient with results. Suspicion for fungal infection very low, since patient blood cultures were negative. Aortitis DDx includes Takayasu arteritis vs. Aortitis secondary to TB vs. Idiopathic aortitis. ID suggesting patient be treated for latent TB, however, he reports prior treatment (around 2006). Patient and family still searching for documentation confirming TB treatment though discussed with ID who feels no need to repeat treatment and that current evidence for treatment is sufficient...\"    Visit conducted through an .    Today, the patient states that he is feeling well.  He has had no recurrence of the abdominal pain that brought him into the emergency room in March.  He has no fevers chills sweats weight loss, malaise abdominal pain, change in bowel habits, skin rash, joint pain, numbness weakness or tingling in arms or legs, cough, shortness of breath, or back pain.    He has not been engaging in vigorous physical exercise, because he understood that he should restrict physical activity while awaiting follow-up of the aortic abnormality.  He reports today that he is eager to resume playing soccer, and wants to know that it will be safe for him to undertake a planned 3-month trip to [facility] starting in late May 2022.           Review of Systems:     Constitutional: negative  Skin: negative  Eyes: negative  Ears/Nose/Throat: negative  Respiratory: HPI  Cardiovascular: HPI  Gastrointestinal: 5 days ago, he had squeezing pain for 2 days in upper abdomen. No abd pain prior.  Genitourinary: negative  Musculoskeletal: negative  Neurologic: negative  Psychiatric: negative  Hematologic/Lymphatic/Immunologic: negative  Endocrine: negative         Active Problem List:     Patient Active Problem List    Diagnosis Date Noted "     Aortitis (H) 03/12/2022     Priority: Medium            Past Medical History:   No past medical history on file.  No past surgical history on file.         Social History:     Social History     Socioeconomic History     Marital status: Single     Spouse name: Not on file     Number of children: Not on file     Years of education: Not on file     Highest education level: Not on file   Occupational History     Not on file   Tobacco Use     Smoking status: Never     Smokeless tobacco: Never   Substance and Sexual Activity     Alcohol use: No     Drug use: No     Sexual activity: Not on file   Other Topics Concern     Not on file   Social History Narrative     Not on file     Social Determinants of Health     Financial Resource Strain: Not on file   Food Insecurity: Not on file   Transportation Needs: Not on file   Physical Activity: Not on file   Stress: Not on file   Social Connections: Not on file   Intimate Partner Violence: Not on file   Housing Stability: Not on file          Family History:   No family history on file.         Allergies:   No Known Allergies         Medications:     Current Outpatient Medications   Medication Sig Dispense Refill     acetaminophen (TYLENOL) 325 MG tablet Take 325-650 mg by mouth every 6 hours as needed for mild pain       benzonatate (TESSALON) 100 MG capsule Take 1 capsule (100 mg) by mouth 3 times daily as needed for cough 15 capsule 0     cholecalciferol 125 MCG (5000 UT) CAPS        doxylamine (UNISOM) 25 MG TABS tablet Take 1 tablet (25 mg) by mouth nightly as needed for sleep 15 tablet 0     ergocalciferol (ERGOCALCIFEROL) 1.25 MG (10657 UT) capsule daily.       ibuprofen (ADVIL/MOTRIN) 600 MG tablet Take 1 tablet (600 mg) by mouth every 6 hours as needed for moderate pain 30 tablet 0     traZODone (DESYREL) 100 MG tablet Take 1 tablet (100 mg) by mouth At Bedtime 30 tablet 0            Physical Exam:   Blood pressure 118/79, pulse 82, weight 69.9 kg (154 lb), SpO2 97  %.  Wt Readings from Last 6 Encounters:   11/25/22 69.9 kg (154 lb)   10/22/22 72.6 kg (160 lb)   05/18/22 72.6 kg (160 lb)   05/17/22 72.6 kg (160 lb)   03/12/22 72.6 kg (160 lb)   03/01/22 74.7 kg (164 lb 9.6 oz)     Constitutional: well-developed, appearing stated age; cooperative  Eyes: nl EOM, PERRLA, vision, conjunctiva, sclera  ENT: nl external ears, nose, hearing, lips, teeth, gums, throat  No mucous membrane lesions, normal saliva pool  Neck: no mass or thyroid enlargement  Resp: breathing unlabored, lungs CTA  Cor: RRR S1 S2  GI: no ABD mass; mild epigastric tenderness; no HSM, no pulsatile mass palpable in the midline.  Dorsalis pedis pulses symmetrical and full.  Lymph: no cervical, supraclavicular, inguinal or epitrochlear nodes  MS: Normal range of motion in all upper extremity and lower extremity joints.  Skin: no nail pitting, alopecia, rash, nodules or lesions  Neuro: nl cranial nerves, strength, sensation, DTRs.   Psych: nl judgement, orientation, memory, affect.         Data:     @  RHEUM RESULTS Latest Ref Rng & Units 3/6/2021 3/12/2022 3/12/2022   ALBUMIN 3.4 - 5.0 g/dL - 4.1 -   ALT 0 - 70 U/L - 19 -   AST 0 - 45 U/L - 18 -   ALEXANDER INTERPRETATION Negative - - -   CREATININE 0.66 - 1.25 mg/dL 0.87 0.83 -   CRP 0.0 - 8.0 mg/L - - 6.3   GFR ESTIMATE, IF BLACK >60 mL/min/[1.73:m2] >90 - -   GFR ESTIMATE >60 mL/min/1.73m2 >90 >90 -   HEMATOCRIT 40.0 - 53.0 % - 45.7 -   HEMOGLOBIN 13.3 - 17.7 g/dL 15.7 15.5 -   HEPBANG Nonreactive - - -   HCVAB Nonreactive - - -   IGA 84 - 499 mg/dL - - -   IGM 35 - 242 mg/dL - - -   -1,616 mg/dL - - -   WBC 4.0 - 11.0 10e3/uL - 5.5 -   RBC 4.40 - 5.90 10e6/uL - 5.24 -   RDW 10.0 - 15.0 % - 12.4 -   MCHC 31.5 - 36.5 g/dL - 33.9 -   MCV 78 - 100 fL - 87 -   PLATELET COUNT 150 - 450 10e3/uL - 244 -   ESR 0 - 15 mm/hr - 8 -   QUANTIFERON-TB GOLD PLUS RESULT Negative - - -        ,  ,  ,  ,  ,  ,   ALEXANDER interpretation   Date Value Ref Range Status   03/12/2022  Negative Negative Final     Comment:       Negative:              <1:40  Borderline Positive:   1:40 - 1:80  Positive:              >1:80   ,  ,  ,  ,  ,  ,  ,  ,  ,   Hepatitis B Surface Antigen   Date Value Ref Range Status   03/12/2022 Nonreactive Nonreactive Final   ,  ,  ,  ,   Quantiferon-TB Gold Plus   Date Value Ref Range Status   03/13/2022 Positive (A) Negative Final     Comment:     Interferon gamma response to M.tuberculosis antigens was detected,suggesting infection with M.tuberculosis. Positive results in patients at low risk for infection should be interpreted with caution and repeat testing on a new sample should be considered as recommended by the 2017 ATS/IDSA/CDC Clinical Prac  terry Guidelines for Diagnosis of Tuberculosis in Adults and Children      TB1 Ag minus Nil Value   Date Value Ref Range Status   03/13/2022 8.66 IU/mL Final     TB2 Ag minus Nil Value   Date Value Ref Range Status   03/13/2022 8.66 IU/mL Final     Mitogen minus Nil Result   Date Value Ref Range Status   03/13/2022 8.66 IU/mL Final     Nil Result   Date Value Ref Range Status   03/13/2022 1.34 IU/mL Final   ,  ,  ,  ,  ,  ,  ,  ,  ,  ,   Neutrophil Cytoplasmic Antibody   Date Value Ref Range Status   03/12/2022 <1:10 <1:10 Final   ,   Neutrophil Cytoplasmic Antibody Pattern   Date Value Ref Range Status   03/12/2022   Final    The ANCA IFA is <1:10.  No further testing will be performed.   ,  ,  ,  ,  ,  ,  ,   Immunoglobulin G   Date Value Ref Range Status   03/16/2022 1,445 610-1,616 mg/dL Final   03/16/2022 1,445 610-1,616 mg/dL Final     Immunoglobulin A   Date Value Ref Range Status   03/12/2022 45 (L) 84 - 499 mg/dL Final     Immunoglobulin M   Date Value Ref Range Status   03/12/2022 77 35 - 242 mg/dL Final     Narrative & Impression   MRA ABDOMEN WITH 3D AND MULTIPLANAR RECONSTRUCTIONS 4/25/2022 4:50 PM     CLINICAL HISTORY: Aortitis (H).      COMPARISONS: CT 3/12/2022     REFERRING PROVIDER: IRAIS HUNG  ÁNGEL     TECHNIQUE: Coronal T2 HASTE, TRUFISP and T1 3D VIBE PRE and axial  TRUFISP, T1 3D VIBE PRE, and 2DTOF sequences obtained. MIP  reconstructions made from the 2DTOF. Coronal FL3D angio sequences  obtained. Post contrast coronal and axial T1 3D VIBE sequences  obtained. Subtraction reconstructions produced.     3D and multiplanar reconstructions were produced.     FINDINGS: Aortic wall thickening again demonstrated from the level of  the renal arteries to the inferior mesenteric artery. Wall T2 signal  and enhancement. No periaortic fat stranding or T2 signal.     No aortic stenosis or dissection.     Aorta:       Celiac origin: 15 mm       Infrarenal (including thickened wall): 18 mm       Inferior mesenteric origin: 12 mm       Above the bifurcation: 14 mm     Celiac, superior mesenteric, bilateral single renal, and inferior  mesenteric arteries are patent.     Circumaortic left renal artery retroaortic branch crosses behind the  aorta just above the level of the inferior mesenteric artery.     Right arterial and venous and left venous corona mortis were  demonstrated in the previous CT but not included in this study.     Left common iliac vein measures 2.7 mm in diameter between the right  common iliac artery and spine and 7.7 mm laterally for a 65% diameter  stenosis.                                                                      IMPRESSION:  1. Aortitis again demonstrated with enhancing wall thickening from the  renal origins to the inferior mesenteric artery. No periaortic fat  stranding, T2 signal, or enhancement.     2. 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 No thrombus demonstrated.     Reference:  Crispin L, Jeff U, Adrianan S, Korene Z, Petty F. Compression of the  left common iliac vein in asymptomatic subjects and patients with left  iliofemoral deep vein thrombosis. J Vasc Interv Radiol. 2008  Mar;19(3):366-70; quiz 371. doi:  10.1016/j.jvir.2007.09.007. PMID:  42881014.     Compression in excess of 70% can be helpful for identifying possible  underlying May Thurner Syndrome (iliac vein compression syndrome -  IVCS) in patients with a left lower extremity deep vein thrombosis.     HERMAN SUAREZ MD

## 2022-11-21 NOTE — TELEPHONE ENCOUNTER
This RN called Imaging Scheduling and re-scheduled him into the next available spot on 12/10 at 1330.  Follow-up call to patient to discuss re-scheduling of MRI with #[id number].  Needed to leave message. Message left with new MRI time/date and to call Imaging Scheduling if this does not work for him.    NAOMI VaughnN, RN  RN Care Coordinator Rheumatology

## 2022-11-25 ENCOUNTER — OFFICE VISIT (OUTPATIENT)
Dept: RHEUMATOLOGY | Facility: CLINIC | Age: 27
End: 2022-11-25
Attending: INTERNAL MEDICINE
Payer: COMMERCIAL

## 2022-11-25 VITALS
HEART RATE: 82 BPM | BODY MASS INDEX: 23.42 KG/M2 | OXYGEN SATURATION: 97 % | SYSTOLIC BLOOD PRESSURE: 118 MMHG | WEIGHT: 154 LBS | DIASTOLIC BLOOD PRESSURE: 79 MMHG

## 2022-11-25 DIAGNOSIS — R10.13 EPIGASTRIC PAIN: Primary | ICD-10-CM

## 2022-11-25 PROCEDURE — 99214 OFFICE O/P EST MOD 30 MIN: CPT | Performed by: INTERNAL MEDICINE

## 2022-11-25 NOTE — LETTER
11/25/2022       RE: Stephon Wolfe  [address]  [facility] [address]     Dear Colleague,    Thank you for referring your patient, Stephon Wolfe, to the [facility] RHEUMATOLOGY [facility]INIC [place] [facility]. Please see a copy of my visit note below.    Rheumatology Clinic Visit  [facility]  Omid Taveras M.D.     Stephon Wolfe MRN# [id number]   YOB: 1995 Age: 26 year old   Date of Visit: 11/25/2022  Primary care provider: [facility]          Assessment and Plan:     # Thickened abdominal aorta  # History of bipolar disorder  # History of acute gastroenteritis/dysentery  # History of latent TB, sufficiently treated by report  # IgA deficiency, mild:    Patient relates squeezing epigastric pain, self-limited, occurring several times weekly, relieved with eating.  Physical exam shows mild epigastric tenderness without rebound.  Cardiovascular exam is normal  Data:  Lab work on October 22, 2022 showed comprehensive metabolic panel, TSH, CBC all normal.    Imaging: MRA abdomen April 25, 2022 showed enhancing (mild, endothelial enhancement on T2 weighted scans) aortic wall thickening from the renal origins to the inferior mesenteric artery.  No periaortic fat stranding, T2 signal, or enhancement.  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  Repeat MRI of the abdomen is scheduled for December 10, 2022.    Discussion: There continue to be no systemic inflammatory or worrisome symptoms or signs associated with a incidental radiographic finding of aortic wall thickening, noted on CT scan in March 2022, and MRA abdomen in April 2022.  In clinic today, the patient's apparent vigorous good health argues strongly against the presence of untreated or progressive systemic inflammatory disease such as vasculitis, malignancy, or chronic infection  like TB.  Most likely explanation for the imaging abnormality remains old/healed aortic inflammation, that may have occurred following long-ago infection.  No treatment for infection, and no treatment for inflammation is warranted presently.  I also recommend repeating MR with angiography of the abdomen (scheduled in 2 weeks) to assess both modest external iliac vein reduced diameter, and for size and character of the aortic narrowing.  There is no need for restriction on physical activity, diet, or travel at this time. recommend monitoring for development of systemic inflammatory disease symptoms (fever, weight loss, abdominal pain, lower extremity pain with exertion).    # History of tuberculosis, latent, treated in 2006 per patient report, positive QuantiFERON gold test March 2022.  Most likely, persistently positive tuberculosis test in March 2022 is due to immune system reactivity developed years ago after TB exposure.  There is no evidence of active TB now. Patient plans followup with infectious disease.    #Epigastric pain: Association with eating points towards gastric or esophageal pain generator.  I recommend trial of omeprazole 20 mg daily for 30 days.  Follow-up with primary care if no improvement.      We reviewed the following plan:    1.  Repeat imaging (MRI) of the aorta  2.  No activity restrictions; Followup with primary care doctor for chest pain and for leg/knee pain.  3.  Report recurrence of abdominal pain, or of leg/thigh pain that occurs with exercise.  4.  No treatment needed for aorta abnormality today.  5. Trial of omeprazole 20 mg daily for abdominal pain    RTC 12 mos  Omid Taveras MD  Staff Rheumatologist, [facility]           History of Present Illness:   Stephon Wolfe presents for follow-up of abnormal aorta radiographic findings. Last seen in May 2022.  Impression at that time was of aortic wall thickening noted on CT scan in March 2022 and MRA of the abdomen in April 2022.   "Patient also had history of positive TB test, treated in 2006.  Plan was for watchful waiting, and repeat MRI of the aorta in 6 months.    Interval history November 25, 2022    [place] .  He reports enjoying vigorous good health while overseas.  He reports \"heart pain\", like \"something heavy\" on the L side of chest, occurs  When sleeping, sometimes when walking. No SOB, no nausea , no HA. Sensation goes away in 5-30 minutes.  He had cough and fever 2 weeks ago; symptoms are improved.  He notes L > R knee pain; pain is severe enough that he has not played sports.  Otherwise, he feels well.  Good energy level, good appetite, and no restriction in activities based on abdominal pain.    History, May 17, 2022, post hospital discharge.  Patient was encountered by rheumatology in the emergency room in mid March 2022.  Impression at that time was of an acute episode of gastroenteritis/dysentery, and a coincidental abdominal CT finding of aortic wall thickening:    \"...Syndrome of acute gastroenteritis/dysentery is associated with segmental abdominal aortic wall thickening, but no correlates of systemic rheumatic disease, toxin, infection, or even of systemic inflammation are present. Healed/old aortic inflammatory disease is highest on the differential diagnosis of the aortic abnormalities seen on CT scan. MRI with contrast will be more sensitive to inflammatory changes in the aortic endothelium. Therapy may be withheld pending laboratory workup for infection, IgG4-related disease, and while awaiting followup imaging.\"    Patient was hospitalized for observation for 5 days and discharged on March 17, 2022.  Discharge summary listed following benign hospital course:    \" ...26 year old male with a past medical history significant for prediabetes, tension headaches, back pain, hip and knee pain, and bipolar disorder admitted from the ED on 3/12/2022 for emesis and abdominal pain. 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 MRA abdomen confirmed abdominal aortitis, while MRA chest indicated normal physiology with no inflammation in the proximal aorta/subclavian/carotic arteries. Rheumatology consulted and overall work up was negative, except patient found to be partially IgA deficient. Broad ID workup: TB Quantiferon positive for latent TB, and Beta D Glucan also positive. Fungal blood culture pending, and ID will follow up outpatient with results. Suspicion for fungal infection very low, since patient blood cultures were negative. Aortitis DDx includes Takayasu arteritis vs. Aortitis secondary to TB vs. Idiopathic aortitis. ID suggesting patient be treated for latent TB, however, he reports prior treatment (around 2006). Patient and family still searching for documentation confirming TB treatment though discussed with ID who feels no need to repeat treatment and that current evidence for treatment is sufficient...\"    Visit conducted through an .    Today, the patient states that he is feeling well.  He has had no recurrence of the abdominal pain that brought him into the emergency room in March.  He has no fevers chills sweats weight loss, malaise abdominal pain, change in bowel habits, skin rash, joint pain, numbness weakness or tingling in arms or legs, cough, shortness of breath, or back pain.    He has not been engaging in vigorous physical exercise, because he understood that he should restrict physical activity while awaiting follow-up of the aortic abnormality.  He reports today that he is eager to resume playing soccer, and wants to know that it will be safe for him to undertake a planned 3-month trip to [facility] starting in late May 2022.           Review of Systems:     Constitutional: negative  Skin: negative  Eyes: negative  Ears/Nose/Throat: negative  Respiratory: HPI  Cardiovascular: HPI  Gastrointestinal: 5 " days ago, he had squeezing pain for 2 days in upper abdomen. No abd pain prior.  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  No past surgical history on file.         Social History:     Social History     Socioeconomic History     Marital status: Single     Spouse name: Not on file     Number of children: Not on file     Years of education: Not on file     Highest education level: Not on file   Occupational History     Not on file   Tobacco Use     Smoking status: Never     Smokeless tobacco: Never   Substance and Sexual Activity     Alcohol use: No     Drug use: No     Sexual activity: Not on file   Other Topics Concern     Not on file   Social History Narrative     Not on file     Social Determinants of Health     Financial Resource Strain: Not on file   Food Insecurity: Not on file   Transportation Needs: Not on file   Physical Activity: Not on file   Stress: Not on file   Social Connections: Not on file   Intimate Partner Violence: Not on file   Housing Stability: Not on file          Family History:   No family history on file.         Allergies:   No Known Allergies         Medications:     Current Outpatient Medications   Medication Sig Dispense Refill     acetaminophen (TYLENOL) 325 MG tablet Take 325-650 mg by mouth every 6 hours as needed for mild pain       benzonatate (TESSALON) 100 MG capsule Take 1 capsule (100 mg) by mouth 3 times daily as needed for cough 15 capsule 0     cholecalciferol 125 MCG (5000 UT) CAPS        doxylamine (UNISOM) 25 MG TABS tablet Take 1 tablet (25 mg) by mouth nightly as needed for sleep 15 tablet 0     ergocalciferol (ERGOCALCIFEROL) 1.25 MG (34464 UT)  capsule daily.       ibuprofen (ADVIL/MOTRIN) 600 MG tablet Take 1 tablet (600 mg) by mouth every 6 hours as needed for moderate pain 30 tablet 0     traZODone (DESYREL) 100 MG tablet Take 1 tablet (100 mg) by mouth At Bedtime 30 tablet 0            Physical Exam:   Blood pressure 118/79, pulse 82, weight 69.9 kg (154 lb), SpO2 97 %.  Wt Readings from Last 6 Encounters:   11/25/22 69.9 kg (154 lb)   10/22/22 72.6 kg (160 lb)   05/18/22 72.6 kg (160 lb)   05/17/22 72.6 kg (160 lb)   03/12/22 72.6 kg (160 lb)   03/01/22 74.7 kg (164 lb 9.6 oz)     Constitutional: well-developed, appearing stated age; cooperative  Eyes: nl EOM, PERRLA, vision, conjunctiva, sclera  ENT: nl external ears, nose, hearing, lips, teeth, gums, throat  No mucous membrane lesions, normal saliva pool  Neck: no mass or thyroid enlargement  Resp: breathing unlabored, lungs CTA  Cor: RRR S1 S2  GI: no ABD mass; mild epigastric tenderness; no HSM, no pulsatile mass palpable in the midline.  Dorsalis pedis pulses symmetrical and full.  Lymph: no cervical, supraclavicular, inguinal or epitrochlear nodes  MS: Normal range of motion in all upper extremity and lower extremity joints.  Skin: no nail pitting, alopecia, rash, nodules or lesions  Neuro: nl cranial nerves, strength, sensation, DTRs.   Psych: nl judgement, orientation, memory, affect.         Data:     @  RHEUM RESULTS Latest Ref Rng & Units 3/6/2021 3/12/2022 3/12/2022   ALBUMIN 3.4 - 5.0 g/dL - 4.1 -   ALT 0 - 70 U/L - 19 -   AST 0 - 45 U/L - 18 -   ALEXANDER INTERPRETATION Negative - - -   CREATININE 0.66 - 1.25 mg/dL 0.87 0.83 -   CRP 0.0 - 8.0 mg/L - - 6.3   GFR ESTIMATE, IF BLACK >60 mL/min/[1.73:m2] >90 - -   GFR ESTIMATE >60 mL/min/1.73m2 >90 >90 -   HEMATOCRIT 40.0 - 53.0 % - 45.7 -   HEMOGLOBIN 13.3 - 17.7 g/dL 15.7 15.5 -   HEPBANG Nonreactive - - -   HCVAB Nonreactive - - -   IGA 84 - 499 mg/dL - - -   IGM 35 - 242 mg/dL - - -   -1,616 mg/dL - - -   WBC 4.0 - 11.0 10e3/uL - 5.5 -    RBC 4.40 - 5.90 10e6/uL - 5.24 -   RDW 10.0 - 15.0 % - 12.4 -   MCHC 31.5 - 36.5 g/dL - 33.9 -   MCV 78 - 100 fL - 87 -   PLATELET COUNT 150 - 450 10e3/uL - 244 -   ESR 0 - 15 mm/hr - 8 -   QUANTIFERON-TB GOLD PLUS RESULT Negative - - -        ,  ,  ,  ,  ,  ,   ALEXANDER interpretation   Date Value Ref Range Status   03/12/2022 Negative Negative Final     Comment:       Negative:              <1:40  Borderline Positive:   1:40 - 1:80  Positive:              >1:80   ,  ,  ,  ,  ,  ,  ,  ,  ,   Hepatitis B Surface Antigen   Date Value Ref Range Status   03/12/2022 Nonreactive Nonreactive Final   ,  ,  ,  ,   Quantiferon-TB Gold Plus   Date Value Ref Range Status   03/13/2022 Positive (A) Negative Final     Comment:     Interferon gamma response to M.tuberculosis antigens was detected,suggesting infection with M.tuberculosis. Positive results in patients at low risk for infection should be interpreted with caution and repeat testing on a new sample should be considered as recommended by the 2017 ATS/IDSA/CDC Clinical Prac  terry Guidelines for Diagnosis of Tuberculosis in Adults and Children      TB1 Ag minus Nil Value   Date Value Ref Range Status   03/13/2022 8.66 IU/mL Final     TB2 Ag minus Nil Value   Date Value Ref Range Status   03/13/2022 8.66 IU/mL Final     Mitogen minus Nil Result   Date Value Ref Range Status   03/13/2022 8.66 IU/mL Final     Nil Result   Date Value Ref Range Status   03/13/2022 1.34 IU/mL Final   ,  ,  ,  ,  ,  ,  ,  ,  ,  ,   Neutrophil Cytoplasmic Antibody   Date Value Ref Range Status   03/12/2022 <1:10 <1:10 Final   ,   Neutrophil Cytoplasmic Antibody Pattern   Date Value Ref Range Status   03/12/2022   Final    The ANCA IFA is <1:10.  No further testing will be performed.   ,  ,  ,  ,  ,  ,  ,   Immunoglobulin G   Date Value Ref Range Status   03/16/2022 1,445 610-1,616 mg/dL Final   03/16/2022 1,445 610-1,616 mg/dL Final     Immunoglobulin A   Date Value Ref Range Status   03/12/2022 45  (L) 84 - 499 mg/dL Final     Immunoglobulin M   Date Value Ref Range Status   03/12/2022 77 35 - 242 mg/dL Final     Narrative & Impression   MRA ABDOMEN WITH 3D AND MULTIPLANAR RECONSTRUCTIONS 4/25/2022 4:50 PM     CLINICAL HISTORY: Aortitis (H).      COMPARISONS: CT 3/12/2022     REFERRING PROVIDER: IRAIS NEAL     TECHNIQUE: Coronal T2 HASTE, TRUFISP and T1 3D VIBE PRE and axial  TRUFISP, T1 3D VIBE PRE, and 2DTOF sequences obtained. MIP  reconstructions made from the 2DTOF. Coronal FL3D angio sequences  obtained. Post contrast coronal and axial T1 3D VIBE sequences  obtained. Subtraction reconstructions produced.     3D and multiplanar reconstructions were produced.     FINDINGS: Aortic wall thickening again demonstrated from the level of  the renal arteries to the inferior mesenteric artery. Wall T2 signal  and enhancement. No periaortic fat stranding or T2 signal.     No aortic stenosis or dissection.     Aorta:       Celiac origin: 15 mm       Infrarenal (including thickened wall): 18 mm       Inferior mesenteric origin: 12 mm       Above the bifurcation: 14 mm     Celiac, superior mesenteric, bilateral single renal, and inferior  mesenteric arteries are patent.     Circumaortic left renal artery retroaortic branch crosses behind the  aorta just above the level of the inferior mesenteric artery.     Right arterial and venous and left venous corona mortis were  demonstrated in the previous CT but not included in this study.     Left common iliac vein measures 2.7 mm in diameter between the right  common iliac artery and spine and 7.7 mm laterally for a 65% diameter  stenosis.                                                                      IMPRESSION:  1. Aortitis again demonstrated with enhancing wall thickening from the  renal origins to the inferior mesenteric artery. No periaortic fat  stranding, T2 signal, or enhancement.     2. Left common iliac vein 65% diameter stenosis between the  right  common iliac artery and spine. No thrombus demonstrated.     Reference:  Ogchau L, Oztresan U, Ulusan S, Korene Z, Petty F. Compression of the  left common iliac vein in asymptomatic subjects and patients with left  iliofemoral deep vein thrombosis. J Vasc Interv Radiol. 2008  Mar;19(3):366-70; quiz 371. doi: 10.1016/j.jvir.2007.09.007. PMID:  71602872.     Compression in excess of 70% can be helpful for identifying possible  underlying May Thurner Syndrome (iliac vein compression syndrome -  IVCS) in patients with a left lower extremity deep vein thrombosis.     HERMAN SUAREZ MD          Again, thank you for allowing me to participate in the care of your patient.      Sincerely,    Omid Taveras MD

## 2022-11-25 NOTE — PATIENT INSTRUCTIONS
Diagnosis:  1.  Aortic wall thickening, noted on CT scan in March 2022, and MR abdomen in April 2022:    Most likely explanation for the abnormality is old/healed aortic inflammation, that may have occurred following previous infection.  No treatment for infection, and no treatment for inflammation is warranted at this time.  2.  History of tuberculosis, latent, treated.  Most likely, persistently positive tuberculosis test in March 2022 is due to immune system reactivity to old TB.  There is no evidence of active TB now.  In order to be sure that TB was adequately treated back in 2006, I recommend review of records from that treatment.  3. Chest/abdominal pain: possible gastritis.    Plan:  1.  Repeat imaging (MRI) of the aorta  2.  No activity restrictions; Followup with primary care doctor for chest pain and for leg/knee pain.  3.  Report recurrence of abdominal pain, or of leg/thigh pain that occurs with exercise.  4.  No treatment needed for aorta abnormality today.  5. Trial of omeprazole 20 mg daily for abdominal pain

## 2022-11-26 NOTE — ED TRIAGE NOTES
Pt having insomnia.  Pt describes playing soccer twice a day and having difficulty falling asleep after playing from body aches.  
Blanche Stewart PA :  PT evaluated by intake physician. HPI/PE/ROS as noted above. Will follow up plan per intake physician  strep neg, +FLU A  tylenol/motrin f/u pcp

## 2022-12-20 ENCOUNTER — TELEPHONE (OUTPATIENT)
Dept: RHEUMATOLOGY | Facility: CLINIC | Age: 27
End: 2022-12-20

## 2022-12-20 DIAGNOSIS — I77.6 AORTITIS (H): Primary | ICD-10-CM

## 2022-12-20 NOTE — TELEPHONE ENCOUNTER
[facility] Call Center    Phone Message    May a detailed message be left on voicemail: yes     Reason for Call: Order(s): Other:   Reason for requested: MRI  Date needed: ASAP  Provider name: Dr. Taveras     Pt was originally schedule for 12/10/22 but apt got cancelled. Radiology needs orders for MRI       Action Taken: Other: [facility] Rheum    Travel Screening: Not Applicable

## 2022-12-20 NOTE — TELEPHONE ENCOUNTER
Previous MRI order , re-entered new order.  Call to patient with  ID#[id number] to confirm that a new order has been placed.  Provided patient with phone number for MRI scheduling.  All questions answered.    NAOMI VaughnN, RN  RN Care Coordinator Rheumatology

## 2022-12-26 ENCOUNTER — HOSPITAL ENCOUNTER (EMERGENCY)
Facility: CLINIC | Age: 27
Discharge: HOME | End: 2022-12-26
Attending: EMERGENCY MEDICINE | Admitting: EMERGENCY MEDICINE
Payer: COMMERCIAL

## 2022-12-26 VITALS
DIASTOLIC BLOOD PRESSURE: 83 MMHG | HEART RATE: 68 BPM | SYSTOLIC BLOOD PRESSURE: 115 MMHG | RESPIRATION RATE: 14 BRPM | OXYGEN SATURATION: 100 % | TEMPERATURE: 98.6 F

## 2022-12-26 DIAGNOSIS — R51.9 NONINTRACTABLE EPISODIC HEADACHE, UNSPECIFIED HEADACHE TYPE: ICD-10-CM

## 2022-12-26 PROCEDURE — 87637 SARSCOV2&INF A&B&RSV AMP PRB: CPT | Performed by: EMERGENCY MEDICINE

## 2022-12-26 PROCEDURE — 99284 EMERGENCY DEPT VISIT MOD MDM: CPT | Performed by: EMERGENCY MEDICINE

## 2022-12-26 PROCEDURE — 250N000011 HC RX IP 250 OP 636: Performed by: EMERGENCY MEDICINE

## 2022-12-26 PROCEDURE — C9803 HOPD COVID-19 SPEC COLLECT: HCPCS | Performed by: EMERGENCY MEDICINE

## 2022-12-26 PROCEDURE — 250N000013 HC RX MED GY IP 250 OP 250 PS 637: Performed by: EMERGENCY MEDICINE

## 2022-12-26 PROCEDURE — 96372 THER/PROPH/DIAG INJ SC/IM: CPT | Performed by: EMERGENCY MEDICINE

## 2022-12-26 RX ORDER — LANOLIN ALCOHOL/MO/W.PET/CERES
3 CREAM (GRAM) TOPICAL
Qty: 30 TABLET | Refills: 1 | Status: SHIPPED | OUTPATIENT
Start: 2022-12-26

## 2022-12-26 RX ORDER — KETOROLAC TROMETHAMINE 30 MG/ML
30 INJECTION, SOLUTION INTRAMUSCULAR; INTRAVENOUS ONCE
Status: COMPLETED | OUTPATIENT
Start: 2022-12-26 | End: 2022-12-26

## 2022-12-26 RX ORDER — LANOLIN ALCOHOL/MO/W.PET/CERES
3 CREAM (GRAM) TOPICAL ONCE
Status: COMPLETED | OUTPATIENT
Start: 2022-12-26 | End: 2022-12-26

## 2022-12-26 RX ADMIN — MELATONIN TAB 3 MG 3 MG: 3 TAB at 00:55

## 2022-12-26 RX ADMIN — KETOROLAC TROMETHAMINE 30 MG: 30 INJECTION, SOLUTION INTRAMUSCULAR; INTRAVENOUS at 00:55

## 2022-12-26 NOTE — ED PROVIDER NOTES
[facility] EMERGENCY DEPARTMENT ([facility])     December 26, 2022    ED Provider Note  [facility]      History     Chief Complaint   Patient presents with     Headache     Headache  and unable to sleep. Took Tylenol and Ibuprofen yesterday and did not help.       HPI  Stephon Wolfe is a 27 year old male with a past medical history significant for aortitis, mild intermittent asthma, GERD, bipolar 1 disorder who presents to the Emergency Department for evaluation of headache. Patient reports headache for the past 10 days. He states headache radiates to the back and eye. He states he has past history of headache.  He states he gets headaches when he is unable to sleep well. He states he takes melatonin to help with sleep. He also reports back and neck pain. Denies trauma to the head. Denies hallucination. Denies fever, vision changes, numbness, cough or sore throat. Headache gradual in onset, not worst of life.     Past Medical History  No past medical history on file.  No past surgical history on file.  acetaminophen (TYLENOL) 325 MG tablet  ibuprofen (ADVIL/MOTRIN) 600 MG tablet  melatonin 3 MG tablet  benzonatate (TESSALON) 100 MG capsule  cholecalciferol 125 MCG (5000 UT) CAPS  doxylamine (UNISOM) 25 MG TABS tablet  ergocalciferol (ERGOCALCIFEROL) 1.25 MG (56327 UT) capsule  omeprazole (PRILOSEC) 20 MG DR capsule  traZODone (DESYREL) 100 MG tablet      No Known Allergies  Family History  No family history on file.  Social History   Social History     Tobacco Use     Smoking status: Never     Smokeless tobacco: Never   Substance Use Topics     Alcohol use: No     Drug use: No      Past medical history, past surgical history, medications, allergies, family history, and social history were reviewed with the patient. No additional pertinent items.       Review of Systems  A complete review of systems was performed with pertinent positives and negatives noted in the HPI, and all  other systems negative.    Physical Exam   BP: 102/72  Pulse: 69  Temp: 98.6  F (37  C)  Resp: 16  SpO2: 99 %  Physical Exam  Physical Exam   Constitutional: oriented to person, place, and time. appears well-developed and well-nourished.   HENT:   Head: Normocephalic and atraumatic. No tenderness over the cranium.  Neck: Normal range of motion. No tenderness over the C-spine.  Pulmonary/Chest: Effort normal. No respiratory distress.   Cardiac: No murmurs, rubs, gallops. RRR.  Abdominal: Abdomen soft, nontender, nondistended. No rebound tenderness.  MSK: Long bones without deformity or evidence of trauma  Neurological: alert and oriented to person, place, and time. Gait stable.  Sensation intact in all extremities.  Cranial nerves II through XII normal.  Strength symmetric.  No focal defects.  No nuchal rigidity.  Skin: Skin is warm and dry.   Psychiatric:  normal mood and affect.  behavior is normal. Thought content normal.     ED Course      Procedures        12:44 AM  The patient was seen and examined by Saul Ryan in Room ED07.          No results found for any visits on 12/26/22.  Medications - No data to display     Assessments & Plan (with Medical Decision Making)   MDM  Patient presenting with headache.  There are no red flags to suggest meningitis or encephalitis.  No trauma, headache is not sudden onset, very unlikely intracranial hemorrhage such as subarachnoid, epidural or subdural.  Do not feel imaging is necessary today.  His headaches are typical when he is not sleeping.  He has difficulty transitioning from working nights.  Melatonin has helped in the past, he is out of this and would like a prescription which I provided.  He also said Toradol shots worked well for him, this is provided as well.  Patient otherwise appears well and nontoxic.  Recommended following up with his primary care provider for his ongoing headaches.  Patient agrees with plan.  He has no psychiatric complaints today    I  have reviewed the nursing notes. I have reviewed the findings, diagnosis, plan and need for follow up with the patient.    New Prescriptions    No medications on file       Final diagnoses:   Nonintractable episodic headache, unspecified headache type   I, Avis Means, am serving as a trained medical scribe to document services personally performed by Onel Ryan MD, based on the provider's statements to me.      I,  Onel Ryan MD, was physically present and have reviewed and verified the accuracy of this note documented by Avis Means.     --  Saul Ryan MD  [facility] EMERGENCY DEPARTMENT  12/26/2022     Saul Ryan MD  12/26/22 0112

## 2022-12-26 NOTE — ED TRIAGE NOTES
Triage Assessment     Row Name 12/26/22 0015       Triage Assessment (Adult)    Airway WDL WDL

## 2022-12-26 NOTE — DISCHARGE INSTRUCTIONS
Please make an appointment to follow [facility] with [facility] (phone: [phone]) in 2-3 days if not improving.    Return to the emergency department if you develop worsening symptoms, fevers or if you have any further concerns    If Stephon has discomfort from fever or other pain, he can have:  Acetaminophen (Tylenol) every 6 hours as needed. His dose is:    2 regular strength tabs (650 mg)                                     (43.2+ kg/96+ lb)    NOTE: If your acetaminophen (Tylenol) came with a dropper marked with 0.4 and 0.8 ml, call us ([phone]) or check with your doctor about the dose before using it.     AND/OR      Ibuprofen (Advil, Motrin) every 6 hours as needed. His/her dose is:    2 regular strength tabs (400 mg)                                                                         (40-60 kg/ lb)

## 2022-12-26 NOTE — RESULT ENCOUNTER NOTE
Negative for Influenza A, Influenza B, RSV and Covid19.  Patient will receive the Covid19 result via [employer] and a letter will be sent via [employer] (if active) or via the mail

## 2023-01-17 ENCOUNTER — HOSPITAL ENCOUNTER (OUTPATIENT)
Dept: MRI IMAGING | Facility: CLINIC | Age: 28
Discharge: HOME | End: 2023-01-17
Attending: INTERNAL MEDICINE | Admitting: INTERNAL MEDICINE
Payer: COMMERCIAL

## 2023-01-17 DIAGNOSIS — I77.6 AORTITIS (H): ICD-10-CM

## 2023-01-17 PROCEDURE — 255N000002 HC RX 255 OP 636: Performed by: INTERNAL MEDICINE

## 2023-01-17 PROCEDURE — A9585 GADOBUTROL INJECTION: HCPCS | Performed by: INTERNAL MEDICINE

## 2023-01-17 PROCEDURE — 74185 MRA ABD W OR W/O CNTRST: CPT

## 2023-01-17 PROCEDURE — 74185 MRA ABD W OR W/O CNTRST: CPT | Mod: 26 | Performed by: RADIOLOGY

## 2023-01-17 RX ORDER — GADOBUTROL 604.72 MG/ML
7 INJECTION INTRAVENOUS ONCE
Status: COMPLETED | OUTPATIENT
Start: 2023-01-17 | End: 2023-01-17

## 2023-01-17 RX ADMIN — GADOBUTROL 7.5 ML: 604.72 INJECTION INTRAVENOUS at 16:35

## 2023-01-18 ENCOUNTER — TELEPHONE (OUTPATIENT)
Dept: RHEUMATOLOGY | Facility: CLINIC | Age: 28
End: 2023-01-18
Payer: COMMERCIAL

## 2023-01-18 NOTE — TELEPHONE ENCOUNTER
Message left for patient with interpretor #[id number].  Instructed patient to call back to receive updated message from Dr. Taveras.    Deandra Lenz, NAOMIN, RN  RN Care Coordinator Rheumatology

## 2023-01-19 NOTE — TELEPHONE ENCOUNTER
Additional message left for patient to call this RN back to review update from Dr. Taveras.    NAOMI VaughnN, RN  RN Care Coordinator Rheumatology

## 2023-01-19 NOTE — TELEPHONE ENCOUNTER
Patient returned call to this RN, denied need for .  This RN read patient the update from Dr. Taveras regarding his MRI report-    MRI shows mostly stability of the aortic thickening noted in 4-2022. This is reassuring as there is no progression of blood vessel abnormality; I recommend repeating MRA in 1 year, as long as abdominal symptoms are not worsening.     Omid Taveras MD  Staff Rheumatologist, [facility]    Patient verbalized understanding.    EVIN Vaughn, RN  RN Care Coordinator Rheumatology

## 2023-01-27 ENCOUNTER — APPOINTMENT (OUTPATIENT)
Dept: GENERAL RADIOLOGY | Facility: CLINIC | Age: 28
End: 2023-01-27
Attending: INTERNAL MEDICINE
Payer: COMMERCIAL

## 2023-01-27 ENCOUNTER — HOSPITAL ENCOUNTER (EMERGENCY)
Facility: CLINIC | Age: 28
Discharge: HOME | End: 2023-01-27
Attending: INTERNAL MEDICINE | Admitting: INTERNAL MEDICINE
Payer: COMMERCIAL

## 2023-01-27 VITALS
OXYGEN SATURATION: 99 % | DIASTOLIC BLOOD PRESSURE: 73 MMHG | RESPIRATION RATE: 18 BRPM | SYSTOLIC BLOOD PRESSURE: 109 MMHG | TEMPERATURE: 97.5 F | HEART RATE: 70 BPM

## 2023-01-27 DIAGNOSIS — M25.511 ACUTE PAIN OF RIGHT SHOULDER: ICD-10-CM

## 2023-01-27 DIAGNOSIS — G44.209 ACUTE NON INTRACTABLE TENSION-TYPE HEADACHE: ICD-10-CM

## 2023-01-27 PROCEDURE — 99284 EMERGENCY DEPT VISIT MOD MDM: CPT | Performed by: INTERNAL MEDICINE

## 2023-01-27 PROCEDURE — 71046 X-RAY EXAM CHEST 2 VIEWS: CPT

## 2023-01-27 PROCEDURE — 250N000011 HC RX IP 250 OP 636: Performed by: INTERNAL MEDICINE

## 2023-01-27 PROCEDURE — 73030 X-RAY EXAM OF SHOULDER: CPT | Mod: RT

## 2023-01-27 PROCEDURE — 99284 EMERGENCY DEPT VISIT MOD MDM: CPT | Mod: 25 | Performed by: INTERNAL MEDICINE

## 2023-01-27 PROCEDURE — 96372 THER/PROPH/DIAG INJ SC/IM: CPT | Performed by: INTERNAL MEDICINE

## 2023-01-27 RX ORDER — ZOLPIDEM TARTRATE 5 MG/1
5 TABLET ORAL
Qty: 10 TABLET | Refills: 0 | Status: SHIPPED | OUTPATIENT
Start: 2023-01-27

## 2023-01-27 RX ORDER — NAPROXEN 500 MG/1
500 TABLET ORAL 2 TIMES DAILY PRN
Qty: 30 TABLET | Refills: 0 | Status: SHIPPED | OUTPATIENT
Start: 2023-01-27

## 2023-01-27 RX ORDER — KETOROLAC TROMETHAMINE 30 MG/ML
30 INJECTION, SOLUTION INTRAMUSCULAR; INTRAVENOUS ONCE
Status: COMPLETED | OUTPATIENT
Start: 2023-01-27 | End: 2023-01-27

## 2023-01-27 RX ADMIN — KETOROLAC TROMETHAMINE 30 MG: 30 INJECTION, SOLUTION INTRAMUSCULAR; INTRAVENOUS at 07:36

## 2023-01-27 ASSESSMENT — ACTIVITIES OF DAILY LIVING (ADL): ADLS_ACUITY_SCORE: 33

## 2023-02-25 ENCOUNTER — APPOINTMENT (OUTPATIENT)
Dept: GENERAL RADIOLOGY | Facility: CLINIC | Age: 28
End: 2023-02-25
Attending: EMERGENCY MEDICINE
Payer: COMMERCIAL

## 2023-02-25 ENCOUNTER — HOSPITAL ENCOUNTER (EMERGENCY)
Facility: CLINIC | Age: 28
Discharge: HOME | End: 2023-02-26
Attending: EMERGENCY MEDICINE | Admitting: EMERGENCY MEDICINE
Payer: COMMERCIAL

## 2023-02-25 DIAGNOSIS — R07.9 CHEST PAIN, UNSPECIFIED TYPE: ICD-10-CM

## 2023-02-25 LAB
ANION GAP SERPL CALCULATED.3IONS-SCNC: 11 MMOL/L (ref 7–15)
BASOPHILS # BLD AUTO: 0 10E3/UL (ref 0–0.2)
BASOPHILS NFR BLD AUTO: 1 %
BUN SERPL-MCNC: 17.3 MG/DL (ref 6–20)
CALCIUM SERPL-MCNC: 9.8 MG/DL (ref 8.6–10)
CHLORIDE SERPL-SCNC: 99 MMOL/L (ref 98–107)
CREAT SERPL-MCNC: 0.95 MG/DL (ref 0.67–1.17)
DEPRECATED HCO3 PLAS-SCNC: 28 MMOL/L (ref 22–29)
EOSINOPHIL # BLD AUTO: 0.1 10E3/UL (ref 0–0.7)
EOSINOPHIL NFR BLD AUTO: 1 %
ERYTHROCYTE [DISTWIDTH] IN BLOOD BY AUTOMATED COUNT: 12.2 % (ref 10–15)
GFR SERPL CREATININE-BSD FRML MDRD: >90 ML/MIN/1.73M2
GLUCOSE SERPL-MCNC: 87 MG/DL (ref 70–99)
HCT VFR BLD AUTO: 42.8 % (ref 40–53)
HGB BLD-MCNC: 14.2 G/DL (ref 13.3–17.7)
IMM GRANULOCYTES # BLD: 0 10E3/UL
IMM GRANULOCYTES NFR BLD: 0 %
LYMPHOCYTES # BLD AUTO: 2.7 10E3/UL (ref 0.8–5.3)
LYMPHOCYTES NFR BLD AUTO: 35 %
MCH RBC QN AUTO: 28.9 PG (ref 26.5–33)
MCHC RBC AUTO-ENTMCNC: 33.2 G/DL (ref 31.5–36.5)
MCV RBC AUTO: 87 FL (ref 78–100)
MONOCYTES # BLD AUTO: 0.8 10E3/UL (ref 0–1.3)
MONOCYTES NFR BLD AUTO: 11 %
NEUTROPHILS # BLD AUTO: 4 10E3/UL (ref 1.6–8.3)
NEUTROPHILS NFR BLD AUTO: 52 %
NRBC # BLD AUTO: 0 10E3/UL
NRBC BLD AUTO-RTO: 0 /100
PLATELET # BLD AUTO: 222 10E3/UL (ref 150–450)
POTASSIUM SERPL-SCNC: 3.8 MMOL/L (ref 3.4–5.3)
RBC # BLD AUTO: 4.92 10E6/UL (ref 4.4–5.9)
SODIUM SERPL-SCNC: 138 MMOL/L (ref 136–145)
TROPONIN T SERPL HS-MCNC: <6 NG/L
WBC # BLD AUTO: 7.7 10E3/UL (ref 4–11)

## 2023-02-25 PROCEDURE — 250N000011 HC RX IP 250 OP 636: Performed by: EMERGENCY MEDICINE

## 2023-02-25 PROCEDURE — 85025 COMPLETE CBC W/AUTO DIFF WBC: CPT | Performed by: EMERGENCY MEDICINE

## 2023-02-25 PROCEDURE — 96374 THER/PROPH/DIAG INJ IV PUSH: CPT | Performed by: EMERGENCY MEDICINE

## 2023-02-25 PROCEDURE — 71046 X-RAY EXAM CHEST 2 VIEWS: CPT

## 2023-02-25 PROCEDURE — 84484 ASSAY OF TROPONIN QUANT: CPT | Performed by: EMERGENCY MEDICINE

## 2023-02-25 PROCEDURE — 250N000013 HC RX MED GY IP 250 OP 250 PS 637: Performed by: EMERGENCY MEDICINE

## 2023-02-25 PROCEDURE — 99285 EMERGENCY DEPT VISIT HI MDM: CPT | Mod: 25 | Performed by: EMERGENCY MEDICINE

## 2023-02-25 PROCEDURE — 93010 ELECTROCARDIOGRAM REPORT: CPT | Performed by: EMERGENCY MEDICINE

## 2023-02-25 PROCEDURE — 93005 ELECTROCARDIOGRAM TRACING: CPT | Performed by: EMERGENCY MEDICINE

## 2023-02-25 PROCEDURE — 36415 COLL VENOUS BLD VENIPUNCTURE: CPT | Performed by: EMERGENCY MEDICINE

## 2023-02-25 PROCEDURE — 80048 BASIC METABOLIC PNL TOTAL CA: CPT | Performed by: EMERGENCY MEDICINE

## 2023-02-25 RX ORDER — KETOROLAC TROMETHAMINE 15 MG/ML
15 INJECTION, SOLUTION INTRAMUSCULAR; INTRAVENOUS ONCE
Status: COMPLETED | OUTPATIENT
Start: 2023-02-25 | End: 2023-02-25

## 2023-02-25 RX ORDER — ACETAMINOPHEN 325 MG/1
975 TABLET ORAL ONCE
Status: COMPLETED | OUTPATIENT
Start: 2023-02-25 | End: 2023-02-25

## 2023-02-25 RX ADMIN — ACETAMINOPHEN 975 MG: 325 TABLET ORAL at 23:38

## 2023-02-25 RX ADMIN — KETOROLAC TROMETHAMINE 15 MG: 15 INJECTION, SOLUTION INTRAMUSCULAR; INTRAVENOUS at 23:38

## 2023-02-25 ASSESSMENT — ACTIVITIES OF DAILY LIVING (ADL): ADLS_ACUITY_SCORE: 33

## 2023-02-26 VITALS
RESPIRATION RATE: 16 BRPM | SYSTOLIC BLOOD PRESSURE: 108 MMHG | OXYGEN SATURATION: 98 % | TEMPERATURE: 98.3 F | DIASTOLIC BLOOD PRESSURE: 72 MMHG | HEART RATE: 62 BPM

## 2023-02-26 LAB
ATRIAL RATE - MUSE: 67 BPM
DIASTOLIC BLOOD PRESSURE - MUSE: NORMAL MMHG
INTERPRETATION ECG - MUSE: NORMAL
P AXIS - MUSE: 52 DEGREES
PR INTERVAL - MUSE: 124 MS
QRS DURATION - MUSE: 90 MS
QT - MUSE: 342 MS
QTC - MUSE: 361 MS
R AXIS - MUSE: 46 DEGREES
SYSTOLIC BLOOD PRESSURE - MUSE: NORMAL MMHG
T AXIS - MUSE: 21 DEGREES
VENTRICULAR RATE- MUSE: 67 BPM

## 2023-02-26 NOTE — ED PROVIDER NOTES
"    [facility] EMERGENCY DEPARTMENT ([facility])     February 25, 2023  ED Provider Note  [facility]      History     Chief Complaint   Patient presents with     Chest Pain     Onset at 9 pm with intermittent \"heart\" pain with radiation down left arm, general body weakness and headache, pt was  shopping with friends  at onset.     KEKE Wolfe is a 27 year old male with a past medical history of prediabetes, headaches, and bipolar disorder. He was admitted approximately 1 year ago, based on a review of his discharge summary on 3/17/2022, and had abdominal aortitis. Patient had an extensive work-up, including rheumatology and infectious disease. As of today, he is not on prescription medications. He now presents with 1 day of substernal chest pain without dyspnea, which is positional in nature radiates down the left arm. No fever, coughing, or prior PEs or DVTs.    This part of the medical record was transcribed by [employer], from a dictation done by Griffin Kirkland MD.     Past Medical History  No past medical history on file.  No past surgical history on file.  acetaminophen (TYLENOL) 325 MG tablet  benzonatate (TESSALON) 100 MG capsule  cholecalciferol 125 MCG (5000 UT) CAPS  doxylamine (UNISOM) 25 MG TABS tablet  ergocalciferol (ERGOCALCIFEROL) 1.25 MG (39854 UT) capsule  ibuprofen (ADVIL/MOTRIN) 600 MG tablet  melatonin 3 MG tablet  naproxen (NAPROSYN) 500 MG tablet  omeprazole (PRILOSEC) 20 MG DR capsule  traZODone (DESYREL) 100 MG tablet  zolpidem (AMBIEN) 5 MG tablet      No Known Allergies  Family History  No family history on file.  Social History   Social History     Tobacco Use     Smoking status: Never     Smokeless tobacco: Never   Substance Use Topics     Alcohol use: No     Drug use: No      Past medical history, past surgical history, medications, allergies, family history, and social history were reviewed with the patient. No additional " pertinent items.      A complete review of systems was performed with pertinent positives and negatives noted in the HPI, and all other systems negative.    Physical Exam   BP: 104/74  Pulse: 77  Temp: 98.3  F (36.8  C)  Resp: 16  SpO2: 100 %  Physical Exam  Constitutional:       General: He is not in acute distress.     Appearance: He is not diaphoretic.   HENT:      Head: Atraumatic.   Eyes:      General: No scleral icterus.     Pupils: Pupils are equal, round, and reactive to light.   Cardiovascular:      Heart sounds: Normal heart sounds.   Pulmonary:      Effort: No respiratory distress.      Breath sounds: Normal breath sounds.   Chest:      Comments: Had mild tenderness to palpation on anterior chest wall  Abdominal:      General: Bowel sounds are normal.      Palpations: Abdomen is soft.      Tenderness: There is no abdominal tenderness.   Musculoskeletal:         General: No tenderness.   Skin:     General: Skin is warm.      Findings: No rash.           ED Course, Procedures, & Data     ED Course as of 02/26/23 0035   Sun Feb 26, 2023   0034 Chest x-ray negative for acute findings  Troponin negative  Patient feels improved after pain medication  We will discharge with primary care follow-up and return precautions.  I have discussed pericarditis and some of its signs and symptoms with patient     Procedures       ED Course Selections:        EKG Interpretation:      Interpreted by Griffin Kirkland MD  Time reviewed: 11:06pm  Symptoms at time of EKG: Chest Pain   Rhythm: normal sinus   Rate: 67 bpm  Axis: Normal  Ectopy: none  Conduction: normal  ST Segments/ T Waves: No ST-T wave changes  Q Waves: none  Comparison to prior: Compare with 3/12/2022 morphology similar in nature.       Clinical Impression: normal EKG                           Results for orders placed or performed during the hospital encounter of 02/25/23   Basic metabolic panel     Status: Normal   Result Value Ref Range    Sodium 138 136 - 145  mmol/L    Potassium 3.8 3.4 - 5.3 mmol/L    Chloride 99 98 - 107 mmol/L    Carbon Dioxide (CO2) 28 22 - 29 mmol/L    Anion Gap 11 7 - 15 mmol/L    Urea Nitrogen 17.3 6.0 - 20.0 mg/dL    Creatinine 0.95 0.67 - 1.17 mg/dL    Calcium 9.8 8.6 - 10.0 mg/dL    Glucose 87 70 - 99 mg/dL    GFR Estimate >90 >60 mL/min/1.73m2   Troponin T, High Sensitivity     Status: Normal   Result Value Ref Range    Troponin T, High Sensitivity <6 <=22 ng/L   CBC with platelets and differential     Status: None   Result Value Ref Range    WBC Count 7.7 4.0 - 11.0 10e3/uL    RBC Count 4.92 4.40 - 5.90 10e6/uL    Hemoglobin 14.2 13.3 - 17.7 g/dL    Hematocrit 42.8 40.0 - 53.0 %    MCV 87 78 - 100 fL    MCH 28.9 26.5 - 33.0 pg    MCHC 33.2 31.5 - 36.5 g/dL    RDW 12.2 10.0 - 15.0 %    Platelet Count 222 150 - 450 10e3/uL    % Neutrophils 52 %    % Lymphocytes 35 %    % Monocytes 11 %    % Eosinophils 1 %    % Basophils 1 %    % Immature Granulocytes 0 %    NRBCs per 100 WBC 0 <1 /100    Absolute Neutrophils 4.0 1.6 - 8.3 10e3/uL    Absolute Lymphocytes 2.7 0.8 - 5.3 10e3/uL    Absolute Monocytes 0.8 0.0 - 1.3 10e3/uL    Absolute Eosinophils 0.1 0.0 - 0.7 10e3/uL    Absolute Basophils 0.0 0.0 - 0.2 10e3/uL    Absolute Immature Granulocytes 0.0 <=0.4 10e3/uL    Absolute NRBCs 0.0 10e3/uL   CBC with Platelets & Differential     Status: None    Narrative    The following orders were created for panel order CBC with Platelets & Differential.  Procedure                               Abnormality         Status                     ---------                               -----------         ------                     CBC with platelets and d...[[id number]]                      Final result                 Please view results for these tests on the individual orders.     Medications   ketorolac (TORADOL) injection 15 mg (15 mg Intravenous $Given 2/25/23 3317)   acetaminophen (TYLENOL) tablet 975 mg (975 mg Oral $Given 2/25/23 6031)     Labs Ordered and  Resulted from Time of ED Arrival to Time of ED Departure   BASIC METABOLIC PANEL - Normal       Result Value    Sodium 138      Potassium 3.8      Chloride 99      Carbon Dioxide (CO2) 28      Anion Gap 11      Urea Nitrogen 17.3      Creatinine 0.95      Calcium 9.8      Glucose 87      GFR Estimate >90     TROPONIN T, HIGH SENSITIVITY - Normal    Troponin T, High Sensitivity <6     CBC WITH PLATELETS AND DIFFERENTIAL    WBC Count 7.7      RBC Count 4.92      Hemoglobin 14.2      Hematocrit 42.8      MCV 87      MCH 28.9      MCHC 33.2      RDW 12.2      Platelet Count 222      % Neutrophils 52      % Lymphocytes 35      % Monocytes 11      % Eosinophils 1      % Basophils 1      % Immature Granulocytes 0      NRBCs per 100 WBC 0      Absolute Neutrophils 4.0      Absolute Lymphocytes 2.7      Absolute Monocytes 0.8      Absolute Eosinophils 0.1      Absolute Basophils 0.0      Absolute Immature Granulocytes 0.0      Absolute NRBCs 0.0       XR Chest 2 Views    (Results Pending)              Medical Decision Making  The patient's presentation was of high complexity (an acute health issue posing potential threat to life or bodily function).    The patient's evaluation involved:  review of external note(s) from 3+ sources (see separate area of note for details)  review of 3+ test result(s) ordered prior to this encounter (see separate area of note for details)  strong consideration of a test (see separate area of note for details) that was ultimately deferred    The patient's management necessitated high risk (a parenteral controlled substance) and high risk (a decision regarding hospitalization).      Assessment & Plan    Atypical chest pain pattern most consistent with pericarditis or costochondritis. We will screen for atypical pneumonia or pneumothorax as well as acute coronary syndrome with chest x-ray, EKG, and labs including single high-sensitivity troponin. We will treat with Tylenol and IV Toradol. We will  reassess for disposition.      I have reviewed the nursing notes. I have reviewed the findings, diagnosis, plan and need for follow up with the patient.    New Prescriptions    No medications on file       Final diagnoses:   Chest pain, unspecified type       Griffin Kirkland MD  [facility] EMERGENCY DEPARTMENT  2/25/2023     Griffin Kirkland MD  02/26/23 0035

## 2023-02-26 NOTE — DISCHARGE INSTRUCTIONS
For pain, please take 975-1000mg acetaminophen (tylenol) every 8 hours -- do not take more than 3000mg in a 24 hour period.    You can also take 600mg ibuprofen (motrin/advil) every 6 hours for pain  Please return to emergency department for fever>104F, persistent vomiting, worsening chest pain, shortness of breath   See your primary doctor this week for follow-up appointment

## 2023-03-24 ENCOUNTER — HOSPITAL ENCOUNTER (EMERGENCY)
Facility: CLINIC | Age: 28
Discharge: HOME | End: 2023-03-25
Attending: EMERGENCY MEDICINE | Admitting: EMERGENCY MEDICINE
Payer: COMMERCIAL

## 2023-03-24 VITALS
HEART RATE: 87 BPM | TEMPERATURE: 98.2 F | RESPIRATION RATE: 16 BRPM | DIASTOLIC BLOOD PRESSURE: 74 MMHG | OXYGEN SATURATION: 99 % | SYSTOLIC BLOOD PRESSURE: 100 MMHG

## 2023-03-24 DIAGNOSIS — R51.9 HEADACHE, UNSPECIFIED HEADACHE TYPE: ICD-10-CM

## 2023-03-24 PROCEDURE — 99284 EMERGENCY DEPT VISIT MOD MDM: CPT | Mod: 25 | Performed by: EMERGENCY MEDICINE

## 2023-03-24 PROCEDURE — 99284 EMERGENCY DEPT VISIT MOD MDM: CPT | Performed by: EMERGENCY MEDICINE

## 2023-03-25 PROCEDURE — 258N000003 HC RX IP 258 OP 636: Performed by: EMERGENCY MEDICINE

## 2023-03-25 PROCEDURE — 96361 HYDRATE IV INFUSION ADD-ON: CPT | Performed by: EMERGENCY MEDICINE

## 2023-03-25 PROCEDURE — 250N000011 HC RX IP 250 OP 636: Performed by: EMERGENCY MEDICINE

## 2023-03-25 PROCEDURE — 96375 TX/PRO/DX INJ NEW DRUG ADDON: CPT | Performed by: EMERGENCY MEDICINE

## 2023-03-25 PROCEDURE — 96374 THER/PROPH/DIAG INJ IV PUSH: CPT | Performed by: EMERGENCY MEDICINE

## 2023-03-25 RX ORDER — METOCLOPRAMIDE HYDROCHLORIDE 5 MG/ML
10 INJECTION INTRAMUSCULAR; INTRAVENOUS ONCE
Status: COMPLETED | OUTPATIENT
Start: 2023-03-25 | End: 2023-03-25

## 2023-03-25 RX ORDER — DIPHENHYDRAMINE HYDROCHLORIDE 50 MG/ML
25 INJECTION INTRAMUSCULAR; INTRAVENOUS ONCE
Status: COMPLETED | OUTPATIENT
Start: 2023-03-25 | End: 2023-03-25

## 2023-03-25 RX ORDER — KETOROLAC TROMETHAMINE 15 MG/ML
15 INJECTION, SOLUTION INTRAMUSCULAR; INTRAVENOUS ONCE
Status: COMPLETED | OUTPATIENT
Start: 2023-03-25 | End: 2023-03-25

## 2023-03-25 RX ADMIN — DIPHENHYDRAMINE HYDROCHLORIDE 25 MG: 50 INJECTION, SOLUTION INTRAMUSCULAR; INTRAVENOUS at 00:39

## 2023-03-25 RX ADMIN — KETOROLAC TROMETHAMINE 15 MG: 15 INJECTION, SOLUTION INTRAMUSCULAR; INTRAVENOUS at 00:39

## 2023-03-25 RX ADMIN — METOCLOPRAMIDE HYDROCHLORIDE 10 MG: 5 INJECTION INTRAMUSCULAR; INTRAVENOUS at 00:39

## 2023-03-25 RX ADMIN — SODIUM CHLORIDE 1000 ML: 9 INJECTION, SOLUTION INTRAVENOUS at 00:39

## 2023-03-25 ASSESSMENT — ACTIVITIES OF DAILY LIVING (ADL): ADLS_ACUITY_SCORE: 35

## 2023-03-25 NOTE — DISCHARGE INSTRUCTIONS
Instructions from your doctor today:  Emergency Department (ED) testing is focused on the potential causes of your symptoms that are the most dangerous possibilities, and cannot cover every possibility. Based on the evaluation, it was deemed sufficiently safe to discharge and continue management through the clinics. Thus, follow-up is very important to assess for improvement/worsening, potential further testing, and potential treatment adjustments. If you were given opioid pain medications or other medications that can make you drowsy while in the ED, you should not drive for at least several hours and not until you feel completely back to normal.     Please make an appointment to follow up with:  - Your Primary Care Provider in 2-4 days if any ongoing symptoms  - If you do not have a primary care provider, you can be seen in follow-up and establish care by calling any of the clinics below:     - [facility] (phone: [phone])     - [facility] (phone: [phone])   - Have your clinic provider review the results from today's visit with you again, including any potential follow-up or additional testing that may be needed based on the results. Occasionally, incidental findings are found on later review by radiologists that may need follow-up.     Return to the Emergency Department immediately if you have worsening symptoms, or any other urgent health concerns.

## 2023-03-25 NOTE — ED PROVIDER NOTES
History     Chief Complaint   Patient presents with     Headache     Onset 3 days ago with headache.     HPI  Stephon Wolfe is a 27 year old male with PMH notable for GERD, asthma, Bipolar I, episodic headaches who presents to the ED with headache.  Onset this past afternoon, which was gradual, not thunderclap in onset.  Patient notes 3 days of cough that preceded the headache.  The cough has now resolved.  No congestion or sore throat.  Patient has been fasting the past 2 days for Ramadan.  This evening, patient tried 600 mg ibuprofen without sufficient pain control.  Patient points to forehead as location, no radiation, no neck pain or stiffness, no fever.  No recent head trauma.    Past Medical History  History reviewed. No pertinent past medical history.  History reviewed. No pertinent surgical history.  acetaminophen (TYLENOL) 325 MG tablet  benzonatate (TESSALON) 100 MG capsule  cholecalciferol 125 MCG (5000 UT) CAPS  doxylamine (UNISOM) 25 MG TABS tablet  ergocalciferol (ERGOCALCIFEROL) 1.25 MG (00525 UT) capsule  ibuprofen (ADVIL/MOTRIN) 600 MG tablet  melatonin 3 MG tablet  naproxen (NAPROSYN) 500 MG tablet  omeprazole (PRILOSEC) 20 MG DR capsule  traZODone (DESYREL) 100 MG tablet  zolpidem (AMBIEN) 5 MG tablet      No Known Allergies  Family History  No family history on file.  Social History   Social History     Tobacco Use     Smoking status: Never     Smokeless tobacco: Never   Substance Use Topics     Alcohol use: No     Drug use: No         A medically appropriate review of systems was performed with pertinent positives and negatives noted in the HPI, and all other systems negative.    Physical Exam   BP: 100/74  Pulse: 87  Temp: 98.2  F (36.8  C)  Resp: 16  SpO2: 99 %    Physical Exam  General: no acute distress. Appears stated age.   HENT: MMM, no oropharyngeal lesions  Eyes: PERRL, normal sclerae  Neck: non-tender, supple, no pain with ROM  Cardio: Regular rate. Regular rhythm. Extremities  well perfused  Resp: Normal work of breathing, normal respiratory rate.  Neuro: alert and fully oriented. No confusion. CN II-XII intact to testing. Strength left: 5/5 , 5/5 elbow flexion, 5/5 elbow extension, 5/5 shoulder abduction, 5/5 hip flexion, 5/5 knee flexion, 5/5 knee extension. Strength right: 5/5 , 5/5 elbow flexion, 5/5 elbow extension, 5/5 shoulder abduction, 5/5 hip flexion, 5/5 knee flexion, 5/5 knee extension. Sensation intact to soft touch in all extremities. No pronator drift. Normal tone, no clonus. Normal coordination with finger-nose.   MSK: no deformities. Grossly normal ROM in extremities.   Integumentary/Skin: no rash visualized, normal color  Psych: normal affect, normal behavior    ED Course      Procedures          Labs Ordered and Resulted from Time of ED Arrival to Time of ED Departure - No data to display  No orders to display            Medical Decision Making  The patient's presentation was of moderate complexity (an acute illness with systemic symptoms).    The patient's evaluation involved:  review of external note(s) from 1 sources ([facility] emergency medicine 2/10/2023)  review of 1 test result(s) ordered prior to this encounter (CT head 8/13/2020)  strong consideration of a test (CT head) that was ultimately deferred    The patient's management necessitated moderate risk (prescription drug management including medications given in the ED).      Assessments & Plan (with Medical Decision Making)   Patient presenting with headache. Vitals in the ED unremarkable. Nursing notes reviewed. Initial differential diagnosis includes but not limited to tension headache, migraine headache, cluster headache, intracranial hemorrhage, intracranial infection.     Chart review notable for multiple prior presentations to emergency departments for headaches.  CT head in fall of 2020 was unremarkable.    No trauma to suggest traumatic intracranial hemorrhage.  Not thunderclap to suggest  aneurysmal SAH.  No fever nor neck stiffness to suggest meningitis/encephalitis.  Exam nonfocal.  History and exam most consistent with likely tension headache.    In the ED, the patient's symptoms were managed with ketorolac, metoclopramide, diphenhydramine, NS bolus, with resolution of symptoms upon reassessment.  Patient then requesting discharge.    The complete clinical picture is most consistent with likely tension headache secondary to dehydration from fasting. After counseling on the diagnosis, work-up, and treatment plan, the patient was discharged to home. The patient was advised to follow-up with primary care in a few days if any ongoing concerns. The patient was advised to return to the ED if worsening symptoms, or if there are any urgent/life-threatening concerns.     Final diagnoses:   Headache, unspecified headache type     New Prescriptions    No medications on file     --  Chip Palencia MD   Emergency Medicine   [facility] EMERGENCY DEPARTMENT  3/24/2023     Chip Palencia MD  03/25/23 0112

## 2023-04-27 ENCOUNTER — HOSPITAL ENCOUNTER (EMERGENCY)
Facility: CLINIC | Age: 28
Discharge: HOME | End: 2023-04-28
Attending: EMERGENCY MEDICINE | Admitting: EMERGENCY MEDICINE
Payer: COMMERCIAL

## 2023-04-27 ENCOUNTER — APPOINTMENT (OUTPATIENT)
Dept: GENERAL RADIOLOGY | Facility: CLINIC | Age: 28
End: 2023-04-27
Attending: EMERGENCY MEDICINE
Payer: COMMERCIAL

## 2023-04-27 DIAGNOSIS — J10.1 INFLUENZA A: ICD-10-CM

## 2023-04-27 DIAGNOSIS — J02.0 ACUTE STREPTOCOCCAL PHARYNGITIS: ICD-10-CM

## 2023-04-27 LAB
DEPRECATED S PYO AG THROAT QL EIA: POSITIVE
FLUAV RNA SPEC QL NAA+PROBE: POSITIVE
FLUBV RNA RESP QL NAA+PROBE: NEGATIVE
RSV RNA SPEC NAA+PROBE: NEGATIVE
SARS-COV-2 RNA RESP QL NAA+PROBE: NEGATIVE

## 2023-04-27 PROCEDURE — 71046 X-RAY EXAM CHEST 2 VIEWS: CPT

## 2023-04-27 PROCEDURE — 87637 SARSCOV2&INF A&B&RSV AMP PRB: CPT | Performed by: EMERGENCY MEDICINE

## 2023-04-27 PROCEDURE — 87880 STREP A ASSAY W/OPTIC: CPT | Performed by: EMERGENCY MEDICINE

## 2023-04-27 PROCEDURE — 87637 SARSCOV2&INF A&B&RSV AMP PRB: CPT | Mod: 59 | Performed by: EMERGENCY MEDICINE

## 2023-04-27 PROCEDURE — C9803 HOPD COVID-19 SPEC COLLECT: HCPCS | Performed by: EMERGENCY MEDICINE

## 2023-04-27 PROCEDURE — 99284 EMERGENCY DEPT VISIT MOD MDM: CPT | Mod: 25,CS | Performed by: EMERGENCY MEDICINE

## 2023-04-27 PROCEDURE — 99284 EMERGENCY DEPT VISIT MOD MDM: CPT | Mod: CS | Performed by: EMERGENCY MEDICINE

## 2023-04-27 RX ORDER — OSELTAMIVIR PHOSPHATE 75 MG/1
75 CAPSULE ORAL 2 TIMES DAILY
Qty: 10 CAPSULE | Refills: 0 | Status: SHIPPED | OUTPATIENT
Start: 2023-04-27 | End: 2023-05-02

## 2023-04-27 ASSESSMENT — ACTIVITIES OF DAILY LIVING (ADL): ADLS_ACUITY_SCORE: 35

## 2023-04-28 VITALS
OXYGEN SATURATION: 100 % | WEIGHT: 146 LBS | DIASTOLIC BLOOD PRESSURE: 85 MMHG | SYSTOLIC BLOOD PRESSURE: 112 MMHG | BODY MASS INDEX: 22.2 KG/M2 | HEART RATE: 85 BPM | TEMPERATURE: 98.6 F | RESPIRATION RATE: 18 BRPM

## 2023-04-28 RX ORDER — PENICILLIN V POTASSIUM 500 MG/1
500 TABLET, FILM COATED ORAL 2 TIMES DAILY
Qty: 20 TABLET | Refills: 0 | Status: SHIPPED | OUTPATIENT
Start: 2023-04-28 | End: 2023-05-08

## 2023-04-28 NOTE — DISCHARGE INSTRUCTIONS
Your influenza and strep test were positive  Start Tamiflu as directed for influenza  Start penicillin as directed for strep throat  Drink plenty of fluids, use Tylenol and ibuprofen for pain  Use caution to prevent spread to others  Follow-up with your doctor/clinic

## 2023-04-28 NOTE — ED TRIAGE NOTES
Triage Assessment     Row Name 04/27/23 7277       Triage Assessment (Adult)    Airway WDL WDL       Respiratory WDL    Respiratory WDL X;cough    Cough Frequency frequent    Cough Type dry;productive       Skin Circulation/Temperature WDL    Skin Circulation/Temperature WDL WDL       Cardiac WDL    Cardiac WDL WDL       Peripheral/Neurovascular WDL    Peripheral Neurovascular WDL WDL       Cognitive/Neuro/Behavioral WDL    Cognitive/Neuro/Behavioral WDL WDL

## 2023-04-28 NOTE — ED NOTES
Patient Verbalized understanding of discharge instructions including medication administration and recommended follow up care as noted on discharge instructions. Written discharge instructions given, denies any further questions. Prescriptions: were printed and sent with patient.

## 2023-04-28 NOTE — ED PROVIDER NOTES
ED Provider Note  [facility]      History     Chief Complaint   Patient presents with     Cough     Recent visit to [facility], has had a productive cough and sore throat for the past two days, subjective fevers     HPI  Stephon Wolfe is a 27 year old male who recently traveled to the [place] he arrived back today in his complaints of cough sore throat malaise congestion.  He is COVID vaccinated.  He is otherwise young and healthy no medical problems.  No rash or diarrhea.    Past Medical History  No past medical history on file.  No past surgical history on file.  oseltamivir (TAMIFLU) 75 MG capsule  penicillin V (VEETID) 500 MG tablet  acetaminophen (TYLENOL) 325 MG tablet  benzonatate (TESSALON) 100 MG capsule  cholecalciferol 125 MCG (5000 UT) CAPS  doxylamine (UNISOM) 25 MG TABS tablet  ergocalciferol (ERGOCALCIFEROL) 1.25 MG (91851 UT) capsule  ibuprofen (ADVIL/MOTRIN) 600 MG tablet  melatonin 3 MG tablet  naproxen (NAPROSYN) 500 MG tablet  omeprazole (PRILOSEC) 20 MG DR capsule  traZODone (DESYREL) 100 MG tablet  zolpidem (AMBIEN) 5 MG tablet      No Known Allergies  Family History  No family history on file.  Social History   Social History     Tobacco Use     Smoking status: Never     Smokeless tobacco: Never   Substance Use Topics     Alcohol use: No     Drug use: No      Past medical history, past surgical history, medications, allergies, family history, and social history were reviewed with the patient. No additional pertinent items.      A medically appropriate review of systems was performed with pertinent positives and negatives noted in the HPI, and all other systems negative.    Physical Exam   BP: 113/76  Pulse: 92  Temp: 98.6  F (37  C)  Resp: 16  Weight: 66.2 kg (146 lb)  SpO2: 100 %  Physical Exam  Vitals and nursing note reviewed.   Constitutional:       General: He is not in acute distress.     Appearance: He is well-developed.   HENT:      Head:  Normocephalic and atraumatic.      Mouth/Throat:      Mouth: Mucous membranes are moist.   Eyes:      General: No scleral icterus.     Conjunctiva/sclera: Conjunctivae normal.      Pupils: Pupils are equal, round, and reactive to light.   Cardiovascular:      Rate and Rhythm: Normal rate and regular rhythm.      Heart sounds: Normal heart sounds.   Pulmonary:      Effort: Pulmonary effort is normal. No respiratory distress.      Breath sounds: Normal breath sounds. No wheezing.   Abdominal:      General: Abdomen is flat.      Palpations: Abdomen is soft.   Musculoskeletal:      Cervical back: Neck supple.   Skin:     General: Skin is warm and dry.   Neurological:      General: No focal deficit present.      Mental Status: He is alert and oriented to person, place, and time.      Cranial Nerves: No cranial nerve deficit.   Psychiatric:         Mood and Affect: Mood normal.         Behavior: Behavior normal.           ED Course, Procedures, & Data      Procedures                Results for orders placed or performed during the hospital encounter of 04/27/23   Symptomatic Influenza A/B, RSV, & SARS-CoV2 PCR (COVID-19) Nasopharyngeal     Status: Abnormal    Specimen: Nasopharyngeal; Swab   Result Value Ref Range    Influenza A PCR Positive (A) Negative    Influenza B PCR Negative Negative    RSV PCR Negative Negative    SARS CoV2 PCR Negative Negative    Narrative    Testing was performed using the Xpert Xpress CoV2/Flu/RSV Assay on the i[employer] GeneXpert Instrument. This test should be ordered for the detection of SARS-CoV-2, influenza, and RSV viruses in individuals who meet clinical and/or epidemiological criteria. Test performance is unknown in asymptomatic patients. This test is for in vitro diagnostic use under the FDA EUA for laboratories certified under CLIA to perform high or moderate complexity testing. This test has not been FDA cleared or approved. A negative result does not rule out the presence of PCR  inhibitors in the specimen or target RNA in concentration below the limit of detection for the assay. If only one viral target is positive but coinfection with multiple targets is suspected, the sample should be re-tested with another FDA cleared, approved, or authorized test, if coinfection would change clinical management. This test was validated by the [facility]. These laboratories are certified under the Clinical Laboratory Improvement Amendments of 1988 (CLIA-88) as qualified to perform high complexity laboratory testing.   Streptococcus A Rapid Scr w Reflx to PCR     Status: Abnormal    Specimen: Throat; Swab   Result Value Ref Range    Group A Strep antigen Positive (A) Negative     Medications - No data to display  Labs Ordered and Resulted from Time of ED Arrival to Time of ED Departure   INFLUENZA A/B, RSV, & SARS-COV2 PCR - Abnormal       Result Value    Influenza A PCR Positive (*)     Influenza B PCR Negative      RSV PCR Negative      SARS CoV2 PCR Negative     STREPTOCOCCUS A RAPID SCREEN W REFELX TO PCR - Abnormal    Group A Strep antigen Positive (*)      Chest XR,  PA & LAT    (Results Pending)          Critical care was not performed.     Medical Decision Making  The patient's presentation was of moderate complexity (an acute illness with systemic symptoms).    The patient's evaluation involved:  ordering and/or review of 3+ test(s) in this encounter (Streptococcal antigen, COVID-19, influenza)    The patient's management necessitated moderate risk (prescription drug management including medications given in the ED).      Assessment & Plan    19-year-old male with recent travel to the [place] returns now with viral symptoms and sore throat testing revealed influenza a which we will treat with Tamiflu and positive streptococcal swab we will treat with Pen-Vee K 500 twice daily for 10 days.  Rest drink plenty of fluids avoid spread to others use Tylenol and ibuprofen for  pain and recommend primary clinic follow-up.    I have reviewed the nursing notes. I have reviewed the findings, diagnosis, plan and need for follow up with the patient.    New Prescriptions    OSELTAMIVIR (TAMIFLU) 75 MG CAPSULE    Take 1 capsule (75 mg) by mouth 2 times daily for 5 days    PENICILLIN V (VEETID) 500 MG TABLET    Take 1 tablet (500 mg) by mouth 2 times daily for 10 days       Final diagnoses:   Influenza A   Acute streptococcal pharyngitis       Griffin Samayoa MD  [facility] EMERGENCY DEPARTMENT  4/27/2023     Griffin Samayoa MD  04/28/23 0015

## 2023-06-02 ENCOUNTER — APPOINTMENT (OUTPATIENT)
Dept: CT IMAGING | Facility: CLINIC | Age: 28
End: 2023-06-02
Attending: FAMILY MEDICINE
Payer: COMMERCIAL

## 2023-06-02 ENCOUNTER — APPOINTMENT (OUTPATIENT)
Dept: GENERAL RADIOLOGY | Facility: CLINIC | Age: 28
End: 2023-06-02
Attending: FAMILY MEDICINE
Payer: COMMERCIAL

## 2023-06-02 ENCOUNTER — HOSPITAL ENCOUNTER (EMERGENCY)
Facility: CLINIC | Age: 28
Discharge: HOME | End: 2023-06-02
Attending: FAMILY MEDICINE | Admitting: FAMILY MEDICINE
Payer: COMMERCIAL

## 2023-06-02 VITALS
WEIGHT: 163.6 LBS | TEMPERATURE: 97.6 F | HEART RATE: 63 BPM | SYSTOLIC BLOOD PRESSURE: 101 MMHG | BODY MASS INDEX: 24.23 KG/M2 | OXYGEN SATURATION: 100 % | HEIGHT: 69 IN | DIASTOLIC BLOOD PRESSURE: 69 MMHG | RESPIRATION RATE: 18 BRPM

## 2023-06-02 DIAGNOSIS — S00.03XA CONTUSION OF FACE, SCALP AND NECK, INITIAL ENCOUNTER: ICD-10-CM

## 2023-06-02 DIAGNOSIS — S00.83XA CONTUSION OF FACE, SCALP AND NECK, INITIAL ENCOUNTER: ICD-10-CM

## 2023-06-02 DIAGNOSIS — S10.93XA CONTUSION OF FACE, SCALP AND NECK, INITIAL ENCOUNTER: ICD-10-CM
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
## 2023-06-02 PROCEDURE — 73060 X-RAY EXAM OF HUMERUS: CPT | Mod: RT

## 2023-06-02 PROCEDURE — 99284 EMERGENCY DEPT VISIT MOD MDM: CPT | Mod: 25 | Performed by: FAMILY MEDICINE

## 2023-06-02 PROCEDURE — 70486 CT MAXILLOFACIAL W/O DYE: CPT

## 2023-06-02 PROCEDURE — 93005 ELECTROCARDIOGRAM TRACING: CPT | Performed by: FAMILY MEDICINE

## 2023-06-02 PROCEDURE — 99284 EMERGENCY DEPT VISIT MOD MDM: CPT | Performed by: FAMILY MEDICINE

## 2023-06-02 RX ORDER — IBUPROFEN 800 MG/1
800 TABLET, FILM COATED ORAL EVERY 8 HOURS PRN
Qty: 15 TABLET | Refills: 0 | Status: SHIPPED | OUTPATIENT
Start: 2023-06-02 | End: 2023-06-07

## 2023-06-02 ASSESSMENT — ACTIVITIES OF DAILY LIVING (ADL): ADLS_ACUITY_SCORE: 35

## 2023-06-02 NOTE — ED TRIAGE NOTES
Triage Assessment     Row Name 06/02/23 7663       Triage Assessment (Adult)    Airway WDL WDL       Respiratory WDL    Respiratory WDL WDL       Skin Circulation/Temperature WDL    Skin Circulation/Temperature WDL WDL       Cardiac WDL    Cardiac WDL WDL       Cognitive/Neuro/Behavioral WDL    Cognitive/Neuro/Behavioral WDL WDL
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
## 2023-06-03 NOTE — DISCHARGE INSTRUCTIONS
Discharge to home May use ibuprofen for discomfort follow-up with your primary MD if not improving.

## 2023-10-02 ENCOUNTER — PRE VISIT (OUTPATIENT)
Dept: UROLOGY | Facility: CLINIC | Age: 28
End: 2023-10-02
Payer: COMMERCIAL

## 2023-10-02 NOTE — TELEPHONE ENCOUNTER
Reason for visit: Follow      Relevant information: Pain with urination?/testicular pain     Records/imaging/labs/orders: Epic     At Rooming: Clementine El  10/2/2023  9:45 AM

## 2023-10-03 NOTE — PROGRESS NOTES
Subjective     REQUESTING PROVIDER  Referred Self    REASON FOR VISIT  Testicular pain and pain with urination    HISTORY OF PRESENT ILLNESS  Mr. Wolfe is a pleasant 27 year old male with a past medical history significant for type I bipolar disorder with severe depression, constipation, and recent appendicitis who presents today for discussion of his testicular pain and discomfort with urination.  I personally reviewed his family practice note from 5/10/2023 and his most recent emergency department note from 8/7/2023 in preparation for today's visit.    Today:  No known infections in testicles  R testicle can grow and shrink   7-8/10 in the testicles for a day or two and then will go away for a few weeks  Happening for 4-5 months  Dysuria and urgency for 4-5 months   No gross hematuria   No history of kidney stones   No history of UTIs   No history of retention   Pain has stopped him from playing soccer from time to time   Not tried any medications     REVIEW OF SYSTEMS  Review of Systems   Constitutional:  Negative for activity change and unexpected weight change.   HENT:  Negative for ear pain and tinnitus.    Eyes:  Negative for visual disturbance.   Respiratory:  Negative for shortness of breath.    Cardiovascular:  Negative for chest pain.   Gastrointestinal:  Positive for constipation. Negative for abdominal pain.   Genitourinary:  Negative for hematuria.   Musculoskeletal:  Negative for back pain.   Neurological:  Negative for dizziness and light-headedness.   Hematological:  Negative for adenopathy.   Psychiatric/Behavioral:  Negative for sleep disturbance.      Social History:  Denies any history of or current smoking     Family History:  Denies any known family history of urologic malignancy     Objective     PHYSICAL EXAM  Physical Exam  Constitutional:       Appearance: Normal appearance.   HENT:      Head: Normocephalic and atraumatic.      Nose: No congestion.      Mouth/Throat:      Mouth: Mucous  membranes are moist.   Eyes:      General:         Right eye: No discharge.         Left eye: No discharge.   Pulmonary:      Effort: No respiratory distress.   Abdominal:      General: There is no distension.   Musculoskeletal:         General: No deformity.   Skin:     General: Skin is warm and dry.   Neurological:      Mental Status: He is alert and oriented to person, place, and time.   Psychiatric:         Mood and Affect: Mood normal.         Behavior: Behavior normal.       LABORATORY  Specimen: Urine - Urine specimen (specimen)   Ref Range & Units 1 mo ago   COLOR                     Yellow Color Yellow   CLARITY                   Clear Clarity Clear   SPECIFIC GRAVITY,URINE   1.010, 1.015, 1.020, 1.025                 1.020   PH,URINE                 6.0, 7.0, 8.0, 5.5, 6.5, 7.5, 8.5                 7.0   UROBILINOGEN,QUALITATIVE Normal EU/dl Normal   PROTEIN, URINE Negative mg/dL Negative   GLUCOSE, URINE Negative mg/dL Negative   KETONES,URINE             Negative mg/dL Negative   BILIRUBIN,URINE           Negative                 Negative   OCCULT BLOOD,URINE       Negative                 Negative   NITRITE                   Negative                 Negative   LEUKOCYTE ESTERASE       Negative                 Negative     Assessment & Plan   Pelvic pain  Dysuria  Testicular lesions    It was my pleasure to meet with Mr. Wolfe in the office today in regards to his multi month history of pelvic pain, lower urinary tract symptoms and testicular lesions.  I first reviewed with him that his urinalysis from a month ago did not show any evidence of anything dangerous which is excellent.  However, given that he is feeling abnormal things on his scrotum I would recommend that he get a scrotal ultrasound first available.  This will tell us if the things he is feeling are scary or not.    In regards to his discomfort and irritative voiding symptoms, I believe that this is most likely due to a very dysfunctional  pelvic floor given the presence of both testicular pain and urinary symptoms that tend to go hand-in-hand.  With this in mind, I would recommend a referral to our colleagues in pelvic floor physical therapy for evaluation and possible exercises or other management strategies.    Mr. Wolfe expressed understanding and agreement to the above discussion and plan and all of his questions were answered to his satisfaction.     PLAN  Referral to pelvic floor physical therapy   First available scrotal ultrasound with MyChart message to discuss results     Signed by:    Carlos Marquis PA-C

## 2023-10-04 ENCOUNTER — OFFICE VISIT (OUTPATIENT)
Dept: UROLOGY | Facility: CLINIC | Age: 28
End: 2023-10-04
Payer: COMMERCIAL

## 2023-10-04 ENCOUNTER — PRE VISIT (OUTPATIENT)
Dept: UROLOGY | Facility: CLINIC | Age: 28
End: 2023-10-04

## 2023-10-04 ENCOUNTER — ANCILLARY PROCEDURE (OUTPATIENT)
Dept: ULTRASOUND IMAGING | Facility: CLINIC | Age: 28
End: 2023-10-04
Attending: STUDENT IN AN ORGANIZED HEALTH CARE EDUCATION/TRAINING PROGRAM
Payer: COMMERCIAL

## 2023-10-04 VITALS
BODY MASS INDEX: 23.7 KG/M2 | HEART RATE: 73 BPM | SYSTOLIC BLOOD PRESSURE: 130 MMHG | HEIGHT: 69 IN | WEIGHT: 160 LBS | DIASTOLIC BLOOD PRESSURE: 85 MMHG

## 2023-10-04 DIAGNOSIS — N50.89 TESTICULAR LUMP: ICD-10-CM

## 2023-10-04 DIAGNOSIS — R10.2 PELVIC PAIN IN MALE: Primary | ICD-10-CM

## 2023-10-04 DIAGNOSIS — N50.812 PAIN IN BOTH TESTICLES: ICD-10-CM

## 2023-10-04 DIAGNOSIS — N50.811 PAIN IN BOTH TESTICLES: ICD-10-CM

## 2023-10-04 PROCEDURE — 93976 VASCULAR STUDY: CPT | Mod: GC | Performed by: STUDENT IN AN ORGANIZED HEALTH CARE EDUCATION/TRAINING PROGRAM

## 2023-10-04 PROCEDURE — 99204 OFFICE O/P NEW MOD 45 MIN: CPT | Performed by: STUDENT IN AN ORGANIZED HEALTH CARE EDUCATION/TRAINING PROGRAM

## 2023-10-04 PROCEDURE — 76870 US EXAM SCROTUM: CPT | Mod: GC | Performed by: STUDENT IN AN ORGANIZED HEALTH CARE EDUCATION/TRAINING PROGRAM

## 2023-10-04 ASSESSMENT — ENCOUNTER SYMPTOMS
SHORTNESS OF BREATH: 0
ADENOPATHY: 0
HEMATURIA: 0
LIGHT-HEADEDNESS: 0
UNEXPECTED WEIGHT CHANGE: 0
ABDOMINAL PAIN: 0
CONSTIPATION: 1
BACK PAIN: 0
DIZZINESS: 0
ACTIVITY CHANGE: 0
SLEEP DISTURBANCE: 0

## 2023-10-04 ASSESSMENT — PAIN SCALES - GENERAL: PAINLEVEL: EXTREME PAIN (8)

## 2023-10-04 NOTE — LETTER
10/4/2023       RE: Stephon Wolfe  [address]  [facility] [address]     Dear Colleague,    Thank you for referring your patient, Stephon Wolfe, to the [facility] UROLOGY CLINIC [place] at [facility]. Please see a copy of my visit note below.    Subjective    REQUESTING PROVIDER  Referred Self    REASON FOR VISIT  Testicular pain and pain with urination    HISTORY OF PRESENT ILLNESS  Mr. Wolfe is a pleasant 27 year old male with a past medical history significant for type I bipolar disorder with severe depression, constipation, and recent appendicitis who presents today for discussion of his testicular pain and discomfort with urination.  I personally reviewed his family practice note from 5/10/2023 and his most recent emergency department note from 8/7/2023 in preparation for today's visit.    Today:  No known infections in testicles  R testicle can grow and shrink   7-8/10 in the testicles for a day or two and then will go away for a few weeks  Happening for 4-5 months  Dysuria and urgency for 4-5 months   No gross hematuria   No history of kidney stones   No history of UTIs   No history of retention   Pain has stopped him from playing soccer from time to time   Not tried any medications     REVIEW OF SYSTEMS  Review of Systems   Constitutional:  Negative for activity change and unexpected weight change.   HENT:  Negative for ear pain and tinnitus.    Eyes:  Negative for visual disturbance.   Respiratory:  Negative for shortness of breath.    Cardiovascular:  Negative for chest pain.   Gastrointestinal:  Positive for constipation. Negative for abdominal pain.   Genitourinary:  Negative for hematuria.   Musculoskeletal:  Negative for back pain.   Neurological:  Negative for dizziness and light-headedness.   Hematological:  Negative for adenopathy.   Psychiatric/Behavioral:  Negative for sleep disturbance.      Social History:  Denies any history  of or current smoking     Family History:  Denies any known family history of urologic malignancy     Objective    PHYSICAL EXAM  Physical Exam  Constitutional:       Appearance: Normal appearance.   HENT:      Head: Normocephalic and atraumatic.      Nose: No congestion.      Mouth/Throat:      Mouth: Mucous membranes are moist.   Eyes:      General:         Right eye: No discharge.         Left eye: No discharge.   Pulmonary:      Effort: No respiratory distress.   Abdominal:      General: There is no distension.   Musculoskeletal:         General: No deformity.   Skin:     General: Skin is warm and dry.   Neurological:      Mental Status: He is alert and oriented to person, place, and time.   Psychiatric:         Mood and Affect: Mood normal.         Behavior: Behavior normal.       LABORATORY  Specimen: Urine - Urine specimen (specimen)   Ref Range & Units 1 mo ago   COLOR                     Yellow Color Yellow   CLARITY                   Clear Clarity Clear   SPECIFIC GRAVITY,URINE   1.010, 1.015, 1.020, 1.025                 1.020   PH,URINE                 6.0, 7.0, 8.0, 5.5, 6.5, 7.5, 8.5                 7.0   UROBILINOGEN,QUALITATIVE Normal EU/dl Normal   PROTEIN, URINE Negative mg/dL Negative   GLUCOSE, URINE Negative mg/dL Negative   KETONES,URINE             Negative mg/dL Negative   BILIRUBIN,URINE           Negative                 Negative   OCCULT BLOOD,URINE       Negative                 Negative   NITRITE                   Negative                 Negative   LEUKOCYTE ESTERASE       Negative                 Negative     Assessment & Plan  Pelvic pain  Dysuria  Testicular lesions    It was my pleasure to meet with Mr. Wolfe in the office today in regards to his multi month history of pelvic pain, lower urinary tract symptoms and testicular lesions.  I first reviewed with him that his urinalysis from a month ago did not show any evidence of anything dangerous which is excellent.  However, given  that he is feeling abnormal things on his scrotum I would recommend that he get a scrotal ultrasound first available.  This will tell us if the things he is feeling are scary or not.    In regards to his discomfort and irritative voiding symptoms, I believe that this is most likely due to a very dysfunctional pelvic floor given the presence of both testicular pain and urinary symptoms that tend to go hand-in-hand.  With this in mind, I would recommend a referral to our colleagues in pelvic floor physical therapy for evaluation and possible exercises or other management strategies.    Mr. Wolfe expressed understanding and agreement to the above discussion and plan and all of his questions were answered to his satisfaction.     PLAN  Referral to pelvic floor physical therapy   First available scrotal ultrasound with Trans[employer] message to discuss results     Signed by:    Carlos Marquis PA-C

## 2023-10-04 NOTE — NURSING NOTE
"Chief Complaint   Patient presents with    Consult For     Testicular Pain and pain when urinating       Blood pressure 130/85, pulse 73, height 1.74 m (5' 8.5\"), weight 72.6 kg (160 lb). Body mass index is 23.97 kg/m .    Patient Active Problem List   Diagnosis    Aortitis (H24)       No Known Allergies    Current Outpatient Medications   Medication Sig Dispense Refill    acetaminophen (TYLENOL) 325 MG tablet Take 325-650 mg by mouth every 6 hours as needed for mild pain      melatonin 3 MG tablet Take 1 tablet (3 mg) by mouth nightly as needed for sleep 30 tablet 1    benzonatate (TESSALON) 100 MG capsule Take 1 capsule (100 mg) by mouth 3 times daily as needed for cough (Patient not taking: Reported on 10/4/2023) 15 capsule 0    cholecalciferol 125 MCG (5000 UT) CAPS  (Patient not taking: Reported on 10/4/2023)      doxylamine (UNISOM) 25 MG TABS tablet Take 1 tablet (25 mg) by mouth nightly as needed for sleep (Patient not taking: Reported on 10/4/2023) 15 tablet 0    ergocalciferol (ERGOCALCIFEROL) 1.25 MG (21118 UT) capsule daily. (Patient not taking: Reported on 10/4/2023)      ibuprofen (ADVIL/MOTRIN) 600 MG tablet Take 1 tablet (600 mg) by mouth every 6 hours as needed for moderate pain (Patient not taking: Reported on 10/4/2023) 30 tablet 0    naproxen (NAPROSYN) 500 MG tablet Take 1 tablet (500 mg) by mouth 2 times daily as needed for moderate pain (4-6) (Patient not taking: Reported on 10/4/2023) 30 tablet 0    omeprazole (PRILOSEC) 20 MG DR capsule Take 1 capsule (20 mg) by mouth daily (Patient not taking: Reported on 10/4/2023) 30 capsule 1    traZODone (DESYREL) 100 MG tablet Take 1 tablet (100 mg) by mouth At Bedtime (Patient not taking: Reported on 10/4/2023) 30 tablet 0    zolpidem (AMBIEN) 5 MG tablet Take 1 tablet (5 mg) by mouth nightly as needed for sleep (Patient not taking: Reported on 10/4/2023) 10 tablet 0       Social History     Tobacco Use    Smoking status: Never    Smokeless tobacco: " Never   Substance Use Topics    Alcohol use: No    Drug use: No       Markie Uribe MA  10/4/2023  2:25 PM

## 2023-10-06 ENCOUNTER — TELEPHONE (OUTPATIENT)
Dept: UROLOGY | Facility: CLINIC | Age: 28
End: 2023-10-06
Payer: COMMERCIAL

## 2023-10-06 NOTE — CONFIDENTIAL NOTE
Spoke with patient and read ultrasound impression to him from radiologist report.  This author's direct line provided for future questions/concerns.    Eduard Chow, RN  RN Care Coordinator - Urology

## 2023-11-13 NOTE — PROGRESS NOTES
PHYSICAL THERAPY EVALUATION  Type of Visit: Evaluation    See electronic medical record for Abuse and Falls Screening details.    Subjective       Presenting condition or subjective complaint: Patient reports pain in bilateral testicles. Started about 5 months ago. Insidious onset. Reports increase in bladder frequency. Sometimes full bladder increases pain. He reports nothing aggravates pain. Low level constant pain. Reports pain in R more and feels like R testicle is bigger.   Date of onset: 10/04/23 (referral)    Relevant medical history: Depression; Severe headaches   Dates & types of surgery:      Prior diagnostic imaging/testing results: CT scan     Prior therapy history for the same diagnosis, illness or injury: No      Employment: Yes    Hobbies/Interests:      Patient goals for therapy:      Pain assessment: Pain present     Objective      PELVIC EVALUATION  ADDITIONAL HISTORY:  Sex assigned at birth: Male  Gender identity: Male    Pronouns: He/Him/His      Bladder History:  Feels bladder filling: No  Triggers for feeling of inability to wait to go to the bathroom: No    How long can you wait to urinate: few min; reports going to bathroom every 4 hours during the day  Gets up at night to urinate: Yes 1  Can stop the flow of urine when urinating: No  Volume of urine usually released: Medium   Other issues:    Number of bladder infections in last 12 months:    Fluid intake per day: 2 bottles of water (32 oz about) 2 cups tea    Medications taken for bladder:       Activities causing urine leak:      Amount of urine typically leaked:    Pads used to help with leaking:          Bowel History:  Frequency of bowel movement: 2x/week;  Consistency of stool: Hard    Ignores the urge to defecate:    Other bowel issues:    Length of time spent trying to have a bowel movement: sometimes has to push to eliminate    Sexual Function History:  Sexual orientation: Straight    Sexually active: No  Lubrication used:       Pelvic pain: Standing    Pain or difficulty with orgasms/erection/ejaculation: No    State of menopause:    Hormone medications: No      Are you currently pregnant: No    Discussed reason for referral regarding pelvic health needs and external/internal pelvic floor muscle examination with patient/guardian.  Opportunity provided to ask questions and verbal consent for assessment and intervention was given.    PAIN: Pain Level at Rest: 5/10  Pain Level with Use: 7/10  POSTURE: Standing Posture: WNL   LUMBAR SCREEN: AROM WFL  HIP SCREEN: ROM flex min limited pain at lower abdomen with max flex; IR max limited kelsi; ER mod limited kelsi   FLEXIBILITY : mod restriction ER, hip flexor, adductors, min restriction HS   Strength:   Pain: - none + mild ++ moderate +++ severe  Strength Scale: 0-5/5 Left Right   Hip Flexion 4- 4   Hip Extension 4- 4   Hip Abduction 4 4   Hip Internal Rotation 4- 4   Hip External Rotation 4- 4      Functional Strength Testing: Double Leg Squat: Increased trunk lean, Quadriceps avoidance squatting pattern, and reports hard on muscles  Single Leg Squat: very minimal knee flex reports this is hard on muscles   SLS: decreased stability kelsi; RLE lateral trunk lean       PAIN PROVOCATION TEST:  FADIR/LEONARDO - kelsi     BREATHING SYMMETRY: Decreased rib cage mobility    PELVIC EXAM  External Palpation in SL  At rest: Normal, reports slight tenderness   With voluntary pelvic floor contraction: Perineal descent, able to palpate perineal tension with cues for appropriate PFM mechanics   Relaxation of PFM: Yes  With intra-abdominal pressure: Bearing down as defecation: Perineal descent  TrA: Perineal tension   DBE: no movement with PF; demonstrated no abdominal expansion initially       ABDOMINAL ASSESSMENT    Fascial Tension/Restriction/Tone: Hypomobile, Abdominal MFR alleviated pain; lower abdominal quadrants hypomobile upper quadrants WFL         Assessment & Plan   CLINICAL IMPRESSIONS  Medical Diagnosis:  Pelvic pain in male  Pain in both testicles    Treatment Diagnosis: Pelvic pain in male Pain in both testicles, pelvic floor dysfunction   Impression/Assessment: Patient is a 27 year old male with pelvic  complaints.  The following significant findings have been identified: Pain, Decreased ROM/flexibility, Decreased strength, Decreased proprioception, and Decreased activity tolerance. These impairments interfere with their ability to perform self care tasks, recreational activities, household mobility, and community mobility as compared to previous level of function.     Clinical Decision Making (Complexity):  Clinical Presentation: Stable/Uncomplicated  Clinical Presentation Rationale: based on medical and personal factors listed in PT evaluation  Clinical Decision Making (Complexity): Low complexity    PLAN OF CARE  Treatment Interventions:  Interventions: Gait Training, Manual Therapy, Neuromuscular Re-education, Therapeutic Activity, Therapeutic Exercise, Self-Care/Home Management    Long Term Goals     PT Goal 1  Goal Identifier: LTG 1  Goal Description: Pt will improve PF awareness and relaxation and reports testicular pain 3/10 in sitting for 30 min  Rationale: to maximize safety and independence with performance of ADLs and functional tasks;to maximize safety and independence with transportation  Goal Progress: currently 7/10  Target Date: 01/24/24      Frequency of Treatment: 1x/week tapering to every other week  Duration of Treatment: 10 weeks    Recommended Referrals to Other Professionals: Physical Therapy  Education Assessment:   Learner/Method: Patient;Listening;No Barriers to Learning    Risks and benefits of evaluation/treatment have been explained.   Patient/Family/caregiver agrees with Plan of Care.     Evaluation Time:     PT Eval, Low Complexity Minutes (65287): 20     Signing Clinician: Ambreen Hampton PT      [facility]                                                                                    OUTPATIENT PHYSICAL THERAPY      PLAN OF TREATMENT FOR OUTPATIENT REHABILITATION   Patient's Last Name, First Name, Stephon Winter YOB: 1995   Provider's Name   Psychiatric   Medical Record No.  [id number]     Onset Date: 10/04/23 (referral)  Start of Care Date: 11/15/23     Medical Diagnosis:  Pelvic pain in male  Pain in both testicles      PT Treatment Diagnosis:  Pelvic pain in male Pain in both testicles, pelvic floor dysfunction Plan of Treatment  Frequency/Duration: 1x/week tapering to every other week/ 10 weeks    Certification date from 11/15/23 to 01/24/24         See note for plan of treatment details and functional goals     Ambreen Hampton, PT                         I CERTIFY THE NEED FOR THESE SERVICES FURNISHED UNDER        THIS PLAN OF TREATMENT AND WHILE UNDER MY CARE     (Physician attestation of this document indicates review and certification of the therapy plan).              Referring Provider:  Carlos Marquis    Initial Assessment  See Epic Evaluation- Start of Care Date: 11/15/23

## 2023-11-15 ENCOUNTER — THERAPY VISIT (OUTPATIENT)
Dept: PHYSICAL THERAPY | Facility: CLINIC | Age: 28
End: 2023-11-15
Attending: STUDENT IN AN ORGANIZED HEALTH CARE EDUCATION/TRAINING PROGRAM
Payer: COMMERCIAL

## 2023-11-15 DIAGNOSIS — M62.89 PELVIC FLOOR DYSFUNCTION: Primary | ICD-10-CM

## 2023-11-15 DIAGNOSIS — R10.2 PELVIC PAIN IN MALE: ICD-10-CM

## 2023-11-15 DIAGNOSIS — N50.812 PAIN IN BOTH TESTICLES: ICD-10-CM

## 2023-11-15 DIAGNOSIS — N50.811 PAIN IN BOTH TESTICLES: ICD-10-CM

## 2023-11-15 PROCEDURE — 97110 THERAPEUTIC EXERCISES: CPT | Mod: GP

## 2023-11-15 PROCEDURE — 97140 MANUAL THERAPY 1/> REGIONS: CPT | Mod: GP

## 2023-11-15 PROCEDURE — 97161 PT EVAL LOW COMPLEX 20 MIN: CPT | Mod: GP

## 2023-11-17 ENCOUNTER — OFFICE VISIT (OUTPATIENT)
Dept: RHEUMATOLOGY | Facility: CLINIC | Age: 28
End: 2023-11-17
Attending: INTERNAL MEDICINE
Payer: COMMERCIAL

## 2023-11-17 VITALS
HEIGHT: 69 IN | WEIGHT: 154 LBS | SYSTOLIC BLOOD PRESSURE: 113 MMHG | BODY MASS INDEX: 22.81 KG/M2 | HEART RATE: 84 BPM | DIASTOLIC BLOOD PRESSURE: 80 MMHG

## 2023-11-17 DIAGNOSIS — I77.6 AORTITIS (H): Primary | ICD-10-CM

## 2023-11-17 PROCEDURE — 99213 OFFICE O/P EST LOW 20 MIN: CPT | Performed by: INTERNAL MEDICINE

## 2023-11-17 PROCEDURE — G0463 HOSPITAL OUTPT CLINIC VISIT: HCPCS | Performed by: INTERNAL MEDICINE

## 2023-11-17 ASSESSMENT — PAIN SCALES - GENERAL: PAINLEVEL: NO PAIN (0)

## 2023-11-17 NOTE — NURSING NOTE
"Chief Complaint   Patient presents with    Follow Up     /80   Pulse 84   Ht 1.74 m (5' 8.5\")   Wt 69.9 kg (154 lb)   BMI 23.08 kg/m    Deborah Ortega CMA on 11/17/2023 at 10:55 AM    "

## 2023-11-17 NOTE — LETTER
11/17/2023       RE: Stephon Wolfe  [address]  [facility] [address]     Dear Colleague,    Thank you for referring your patient, Stephon Wolfe, to the [facility] RHEUMATOLOGY CLINIC [place] at [facility]. Please see a copy of my visit note below.    Rheumatology Clinic Visit  [facility]  Omid Taveras M.D.     Stephon Wolfe MRN# [id number]   YOB: 1995 Age: 27 year old   Date of Visit: 11/17/2023  Primary care provider: [place]          Assessment and Plan:     # Thickened abdominal aorta, noted March 2022  # History of bipolar disorder  # History of acute gastroenteritis/dysentery  # History of latent TB, sufficiently treated by report  # IgA deficiency, mild:    Patient relates squeezing epigastric pain, self-limited, occurring several times weekly, relieved with eating.  Physical exam shows mild epigastric tenderness without rebound.  Cardiovascular exam is normal  Data:  Lab work 3-2023 showed comprehensive metabolic panel, CBC all normal.    Imaging:  MRA of the abdomen January 17, 2023 showed persistent enhancing aortic mural thickening from inferior to the renal arteries to above the inferior mesenteric artery, slightly progressed.  No periaortic enhancement or abnormal T2 signal.  Testicular ultrasound on August 4, 2023 showed normal scrotal ultrasound.    MRA abdomen April 25, 2022 showed enhancing (mild, endothelial enhancement on T2 weighted scans) aortic wall thickening from the renal origins to the inferior mesenteric artery.  No periaortic fat stranding, T2 signal, or enhancement.  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    Discussion: There continue to be no systemic inflammatory or other symptoms associated with a incidental radiographic finding of aortic wall thickening, noted on CT scan in March 2022,  and MRA abdomen in April 2022.  The patient's apparent vigorous good health argues strongly against the presence of untreated or progressive systemic inflammatory disease such as vasculitis, malignancy, or chronic infection like TB.  Most likely explanation for the imaging abnormality remains old/healed aortic inflammation, that may have occurred following distant infection.     No treatment for infection, and no treatment for inflammation is warranted presently.  I do not think that routine repeat imaging of the aorta is warranted at present.  There is no need for restriction on physical activity, diet, or travel at this time. Plan to monitor for development of systemic inflammatory disease symptoms (fever, weight loss, abdominal pain, lower extremity pain with exertion).    # History of tuberculosis, latent, treated in 2006 per patient report, positive QuantiFERON gold test March 2022.  Most likely, persistently positive tuberculosis test in March 2022 is due to immune system reactivity developed years ago after TB exposure.  There is no evidence of active TB now.    #Epigastric pain: Association with eating points towards gastric or esophageal pain generator.  I recommend trial of omeprazole 20 mg daily for 30 days.  Follow-up with primary care if no improvement.      Plan:  1.  No activity restrictions; Followup with primary care doctor if intermittent right chest wall pain worsens.  2.  Report to rheumatology or primary care recurrence of abdominal pain, or of leg/thigh pain that occurs with exercise.  3.  No treatment needed for aorta abnormality today.    RTC 12 mos    Omid Taveras MD  Staff Rheumatologist, [facility]           History of Present Illness:   Stephon Wolfe presents for follow-up of abnormal aorta radiographic findings. Last seen in November 2022.  Impression at that time was of aortic wall thickening noted on CT scan in March 2022 and MRA of the abdomen in April 2022.  Patient also had  "history of positive TB test, treated in 2006.  Plan was for watchful waiting, and repeat MRI of the aorta in 6 months.    Interval history November 17, 2023    H he \"appenditcitis\" in 8-2023. Treated with Abx. No further abd pain. Occasionally has R side pain, associated with running. No N/V./D. appetite good.  Knee pain is better; he is playing sports.    Patient denies fevers chills sweats low appetite, fatigue, cough, shortness of breath, chest pain, joint pain, numbness weakness or tingling, skin rash.  He does note occasional right flank pain, made worse with taking a deep breath or moving.      Interval history November 25, 2022    [place]  He reports enjoying vigorous good health while overseas.  He reports \"heart pain\", like \"something heavy\" on the L side of chest, occurs  When sleeping, sometimes when walking. No SOB, no nausea , no HA. Sensation goes away in 5-30 minutes.  He had cough and fever 2 weeks ago; symptoms are improved.  He notes L > R knee pain; pain is severe enough that he has not played sports.  Otherwise, he feels well.  Good energy level, good appetite, and no restriction in activities based on abdominal pain.    History, May 17, 2022, post hospital discharge.  Patient was encountered by rheumatology in the emergency room in mid March 2022.  Impression at that time was of an acute episode of gastroenteritis/dysentery, and a coincidental abdominal CT finding of aortic wall thickening:      \"...Syndrome of acute gastroenteritis/dysentery is associated with segmental abdominal aortic wall thickening, but no correlates of systemic rheumatic disease, toxin, infection, or even of systemic inflammation are present. Healed/old aortic inflammatory disease is highest on the differential diagnosis of the aortic abnormalities seen on CT scan. MRI with contrast will be more sensitive to inflammatory changes in the aortic endothelium. Therapy may be withheld pending " "laboratory workup for infection, IgG4-related disease, and while awaiting followup imaging.\"    Patient was hospitalized for observation for 5 days and discharged on March 17, 2022.  Discharge summary listed following benign hospital course:    \" ...26 year old male with a past medical history significant for prediabetes, tension headaches, back pain, hip and knee pain, and bipolar disorder admitted from the ED on 3/12/2022 for emesis and abdominal pain. 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 MRA abdomen confirmed abdominal aortitis, while MRA chest indicated normal physiology with no inflammation in the proximal aorta/subclavian/carotic arteries. Rheumatology consulted and overall work up was negative, except patient found to be partially IgA deficient. Broad ID workup: TB Quantiferon positive for latent TB, and Beta D Glucan also positive. Fungal blood culture pending, and ID will follow up outpatient with results. Suspicion for fungal infection very low, since patient blood cultures were negative. Aortitis DDx includes Takayasu arteritis vs. Aortitis secondary to TB vs. Idiopathic aortitis. ID suggesting patient be treated for latent TB, however, he reports prior treatment (around 2006). Patient and family still searching for documentation confirming TB treatment though discussed with ID who feels no need to repeat treatment and that current evidence for treatment is sufficient...\"    Visit conducted through an .    Today, the patient states that he is feeling well.  He has had no recurrence of the abdominal pain that brought him into the emergency room in March.  He has no fevers chills sweats weight loss, malaise abdominal pain, change in bowel habits, skin rash, joint pain, numbness weakness or tingling in arms or legs, cough, shortness of breath, or back pain.    He has not been engaging in vigorous physical exercise, " because he understood that he should restrict physical activity while awaiting follow-up of the aortic abnormality.  He reports today that he is eager to resume playing soccer, and wants to know that it will be safe for him to undertake a planned 3-month trip to [facility] starting in late May 2022.           Review of Systems:     Constitutional: negative  Skin: negative  Eyes: negative  Ears/Nose/Throat: negative  Respiratory: HPI  Cardiovascular: HPI  Gastrointestinal: 5 days ago, he had squeezing pain for 2 days in upper abdomen. No abd pain prior.  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  No past surgical history on file.         Social History:     Social History     Socioeconomic History    Marital status: Single     Spouse name: Not on file    Number of children: Not on file    Years of education: Not on file    Highest education level: Not on file   Occupational History    Not on file   Tobacco Use    Smoking status: Never    Smokeless tobacco: Never   Substance and Sexual Activity    Alcohol use: No    Drug use: No    Sexual activity: Not Currently   Other Topics Concern    Not on file   Social History Narrative    Not on file     Social Determinants of Health     Financial Resource Strain: Not on file   Food Insecurity: Not on file   Transportation Needs: Not on file   Physical Activity: Not on file   Stress: Not on file   Social Connections: Not on file   Interpersonal Safety: Not on file   Housing Stability: Not on file  "         Family History:   No family history on file.         Allergies:   No Known Allergies         Medications:     Current Outpatient Medications   Medication Sig Dispense Refill    acetaminophen (TYLENOL) 325 MG tablet Take 325-650 mg by mouth every 6 hours as needed for mild pain      benzonatate (TESSALON) 100 MG capsule Take 1 capsule (100 mg) by mouth 3 times daily as needed for cough (Patient not taking: Reported on 10/4/2023) 15 capsule 0    cholecalciferol 125 MCG (5000 UT) CAPS  (Patient not taking: Reported on 10/4/2023)      doxylamine (UNISOM) 25 MG TABS tablet Take 1 tablet (25 mg) by mouth nightly as needed for sleep (Patient not taking: Reported on 10/4/2023) 15 tablet 0    ergocalciferol (ERGOCALCIFEROL) 1.25 MG (71253 UT) capsule daily. (Patient not taking: Reported on 10/4/2023)      ibuprofen (ADVIL/MOTRIN) 600 MG tablet Take 1 tablet (600 mg) by mouth every 6 hours as needed for moderate pain (Patient not taking: Reported on 10/4/2023) 30 tablet 0    melatonin 3 MG tablet Take 1 tablet (3 mg) by mouth nightly as needed for sleep (Patient not taking: Reported on 11/17/2023) 30 tablet 1    naproxen (NAPROSYN) 500 MG tablet Take 1 tablet (500 mg) by mouth 2 times daily as needed for moderate pain (4-6) (Patient not taking: Reported on 10/4/2023) 30 tablet 0    omeprazole (PRILOSEC) 20 MG DR capsule Take 1 capsule (20 mg) by mouth daily (Patient not taking: Reported on 10/4/2023) 30 capsule 1    traZODone (DESYREL) 100 MG tablet Take 1 tablet (100 mg) by mouth At Bedtime (Patient not taking: Reported on 10/4/2023) 30 tablet 0    zolpidem (AMBIEN) 5 MG tablet Take 1 tablet (5 mg) by mouth nightly as needed for sleep (Patient not taking: Reported on 10/4/2023) 10 tablet 0            Physical Exam:   Blood pressure 113/80, pulse 84, height 1.74 m (5' 8.5\"), weight 69.9 kg (154 lb).  Wt Readings from Last 6 Encounters:   11/17/23 69.9 kg (154 lb)   10/04/23 72.6 kg (160 lb)   06/02/23 74.2 kg (163 lb " 9.6 oz)   04/27/23 66.2 kg (146 lb)   11/25/22 69.9 kg (154 lb)   10/22/22 72.6 kg (160 lb)     Constitutional: well-developed, appearing stated age; cooperative  Eyes: nl EOM, PERRLA, vision, conjunctiva, sclera  ENT: nl external ears, nose, hearing, lips, teeth, gums, throat  No mucous membrane lesions, normal saliva pool  Neck: no mass or thyroid enlargement  Resp: breathing unlabored, lungs CTA; mild chest wall tenderness in the mid axillary line on the right at the 10th rib.  Cor: RRR S1 S2  GI: no ABD mass; no abdominal tenderness; no HSM, no pulsatile mass palpable in the midline.  Dorsalis pedis pulses symmetrical and full.  Lymph: no cervical, supraclavicular, inguinal or epitrochlear nodes  MS: Normal range of motion in all upper extremity and lower extremity joints.  Skin: no nail pitting, alopecia, rash, nodules or lesions  Neuro: nl cranial nerves, strength, sensation, DTRs.   Psych: nl judgement, orientation, memory, affect.         Data:     @      Latest Ref Rng & Units 3/17/2022     9:36 AM 10/22/2022     9:44 PM 2/25/2023    11:14 PM   RHEUM RESULTS   Albumin 3.4 - 5.0 g/dL 3.9  3.9     ALT 0 - 70 U/L 19  18     AST 0 - 45 U/L 16  13     Creatinine 0.67 - 1.17 mg/dL 0.77  0.78  0.95    GFR Estimate >60 mL/min/1.73m2 >90  >90  >90    Hematocrit 40.0 - 53.0 % 44.5  41.9  42.8    Hemoglobin 13.3 - 17.7 g/dL 15.0  14.3  14.2    WBC 4.0 - 11.0 10e3/uL 6.0  5.5  7.7    RBC Count 4.40 - 5.90 10e6/uL 5.06  4.83  4.92    RDW 10.0 - 15.0 % 12.6  12.4  12.2    MCHC 31.5 - 36.5 g/dL 33.7  34.1  33.2    MCV 78 - 100 fL 88  87  87    Platelet Count 150 - 450 10e3/uL 215  229  222         ,  ,  ,  ,  ,  ,   ALEXANDER interpretation   Date Value Ref Range Status   03/12/2022 Negative Negative Final     Comment:       Negative:              <1:40  Borderline Positive:   1:40 - 1:80  Positive:              >1:80   ,  ,  ,  ,  ,  ,  ,  ,  ,   Hepatitis B Surface Antigen   Date Value Ref Range Status   03/12/2022  Nonreactive Nonreactive Final   ,  ,  ,  ,   Quantiferon-TB Gold Plus   Date Value Ref Range Status   03/13/2022 Positive (A) Negative Final     Comment:     Interferon gamma response to M.tuberculosis antigens was detected,suggesting infection with M.tuberculosis. Positive results in patients at low risk for infection should be interpreted with caution and repeat testing on a new sample should be considered as recommended by the 2017 ATS/IDSA/CDC Clinical Prac  terry Guidelines for Diagnosis of Tuberculosis in Adults and Children      TB1 Ag minus Nil Value   Date Value Ref Range Status   03/13/2022 8.66 IU/mL Final     TB2 Ag minus Nil Value   Date Value Ref Range Status   03/13/2022 8.66 IU/mL Final     Mitogen minus Nil Result   Date Value Ref Range Status   03/13/2022 8.66 IU/mL Final     Nil Result   Date Value Ref Range Status   03/13/2022 1.34 IU/mL Final   ,  ,  ,  ,  ,  ,  ,  ,  ,  ,   Neutrophil Cytoplasmic Antibody   Date Value Ref Range Status   03/12/2022 <1:10 <1:10 Final   ,   Neutrophil Cytoplasmic Antibody Pattern   Date Value Ref Range Status   03/12/2022   Final    The ANCA IFA is <1:10.  No further testing will be performed.   ,  ,  ,  ,  ,  ,  ,   Immunoglobulin G   Date Value Ref Range Status   03/16/2022 1,445 610-1,616 mg/dL Final   03/16/2022 1,445 610-1,616 mg/dL Final     Immunoglobulin A   Date Value Ref Range Status   03/12/2022 45 (L) 84 - 499 mg/dL Final     Immunoglobulin M   Date Value Ref Range Status   03/12/2022 77 35 - 242 mg/dL Final     Narrative & Impression   MRA ABDOMEN WITH 3D AND MULTIPLANAR RECONSTRUCTIONS 4/25/2022 4:50 PM     CLINICAL HISTORY: Aortitis (H).      COMPARISONS: CT 3/12/2022     REFERRING PROVIDER: IRAIS NEAL     TECHNIQUE: Coronal T2 HASTE, TRUFISP and T1 3D VIBE PRE and axial  TRUFISP, T1 3D VIBE PRE, and 2DTOF sequences obtained. MIP  reconstructions made from the 2DTOF. Coronal FL3D angio sequences  obtained. Post contrast coronal and axial T1 3D  VIBE sequences  obtained. Subtraction reconstructions produced.     3D and multiplanar reconstructions were produced.     FINDINGS: Aortic wall thickening again demonstrated from the level of  the renal arteries to the inferior mesenteric artery. Wall T2 signal  and enhancement. No periaortic fat stranding or T2 signal.     No aortic stenosis or dissection.     Aorta:       Celiac origin: 15 mm       Infrarenal (including thickened wall): 18 mm       Inferior mesenteric origin: 12 mm       Above the bifurcation: 14 mm     Celiac, superior mesenteric, bilateral single renal, and inferior  mesenteric arteries are patent.     Circumaortic left renal artery retroaortic branch crosses behind the  aorta just above the level of the inferior mesenteric artery.     Right arterial and venous and left venous corona mortis were  demonstrated in the previous CT but not included in this study.     Left common iliac vein measures 2.7 mm in diameter between the right  common iliac artery and spine and 7.7 mm laterally for a 65% diameter  stenosis.                                                                      IMPRESSION:  1. Aortitis again demonstrated with enhancing wall thickening from the  renal origins to the inferior mesenteric artery. No periaortic fat  stranding, T2 signal, or enhancement.     2. 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 No thrombus demonstrated.     Reference:  Crispin L, Armandon U, Ulkrissyn S, Koc Z, Terthad F. Compression of the  left common iliac vein in asymptomatic subjects and patients with left  iliofemoral deep vein thrombosis. J Vasc Interv Radiol. 2008  Mar;19(3):366-70; quiz 371. doi: 10.1016/j.jvir.2007.09.007. PMID:  94701237.     Compression in excess of 70% can be helpful for identifying possible  underlying May Thurner Syndrome (iliac vein compression syndrome -  IVCS) in patients with a left lower extremity deep vein thrombosis.      HERMAN SUAREZ MD    Again, thank you for allowing me to participate in the care of your patient.      Sincerely,    Omid Taveras MD

## 2023-11-17 NOTE — PROGRESS NOTES
Rheumatology Clinic Visit  [facility]  Omid Taveras M.D.     Stephon Wolfe MRN# [id number]   YOB: 1995 Age: 27 year old   Date of Visit: 11/17/2023  Primary care provider: [facility]          Assessment and Plan:     # Thickened abdominal aorta, noted March 2022  # History of bipolar disorder  # History of acute gastroenteritis/dysentery  # History of latent TB, sufficiently treated by report  # IgA deficiency, mild:    Patient relates squeezing epigastric pain, self-limited, occurring several times weekly, relieved with eating.  Physical exam shows mild epigastric tenderness without rebound.  Cardiovascular exam is normal  Data:  Lab work 3-2023 showed comprehensive metabolic panel, CBC all normal.    Imaging:  MRA of the abdomen January 17, 2023 showed persistent enhancing aortic mural thickening from inferior to the renal arteries to above the inferior mesenteric artery, slightly progressed.  No periaortic enhancement or abnormal T2 signal.  Testicular ultrasound on August 4, 2023 showed normal scrotal ultrasound.    MRA abdomen April 25, 2022 showed enhancing (mild, endothelial enhancement on T2 weighted scans) aortic wall thickening from the renal origins to the inferior mesenteric artery.  No periaortic fat stranding, T2 signal, or enhancement.  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    Discussion: There continue to be no systemic inflammatory or other symptoms associated with a incidental radiographic finding of aortic wall thickening, noted on CT scan in March 2022, and MRA abdomen in April 2022.  The patient's apparent vigorous good health argues strongly against the presence of untreated or progressive systemic inflammatory disease such as vasculitis, malignancy, or chronic infection like TB.  Most likely explanation for the imaging abnormality remains old/healed aortic  "inflammation, that may have occurred following distant infection.     No treatment for infection, and no treatment for inflammation is warranted presently.  I do not think that routine repeat imaging of the aorta is warranted at present.  There is no need for restriction on physical activity, diet, or travel at this time. Plan to monitor for development of systemic inflammatory disease symptoms (fever, weight loss, abdominal pain, lower extremity pain with exertion).    # History of tuberculosis, latent, treated in 2006 per patient report, positive QuantiFERON gold test March 2022.  Most likely, persistently positive tuberculosis test in March 2022 is due to immune system reactivity developed years ago after TB exposure.  There is no evidence of active TB now.    #Epigastric pain: Association with eating points towards gastric or esophageal pain generator.  I recommend trial of omeprazole 20 mg daily for 30 days.  Follow-up with primary care if no improvement.      Plan:  1.  No activity restrictions; Followup with primary care doctor if intermittent right chest wall pain worsens.  2.  Report to rheumatology or primary care recurrence of abdominal pain, or of leg/thigh pain that occurs with exercise.  3.  No treatment needed for aorta abnormality today.    RTC 12 mos    Omid Taveras MD  Staff Rheumatologist, [facility]           History of Present Illness:   Stephon Wolfe presents for follow-up of abnormal aorta radiographic findings. Last seen in November 2022.  Impression at that time was of aortic wall thickening noted on CT scan in March 2022 and MRA of the abdomen in April 2022.  Patient also had history of positive TB test, treated in 2006.  Plan was for watchful waiting, and repeat MRI of the aorta in 6 months.    Interval history November 17, 2023    H he \"appenditcitis\" in 8-2023. Treated with Abx. No further abd pain. Occasionally has R side pain, associated with running. No N/V./D. appetite " "good.  Knee pain is better; he is playing sports.    Patient denies fevers chills sweats low appetite, fatigue, cough, shortness of breath, chest pain, joint pain, numbness weakness or tingling, skin rash.  He does note occasional right flank pain, made worse with taking a deep breath or moving.      Interval history November 25, 2022    [place] .  He reports enjoying vigorous good health while overseas.  He reports \"heart pain\", like \"something heavy\" on the L side of chest, occurs  When sleeping, sometimes when walking. No SOB, no nausea , no HA. Sensation goes away in 5-30 minutes.  He had cough and fever 2 weeks ago; symptoms are improved.  He notes L > R knee pain; pain is severe enough that he has not played sports.  Otherwise, he feels well.  Good energy level, good appetite, and no restriction in activities based on abdominal pain.    History, May 17, 2022, post hospital discharge.  Patient was encountered by rheumatology in the emergency room in mid March 2022.  Impression at that time was of an acute episode of gastroenteritis/dysentery, and a coincidental abdominal CT finding of aortic wall thickening:      \"...Syndrome of acute gastroenteritis/dysentery is associated with segmental abdominal aortic wall thickening, but no correlates of systemic rheumatic disease, toxin, infection, or even of systemic inflammation are present. Healed/old aortic inflammatory disease is highest on the differential diagnosis of the aortic abnormalities seen on CT scan. MRI with contrast will be more sensitive to inflammatory changes in the aortic endothelium. Therapy may be withheld pending laboratory workup for infection, IgG4-related disease, and while awaiting followup imaging.\"    Patient was hospitalized for observation for 5 days and discharged on March 17, 2022.  Discharge summary listed following benign hospital course:    \" ...26 year old male with a past medical history significant for " "prediabetes, tension headaches, back pain, hip and knee pain, and bipolar disorder admitted from the ED on 3/12/2022 for emesis and abdominal pain. 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 MRA abdomen confirmed abdominal aortitis, while MRA chest indicated normal physiology with no inflammation in the proximal aorta/subclavian/carotic arteries. Rheumatology consulted and overall work up was negative, except patient found to be partially IgA deficient. Broad ID workup: TB Quantiferon positive for latent TB, and Beta D Glucan also positive. Fungal blood culture pending, and ID will follow up outpatient with results. Suspicion for fungal infection very low, since patient blood cultures were negative. Aortitis DDx includes Takayasu arteritis vs. Aortitis secondary to TB vs. Idiopathic aortitis. ID suggesting patient be treated for latent TB, however, he reports prior treatment (around 2006). Patient and family still searching for documentation confirming TB treatment though discussed with ID who feels no need to repeat treatment and that current evidence for treatment is sufficient...\"    Visit conducted through an .    Today, the patient states that he is feeling well.  He has had no recurrence of the abdominal pain that brought him into the emergency room in March.  He has no fevers chills sweats weight loss, malaise abdominal pain, change in bowel habits, skin rash, joint pain, numbness weakness or tingling in arms or legs, cough, shortness of breath, or back pain.    He has not been engaging in vigorous physical exercise, because he understood that he should restrict physical activity while awaiting follow-up of the aortic abnormality.  He reports today that he is eager to resume playing soccer, and wants to know that it will be safe for him to undertake a planned 3-month trip to [facility] starting in late May 2022.           Review " of Systems:     Constitutional: negative  Skin: negative  Eyes: negative  Ears/Nose/Throat: negative  Respiratory: HPI  Cardiovascular: HPI  Gastrointestinal: 5 days ago, he had squeezing pain for 2 days in upper abdomen. No abd pain prior.  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  No past surgical history on file.         Social History:     Social History     Socioeconomic History    Marital status: Single     Spouse name: Not on file    Number of children: Not on file    Years of education: Not on file    Highest education level: Not on file   Occupational History    Not on file   Tobacco Use    Smoking status: Never    Smokeless tobacco: Never   Substance and Sexual Activity    Alcohol use: No    Drug use: No    Sexual activity: Not Currently   Other Topics Concern    Not on file   Social History Narrative    Not on file     Social Determinants of Health     Financial Resource Strain: Not on file   Food Insecurity: Not on file   Transportation Needs: Not on file   Physical Activity: Not on file   Stress: Not on file   Social Connections: Not on file   Interpersonal Safety: Not on file   Housing Stability: Not on file          Family History:   No family history on file.         Allergies:   No Known Allergies         Medications:     Current Outpatient Medications   Medication Sig Dispense Refill    acetaminophen (TYLENOL) 325 MG tablet Take 325-650 mg by mouth every 6 hours as needed for mild pain      benzonatate  "(TESSALON) 100 MG capsule Take 1 capsule (100 mg) by mouth 3 times daily as needed for cough (Patient not taking: Reported on 10/4/2023) 15 capsule 0    cholecalciferol 125 MCG (5000 UT) CAPS  (Patient not taking: Reported on 10/4/2023)      doxylamine (UNISOM) 25 MG TABS tablet Take 1 tablet (25 mg) by mouth nightly as needed for sleep (Patient not taking: Reported on 10/4/2023) 15 tablet 0    ergocalciferol (ERGOCALCIFEROL) 1.25 MG (34597 UT) capsule daily. (Patient not taking: Reported on 10/4/2023)      ibuprofen (ADVIL/MOTRIN) 600 MG tablet Take 1 tablet (600 mg) by mouth every 6 hours as needed for moderate pain (Patient not taking: Reported on 10/4/2023) 30 tablet 0    melatonin 3 MG tablet Take 1 tablet (3 mg) by mouth nightly as needed for sleep (Patient not taking: Reported on 11/17/2023) 30 tablet 1    naproxen (NAPROSYN) 500 MG tablet Take 1 tablet (500 mg) by mouth 2 times daily as needed for moderate pain (4-6) (Patient not taking: Reported on 10/4/2023) 30 tablet 0    omeprazole (PRILOSEC) 20 MG DR capsule Take 1 capsule (20 mg) by mouth daily (Patient not taking: Reported on 10/4/2023) 30 capsule 1    traZODone (DESYREL) 100 MG tablet Take 1 tablet (100 mg) by mouth At Bedtime (Patient not taking: Reported on 10/4/2023) 30 tablet 0    zolpidem (AMBIEN) 5 MG tablet Take 1 tablet (5 mg) by mouth nightly as needed for sleep (Patient not taking: Reported on 10/4/2023) 10 tablet 0            Physical Exam:   Blood pressure 113/80, pulse 84, height 1.74 m (5' 8.5\"), weight 69.9 kg (154 lb).  Wt Readings from Last 6 Encounters:   11/17/23 69.9 kg (154 lb)   10/04/23 72.6 kg (160 lb)   06/02/23 74.2 kg (163 lb 9.6 oz)   04/27/23 66.2 kg (146 lb)   11/25/22 69.9 kg (154 lb)   10/22/22 72.6 kg (160 lb)     Constitutional: well-developed, appearing stated age; cooperative  Eyes: nl EOM, PERRLA, vision, conjunctiva, sclera  ENT: nl external ears, nose, hearing, lips, teeth, gums, throat  No mucous membrane " lesions, normal saliva pool  Neck: no mass or thyroid enlargement  Resp: breathing unlabored, lungs CTA; mild chest wall tenderness in the mid axillary line on the right at the 10th rib.  Cor: RRR S1 S2  GI: no ABD mass; no abdominal tenderness; no HSM, no pulsatile mass palpable in the midline.  Dorsalis pedis pulses symmetrical and full.  Lymph: no cervical, supraclavicular, inguinal or epitrochlear nodes  MS: Normal range of motion in all upper extremity and lower extremity joints.  Skin: no nail pitting, alopecia, rash, nodules or lesions  Neuro: nl cranial nerves, strength, sensation, DTRs.   Psych: nl judgement, orientation, memory, affect.         Data:     @      Latest Ref Rng & Units 3/17/2022     9:36 AM 10/22/2022     9:44 PM 2/25/2023    11:14 PM   RHEUM RESULTS   Albumin 3.4 - 5.0 g/dL 3.9  3.9     ALT 0 - 70 U/L 19  18     AST 0 - 45 U/L 16  13     Creatinine 0.67 - 1.17 mg/dL 0.77  0.78  0.95    GFR Estimate >60 mL/min/1.73m2 >90  >90  >90    Hematocrit 40.0 - 53.0 % 44.5  41.9  42.8    Hemoglobin 13.3 - 17.7 g/dL 15.0  14.3  14.2    WBC 4.0 - 11.0 10e3/uL 6.0  5.5  7.7    RBC Count 4.40 - 5.90 10e6/uL 5.06  4.83  4.92    RDW 10.0 - 15.0 % 12.6  12.4  12.2    MCHC 31.5 - 36.5 g/dL 33.7  34.1  33.2    MCV 78 - 100 fL 88  87  87    Platelet Count 150 - 450 10e3/uL 215  229  222         ,  ,  ,  ,  ,  ,   ALEXANDER interpretation   Date Value Ref Range Status   03/12/2022 Negative Negative Final     Comment:       Negative:              <1:40  Borderline Positive:   1:40 - 1:80  Positive:              >1:80   ,  ,  ,  ,  ,  ,  ,  ,  ,   Hepatitis B Surface Antigen   Date Value Ref Range Status   03/12/2022 Nonreactive Nonreactive Final   ,  ,  ,  ,   Quantiferon-TB Gold Plus   Date Value Ref Range Status   03/13/2022 Positive (A) Negative Final     Comment:     Interferon gamma response to M.tuberculosis antigens was detected,suggesting infection with M.tuberculosis. Positive results in patients at low risk  for infection should be interpreted with caution and repeat testing on a new sample should be considered as recommended by the 2017 ATS/IDSA/CDC Clinical Prac  terry Guidelines for Diagnosis of Tuberculosis in Adults and Children      TB1 Ag minus Nil Value   Date Value Ref Range Status   03/13/2022 8.66 IU/mL Final     TB2 Ag minus Nil Value   Date Value Ref Range Status   03/13/2022 8.66 IU/mL Final     Mitogen minus Nil Result   Date Value Ref Range Status   03/13/2022 8.66 IU/mL Final     Nil Result   Date Value Ref Range Status   03/13/2022 1.34 IU/mL Final   ,  ,  ,  ,  ,  ,  ,  ,  ,  ,   Neutrophil Cytoplasmic Antibody   Date Value Ref Range Status   03/12/2022 <1:10 <1:10 Final   ,   Neutrophil Cytoplasmic Antibody Pattern   Date Value Ref Range Status   03/12/2022   Final    The ANCA IFA is <1:10.  No further testing will be performed.   ,  ,  ,  ,  ,  ,  ,   Immunoglobulin G   Date Value Ref Range Status   03/16/2022 1,445 610-1,616 mg/dL Final   03/16/2022 1,445 610-1,616 mg/dL Final     Immunoglobulin A   Date Value Ref Range Status   03/12/2022 45 (L) 84 - 499 mg/dL Final     Immunoglobulin M   Date Value Ref Range Status   03/12/2022 77 35 - 242 mg/dL Final     Narrative & Impression   MRA ABDOMEN WITH 3D AND MULTIPLANAR RECONSTRUCTIONS 4/25/2022 4:50 PM     CLINICAL HISTORY: Aortitis (H).      COMPARISONS: CT 3/12/2022     REFERRING PROVIDER: IRAIS NEAL     TECHNIQUE: Coronal T2 HASTE, TRUFISP and T1 3D VIBE PRE and axial  TRUFISP, T1 3D VIBE PRE, and 2DTOF sequences obtained. MIP  reconstructions made from the 2DTOF. Coronal FL3D angio sequences  obtained. Post contrast coronal and axial T1 3D VIBE sequences  obtained. Subtraction reconstructions produced.     3D and multiplanar reconstructions were produced.     FINDINGS: Aortic wall thickening again demonstrated from the level of  the renal arteries to the inferior mesenteric artery. Wall T2 signal  and enhancement. No periaortic fat stranding  or T2 signal.     No aortic stenosis or dissection.     Aorta:       Celiac origin: 15 mm       Infrarenal (including thickened wall): 18 mm       Inferior mesenteric origin: 12 mm       Above the bifurcation: 14 mm     Celiac, superior mesenteric, bilateral single renal, and inferior  mesenteric arteries are patent.     Circumaortic left renal artery retroaortic branch crosses behind the  aorta just above the level of the inferior mesenteric artery.     Right arterial and venous and left venous corona mortis were  demonstrated in the previous CT but not included in this study.     Left common iliac vein measures 2.7 mm in diameter between the right  common iliac artery and spine and 7.7 mm laterally for a 65% diameter  stenosis.                                                                      IMPRESSION:  1. Aortitis again demonstrated with enhancing wall thickening from the  renal origins to the inferior mesenteric artery. No periaortic fat  stranding, T2 signal, or enhancement.     2. 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 No thrombus demonstrated.     Reference:  Crispin L, Armandon U, Ulkrissyn S, Korene Z, Terthad F. Compression of the  left common iliac vein in asymptomatic subjects and patients with left  iliofemoral deep vein thrombosis. J Vasc Interv Radiol. 2008  Mar;19(3):366-70; quiz 371. doi: 10.1016/j.jvir.2007.09.007. PMID:  [id number].     Compression in excess of 70% can be helpful for identifying possible  underlying May Thurner Syndrome (iliac vein compression syndrome -  IVCS) in patients with a left lower extremity deep vein thrombosis.     HERMAN SUAREZ MD

## 2023-11-17 NOTE — PATIENT INSTRUCTIONS
Diagnosis:  1.  Aortic wall thickening, noted on CT scan in March 2022, and MR abdomen in April 2022:   The most likely explanation for the abnormality is old/healed aortic inflammation, that may have occurred following previous infection.  No treatment for infection, and no treatment for inflammation is warranted at this time.  2.  History of tuberculosis, latent, treated.  Most likely, persistently positive tuberculosis test in March 2022 is due to immune system reactivity to old TB. There is no evidence of active TB.  3. Chest/abdominal pain: Pain is occasional, and not likely related to stable mild anatomic change in the aorta.    Plan:  1.  No activity restrictions; Followup with primary care doctor for chest pain and for leg/knee pain.  2.  Report recurrence of abdominal pain, or of leg/thigh pain that occurs with exercise.  3.  No treatment needed for aorta abnormality today.

## 2023-11-20 ENCOUNTER — THERAPY VISIT (OUTPATIENT)
Dept: PHYSICAL THERAPY | Facility: CLINIC | Age: 28
End: 2023-11-20
Attending: STUDENT IN AN ORGANIZED HEALTH CARE EDUCATION/TRAINING PROGRAM
Payer: COMMERCIAL

## 2023-11-20 DIAGNOSIS — M62.89 PELVIC FLOOR DYSFUNCTION: ICD-10-CM

## 2023-11-20 DIAGNOSIS — R10.2 PELVIC PAIN IN MALE: Primary | ICD-10-CM

## 2023-11-20 DIAGNOSIS — N50.812 PAIN IN BOTH TESTICLES: ICD-10-CM

## 2023-11-20 DIAGNOSIS — N50.811 PAIN IN BOTH TESTICLES: ICD-10-CM

## 2023-11-20 PROCEDURE — 97140 MANUAL THERAPY 1/> REGIONS: CPT | Mod: GP

## 2023-11-20 PROCEDURE — 97110 THERAPEUTIC EXERCISES: CPT | Mod: GP

## 2023-11-20 PROCEDURE — 97112 NEUROMUSCULAR REEDUCATION: CPT | Mod: GP

## 2023-11-27 ENCOUNTER — HOSPITAL ENCOUNTER (EMERGENCY)
Facility: CLINIC | Age: 28
Discharge: HOME | End: 2023-11-27
Attending: EMERGENCY MEDICINE | Admitting: EMERGENCY MEDICINE
Payer: COMMERCIAL

## 2023-11-27 ENCOUNTER — APPOINTMENT (OUTPATIENT)
Dept: GENERAL RADIOLOGY | Facility: CLINIC | Age: 28
End: 2023-11-27
Attending: EMERGENCY MEDICINE
Payer: COMMERCIAL

## 2023-11-27 VITALS
SYSTOLIC BLOOD PRESSURE: 112 MMHG | WEIGHT: 164 LBS | OXYGEN SATURATION: 100 % | TEMPERATURE: 97.7 F | RESPIRATION RATE: 22 BRPM | DIASTOLIC BLOOD PRESSURE: 75 MMHG | HEART RATE: 58 BPM | BODY MASS INDEX: 24.57 KG/M2

## 2023-11-27 DIAGNOSIS — G47.09 OTHER INSOMNIA: ICD-10-CM

## 2023-11-27 DIAGNOSIS — R07.89 CHEST WALL PAIN: ICD-10-CM

## 2023-11-27 LAB
ALBUMIN SERPL BCG-MCNC: 5.2 G/DL (ref 3.5–5.2)
ALP SERPL-CCNC: 115 U/L (ref 40–150)
ALT SERPL W P-5'-P-CCNC: 14 U/L (ref 0–70)
ANION GAP SERPL CALCULATED.3IONS-SCNC: 9 MMOL/L (ref 7–15)
AST SERPL W P-5'-P-CCNC: 24 U/L (ref 0–45)
BASOPHILS # BLD AUTO: 0 10E3/UL (ref 0–0.2)
BASOPHILS NFR BLD AUTO: 0 %
BILIRUB SERPL-MCNC: 0.6 MG/DL
BUN SERPL-MCNC: 14.6 MG/DL (ref 6–20)
CALCIUM SERPL-MCNC: 9.8 MG/DL (ref 8.6–10)
CHLORIDE SERPL-SCNC: 99 MMOL/L (ref 98–107)
CREAT SERPL-MCNC: 0.82 MG/DL (ref 0.67–1.17)
DEPRECATED HCO3 PLAS-SCNC: 30 MMOL/L (ref 22–29)
EGFRCR SERPLBLD CKD-EPI 2021: >90 ML/MIN/1.73M2
EOSINOPHIL # BLD AUTO: 0.1 10E3/UL (ref 0–0.7)
EOSINOPHIL NFR BLD AUTO: 2 %
ERYTHROCYTE [DISTWIDTH] IN BLOOD BY AUTOMATED COUNT: 11.9 % (ref 10–15)
FLUAV RNA SPEC QL NAA+PROBE: NEGATIVE
FLUBV RNA RESP QL NAA+PROBE: NEGATIVE
GLUCOSE SERPL-MCNC: 98 MG/DL (ref 70–99)
HCT VFR BLD AUTO: 43.5 % (ref 40–53)
HGB BLD-MCNC: 15.5 G/DL (ref 13.3–17.7)
IMM GRANULOCYTES # BLD: 0 10E3/UL
IMM GRANULOCYTES NFR BLD: 0 %
LYMPHOCYTES # BLD AUTO: 3 10E3/UL (ref 0.8–5.3)
LYMPHOCYTES NFR BLD AUTO: 44 %
MCH RBC QN AUTO: 30.2 PG (ref 26.5–33)
MCHC RBC AUTO-ENTMCNC: 35.6 G/DL (ref 31.5–36.5)
MCV RBC AUTO: 85 FL (ref 78–100)
MONOCYTES # BLD AUTO: 0.6 10E3/UL (ref 0–1.3)
MONOCYTES NFR BLD AUTO: 9 %
NEUTROPHILS # BLD AUTO: 3.1 10E3/UL (ref 1.6–8.3)
NEUTROPHILS NFR BLD AUTO: 45 %
NRBC # BLD AUTO: 0 10E3/UL
NRBC BLD AUTO-RTO: 0 /100
PLATELET # BLD AUTO: 226 10E3/UL (ref 150–450)
POTASSIUM SERPL-SCNC: 3.6 MMOL/L (ref 3.4–5.3)
PROT SERPL-MCNC: 8.5 G/DL (ref 6.4–8.3)
RBC # BLD AUTO: 5.14 10E6/UL (ref 4.4–5.9)
RSV RNA SPEC NAA+PROBE: NEGATIVE
SARS-COV-2 RNA RESP QL NAA+PROBE: NEGATIVE
SODIUM SERPL-SCNC: 138 MMOL/L (ref 135–145)
TROPONIN T SERPL HS-MCNC: <6 NG/L
WBC # BLD AUTO: 6.9 10E3/UL (ref 4–11)

## 2023-11-27 PROCEDURE — 87637 SARSCOV2&INF A&B&RSV AMP PRB: CPT | Performed by: EMERGENCY MEDICINE

## 2023-11-27 PROCEDURE — 96374 THER/PROPH/DIAG INJ IV PUSH: CPT | Performed by: EMERGENCY MEDICINE

## 2023-11-27 PROCEDURE — 71046 X-RAY EXAM CHEST 2 VIEWS: CPT

## 2023-11-27 PROCEDURE — 85004 AUTOMATED DIFF WBC COUNT: CPT | Performed by: EMERGENCY MEDICINE

## 2023-11-27 PROCEDURE — 93010 ELECTROCARDIOGRAM REPORT: CPT | Performed by: EMERGENCY MEDICINE

## 2023-11-27 PROCEDURE — 99284 EMERGENCY DEPT VISIT MOD MDM: CPT | Mod: 25 | Performed by: EMERGENCY MEDICINE

## 2023-11-27 PROCEDURE — 84484 ASSAY OF TROPONIN QUANT: CPT | Performed by: EMERGENCY MEDICINE

## 2023-11-27 PROCEDURE — 93005 ELECTROCARDIOGRAM TRACING: CPT | Performed by: EMERGENCY MEDICINE

## 2023-11-27 PROCEDURE — 250N000013 HC RX MED GY IP 250 OP 250 PS 637: Performed by: EMERGENCY MEDICINE

## 2023-11-27 PROCEDURE — 36415 COLL VENOUS BLD VENIPUNCTURE: CPT | Performed by: EMERGENCY MEDICINE

## 2023-11-27 PROCEDURE — 250N000011 HC RX IP 250 OP 636: Mod: JZ | Performed by: EMERGENCY MEDICINE

## 2023-11-27 PROCEDURE — 82310 ASSAY OF CALCIUM: CPT | Performed by: EMERGENCY MEDICINE

## 2023-11-27 PROCEDURE — 99285 EMERGENCY DEPT VISIT HI MDM: CPT | Mod: 25 | Performed by: EMERGENCY MEDICINE

## 2023-11-27 RX ORDER — ACETAMINOPHEN 500 MG
1000 TABLET ORAL ONCE
Status: COMPLETED | OUTPATIENT
Start: 2023-11-27 | End: 2023-11-27

## 2023-11-27 RX ORDER — DIPHENHYDRAMINE HCL 25 MG
25 TABLET ORAL EVERY 6 HOURS PRN
Qty: 30 TABLET | Refills: 0 | Status: SHIPPED | OUTPATIENT
Start: 2023-11-27

## 2023-11-27 RX ORDER — KETOROLAC TROMETHAMINE 15 MG/ML
15 INJECTION, SOLUTION INTRAMUSCULAR; INTRAVENOUS ONCE
Status: COMPLETED | OUTPATIENT
Start: 2023-11-27 | End: 2023-11-27

## 2023-11-27 RX ORDER — ASPIRIN 81 MG/1
324 TABLET, CHEWABLE ORAL ONCE
Status: COMPLETED | OUTPATIENT
Start: 2023-11-27 | End: 2023-11-27

## 2023-11-27 RX ADMIN — ASPIRIN 81 MG CHEWABLE TABLET 324 MG: 81 TABLET CHEWABLE at 03:16

## 2023-11-27 RX ADMIN — KETOROLAC TROMETHAMINE 15 MG: 15 INJECTION, SOLUTION INTRAMUSCULAR; INTRAVENOUS at 04:58

## 2023-11-27 RX ADMIN — ACETAMINOPHEN 1000 MG: 500 TABLET ORAL at 03:33

## 2023-11-27 ASSESSMENT — ACTIVITIES OF DAILY LIVING (ADL)
ADLS_ACUITY_SCORE: 35
ADLS_ACUITY_SCORE: 33

## 2023-11-27 NOTE — DISCHARGE INSTRUCTIONS
Please follow-up with your primary care provider soon as you can to discuss your insomnia    You can try Benadryl at home, 25 to 50 mg at night to help you sleep    If Stephon has discomfort from fever or other pain, he can have:  Acetaminophen (Tylenol) every 6 hours as needed. His dose is:    2 regular strength tabs (650 mg)                                     (43.2+ kg/96+ lb)    NOTE: If your acetaminophen (Tylenol) came with a dropper marked with 0.4 and 0.8 ml, call us ([phone]) or check with your doctor about the dose before using it.     AND/OR      Ibuprofen (Advil, Motrin) every 6 hours as needed. His/her dose is:    1 tab of the 400 mg prescription tabs                                                                  (40-60 kg/ lb)

## 2023-11-27 NOTE — ED TRIAGE NOTES
Triage Assessment (Adult)       Row Name 11/27/23 0304          Triage Assessment    Airway WDL WDL        Respiratory WDL    Respiratory WDL WDL        Skin Circulation/Temperature WDL    Skin Circulation/Temperature WDL WDL        Cardiac WDL    Cardiac WDL WDL        Peripheral/Neurovascular WDL    Peripheral Neurovascular WDL WDL        Cognitive/Neuro/Behavioral WDL    Cognitive/Neuro/Behavioral WDL WDL

## 2023-11-27 NOTE — ED PROVIDER NOTES
ED Provider Note  [facility]      History     Chief Complaint   Patient presents with    Generalized Body Aches     Pt c/o body aches     Headache    Insomnia    Chest Pain     Pt c/o HA, body aches, CP and insomnia x 2 weeks. Denies cough. Pt coughing in triage     HPI  Stephon Wolfe is a 27 year old male who is presenting with 2 weeks of symptoms.  He is mainly concerned about insomnia.  Reports he has not slept in 10 days.  He also reports diffuse body aches including headache for 2 weeks.  It is not terrible he says.  He does report some chest pain with it but this is not a major component.  He denies any radiation of chest pain.  No exertional worsening of his chest pain.  Denies shortness of breath.  He denies diaphoresis or vomiting.  No leg pain or swelling.  No recent travel.  He has had a mild nonproductive cough.  He is denying fevers.  No history of DVT or PE.    Past Medical History  No past medical history on file.  No past surgical history on file.  acetaminophen (TYLENOL) 325 MG tablet  benzonatate (TESSALON) 100 MG capsule  cholecalciferol 125 MCG (5000 UT) CAPS  doxylamine (UNISOM) 25 MG TABS tablet  ergocalciferol (ERGOCALCIFEROL) 1.25 MG (55024 UT) capsule  ibuprofen (ADVIL/MOTRIN) 600 MG tablet  melatonin 3 MG tablet  naproxen (NAPROSYN) 500 MG tablet  omeprazole (PRILOSEC) 20 MG DR capsule  traZODone (DESYREL) 100 MG tablet  zolpidem (AMBIEN) 5 MG tablet      No Known Allergies  Family History  No family history on file.  Social History   Social History     Tobacco Use    Smoking status: Never    Smokeless tobacco: Never   Substance Use Topics    Alcohol use: No    Drug use: No         A medically appropriate review of systems was performed with pertinent positives and negatives noted in the HPI, and all other systems negative.    Physical Exam   BP: 117/81  Pulse: 59  Temp: 97.6  F (36.4  C)  Resp: 16  Weight: 74.4 kg (164 lb)  SpO2: 100 %  Physical  Exam  Physical Exam   Constitutional: oriented to person, place, and time. appears well-developed and well-nourished.   HENT:   Head: Normocephalic and atraumatic.   Neck: Normal range of motion.   Pulmonary/Chest: Effort normal. No respiratory distress.  Reproducible chest wall tenderness present.  Otherwise lungs clear.  Cardiac: No murmurs, rubs, gallops. RRR.  Abdominal: Abdomen soft, nontender, nondistended. No rebound tenderness.  MSK: Long bones without deformity or evidence of trauma.  No lower extreme edema.  No tenderness of the calves.  Compartments in the upper and lower extremity soft.  Neurological: alert and oriented to person, place, and time.   Skin: Skin is warm and dry.   Psychiatric:  normal mood and affect.  behavior is normal. Thought content normal.       ED Course, Procedures, & Data      Procedures            EKG Interpretation:      Interpreted by Saul Ryan MD  Time reviewed: 0300  Symptoms at time of EKG: chest pain   Rhythm: normal sinus   Rate: Normal  Axis: Normal  Ectopy: none  Conduction: Normal  ST Segments/ T Waves: Multiple leads with concave ST elevation without reciprocal changes similar to multiple prior EKGs  Q Waves: none  Comparison to prior: Unchanged    Clinical Impression: ST elevation present in precordial leads and lateral leads that is similar to multiple prior EKGs, no reciprocal changes                       Results for orders placed or performed during the hospital encounter of 11/27/23   EKG 12 lead     Status: None (Preliminary result)   Result Value Ref Range    Systolic Blood Pressure  mmHg    Diastolic Blood Pressure  mmHg    Ventricular Rate 55 BPM    Atrial Rate 55 BPM    TN Interval 136 ms    QRS Duration 100 ms     ms    QTc 376 ms    P Axis 53 degrees    R AXIS 51 degrees    T Axis 27 degrees    Interpretation ECG       Sinus bradycardia  ST elevation consider anterior injury or acute infarct  ** ** ACUTE MI / STEMI ** **  Abnormal ECG     CBC  with platelets differential     Status: None ()    Narrative    The following orders were created for panel order CBC with platelets differential.  Procedure                               Abnormality         Status                     ---------                               -----------         ------                     CBC with platelets and d...[[id number]]                                                   Please view results for these tests on the individual orders.     Medications   acetaminophen (TYLENOL) tablet 1,000 mg (has no administration in time range)   aspirin (ASA) chewable tablet 324 mg (324 mg Oral $Given 11/27/23 0316)     Labs Ordered and Resulted from Time of ED Arrival to Time of ED Departure - No data to display  No orders to display          Critical care was not performed.     Medical Decision Making  The patient's presentation was of moderate complexity (an acute illness with systemic symptoms).    The patient's evaluation involved:  review of 3+ test result(s) ordered prior to this encounter (multiple prior EKGs)  ordering and/or review of 3+ test(s) in this encounter (see separate area of note for details)    The patient's management necessitated moderate risk (prescription drug management including medications given in the ED) and moderate risk (limitations due to social determinants of health (language barrier, patient refused )).    Assessment & Plan    MDM  Patient presenting primarily with insomnia.  He does not have any psychiatric symptoms.  Do not feel that he needs behavioral assessment he does not want 1.  He occasionally tries melatonin.  Discussed he can try Benadryl which I will prescribe for him as well and that he will follow-up with his primary care provider.    Regarding the body aches these been going on for 2 weeks.  He appears quite well.  There is no focus of pain.  He has soft compartments.  He has good pulses.  He does not appear to have infectious ideology  or sepsis.  He does have some chest pain that is very reproducible.  EKG was labeled as acute STEMI by the computer however it is identical to multiple prior EKGs, it is concave upwards and he does not have diaphoresis, exertional worsening, radiation to the shoulders or vomiting.  Troponin is undetectable.  Very likely ACS.  The patient does not have symptoms of a DVT or PE, will not work this up.  Viral testing is negative.  Overall he appears quite well nontoxic, vitals are stable.  He knows he can return if any of his symptoms are worsening.  Most likely has some sort of viral process causing his symptoms     I have reviewed the nursing notes. I have reviewed the findings, diagnosis, plan and need for follow up with the patient.    New Prescriptions    DIPHENHYDRAMINE (BENADRYL) 25 MG TABLET    Take 1 tablet (25 mg) by mouth every 6 hours as needed for itching or sleep       Final diagnoses:   Other insomnia   Chest wall pain       Saul Ryan  [facility] EMERGENCY DEPARTMENT  11/27/2023     Saul Ryan MD  11/27/23 8142

## 2023-11-28 LAB
ATRIAL RATE - MUSE: 55 BPM
DIASTOLIC BLOOD PRESSURE - MUSE: NORMAL MMHG
INTERPRETATION ECG - MUSE: NORMAL
P AXIS - MUSE: 53 DEGREES
PR INTERVAL - MUSE: 136 MS
QRS DURATION - MUSE: 100 MS
QT - MUSE: 394 MS
QTC - MUSE: 376 MS
R AXIS - MUSE: 51 DEGREES
SYSTOLIC BLOOD PRESSURE - MUSE: NORMAL MMHG
T AXIS - MUSE: 27 DEGREES
VENTRICULAR RATE- MUSE: 55 BPM

## 2023-12-20 ENCOUNTER — THERAPY VISIT (OUTPATIENT)
Dept: PHYSICAL THERAPY | Facility: CLINIC | Age: 28
End: 2023-12-20
Attending: STUDENT IN AN ORGANIZED HEALTH CARE EDUCATION/TRAINING PROGRAM
Payer: COMMERCIAL

## 2023-12-20 DIAGNOSIS — N50.812 PAIN IN BOTH TESTICLES: ICD-10-CM

## 2023-12-20 DIAGNOSIS — M62.89 PELVIC FLOOR DYSFUNCTION: ICD-10-CM

## 2023-12-20 DIAGNOSIS — R10.2 PELVIC PAIN IN MALE: Primary | ICD-10-CM

## 2023-12-20 DIAGNOSIS — N50.811 PAIN IN BOTH TESTICLES: ICD-10-CM

## 2023-12-20 PROCEDURE — 97112 NEUROMUSCULAR REEDUCATION: CPT | Mod: GP

## 2023-12-20 PROCEDURE — 97140 MANUAL THERAPY 1/> REGIONS: CPT | Mod: GP

## 2023-12-20 PROCEDURE — 97110 THERAPEUTIC EXERCISES: CPT | Mod: GP

## 2024-01-08 ENCOUNTER — THERAPY VISIT (OUTPATIENT)
Dept: PHYSICAL THERAPY | Facility: CLINIC | Age: 29
End: 2024-01-08
Payer: COMMERCIAL

## 2024-01-08 DIAGNOSIS — N50.811 PAIN IN BOTH TESTICLES: ICD-10-CM

## 2024-01-08 DIAGNOSIS — N50.812 PAIN IN BOTH TESTICLES: ICD-10-CM

## 2024-01-08 DIAGNOSIS — M62.89 PELVIC FLOOR DYSFUNCTION: ICD-10-CM

## 2024-01-08 DIAGNOSIS — R10.2 PELVIC PAIN IN MALE: Primary | ICD-10-CM

## 2024-01-08 PROCEDURE — 97140 MANUAL THERAPY 1/> REGIONS: CPT | Mod: GP

## 2024-01-08 PROCEDURE — 97110 THERAPEUTIC EXERCISES: CPT | Mod: GP

## 2024-01-08 PROCEDURE — 97112 NEUROMUSCULAR REEDUCATION: CPT | Mod: GP

## 2024-04-05 ENCOUNTER — HOSPITAL ENCOUNTER (EMERGENCY)
Facility: CLINIC | Age: 29
Discharge: HOME | End: 2024-04-05
Attending: EMERGENCY MEDICINE | Admitting: EMERGENCY MEDICINE

## 2024-04-05 VITALS
HEIGHT: 70 IN | BODY MASS INDEX: 22.19 KG/M2 | TEMPERATURE: 97.3 F | RESPIRATION RATE: 22 BRPM | HEART RATE: 67 BPM | WEIGHT: 155 LBS | DIASTOLIC BLOOD PRESSURE: 86 MMHG | OXYGEN SATURATION: 100 % | SYSTOLIC BLOOD PRESSURE: 114 MMHG

## 2024-04-05 DIAGNOSIS — R07.89 ATYPICAL CHEST PAIN: ICD-10-CM

## 2024-04-05 DIAGNOSIS — R51.9 OCCIPITAL HEADACHE: ICD-10-CM

## 2024-04-05 LAB
ANION GAP SERPL CALCULATED.3IONS-SCNC: 10 MMOL/L (ref 7–15)
ATRIAL RATE - MUSE: 69 BPM
BASOPHILS # BLD AUTO: 0 10E3/UL (ref 0–0.2)
BASOPHILS NFR BLD AUTO: 0 %
BUN SERPL-MCNC: 16.4 MG/DL (ref 6–20)
CALCIUM SERPL-MCNC: 9.3 MG/DL (ref 8.6–10)
CHLORIDE SERPL-SCNC: 101 MMOL/L (ref 98–107)
CREAT SERPL-MCNC: 0.97 MG/DL (ref 0.67–1.17)
DEPRECATED HCO3 PLAS-SCNC: 27 MMOL/L (ref 22–29)
DIASTOLIC BLOOD PRESSURE - MUSE: NORMAL MMHG
EGFRCR SERPLBLD CKD-EPI 2021: >90 ML/MIN/1.73M2
EOSINOPHIL # BLD AUTO: 0.1 10E3/UL (ref 0–0.7)
EOSINOPHIL NFR BLD AUTO: 1 %
ERYTHROCYTE [DISTWIDTH] IN BLOOD BY AUTOMATED COUNT: 12.8 % (ref 10–15)
GLUCOSE SERPL-MCNC: 84 MG/DL (ref 70–99)
HCT VFR BLD AUTO: 43.2 % (ref 40–53)
HGB BLD-MCNC: 14.5 G/DL (ref 13.3–17.7)
HOLD SPECIMEN: NORMAL
IMM GRANULOCYTES # BLD: 0 10E3/UL
IMM GRANULOCYTES NFR BLD: 0 %
INTERPRETATION ECG - MUSE: NORMAL
LYMPHOCYTES # BLD AUTO: 2.1 10E3/UL (ref 0.8–5.3)
LYMPHOCYTES NFR BLD AUTO: 40 %
MCH RBC QN AUTO: 29.9 PG (ref 26.5–33)
MCHC RBC AUTO-ENTMCNC: 33.6 G/DL (ref 31.5–36.5)
MCV RBC AUTO: 89 FL (ref 78–100)
MONOCYTES # BLD AUTO: 0.5 10E3/UL (ref 0–1.3)
MONOCYTES NFR BLD AUTO: 9 %
NEUTROPHILS # BLD AUTO: 2.6 10E3/UL (ref 1.6–8.3)
NEUTROPHILS NFR BLD AUTO: 50 %
NRBC # BLD AUTO: 0 10E3/UL
NRBC BLD AUTO-RTO: 0 /100
P AXIS - MUSE: 49 DEGREES
PLATELET # BLD AUTO: 205 10E3/UL (ref 150–450)
POTASSIUM SERPL-SCNC: 3.7 MMOL/L (ref 3.4–5.3)
PR INTERVAL - MUSE: 130 MS
QRS DURATION - MUSE: 100 MS
QT - MUSE: 358 MS
QTC - MUSE: 383 MS
R AXIS - MUSE: 58 DEGREES
RBC # BLD AUTO: 4.85 10E6/UL (ref 4.4–5.9)
SODIUM SERPL-SCNC: 138 MMOL/L (ref 135–145)
SYSTOLIC BLOOD PRESSURE - MUSE: NORMAL MMHG
T AXIS - MUSE: 37 DEGREES
TROPONIN T SERPL HS-MCNC: 6 NG/L
VENTRICULAR RATE- MUSE: 69 BPM
WBC # BLD AUTO: 5.4 10E3/UL (ref 4–11)

## 2024-04-05 PROCEDURE — 80048 BASIC METABOLIC PNL TOTAL CA: CPT | Performed by: EMERGENCY MEDICINE

## 2024-04-05 PROCEDURE — 250N000011 HC RX IP 250 OP 636: Performed by: EMERGENCY MEDICINE

## 2024-04-05 PROCEDURE — 93005 ELECTROCARDIOGRAM TRACING: CPT | Performed by: EMERGENCY MEDICINE

## 2024-04-05 PROCEDURE — 36415 COLL VENOUS BLD VENIPUNCTURE: CPT | Performed by: EMERGENCY MEDICINE

## 2024-04-05 PROCEDURE — 96374 THER/PROPH/DIAG INJ IV PUSH: CPT | Performed by: EMERGENCY MEDICINE

## 2024-04-05 PROCEDURE — 93010 ELECTROCARDIOGRAM REPORT: CPT | Performed by: EMERGENCY MEDICINE

## 2024-04-05 PROCEDURE — 84484 ASSAY OF TROPONIN QUANT: CPT | Performed by: EMERGENCY MEDICINE

## 2024-04-05 PROCEDURE — 99284 EMERGENCY DEPT VISIT MOD MDM: CPT | Mod: 25 | Performed by: EMERGENCY MEDICINE

## 2024-04-05 PROCEDURE — 85025 COMPLETE CBC W/AUTO DIFF WBC: CPT | Performed by: EMERGENCY MEDICINE

## 2024-04-05 PROCEDURE — 93005 ELECTROCARDIOGRAM TRACING: CPT | Mod: RTG

## 2024-04-05 RX ORDER — KETOROLAC TROMETHAMINE 30 MG/ML
30 INJECTION, SOLUTION INTRAMUSCULAR; INTRAVENOUS ONCE
Status: COMPLETED | OUTPATIENT
Start: 2024-04-05 | End: 2024-04-05

## 2024-04-05 RX ADMIN — KETOROLAC TROMETHAMINE 30 MG: 30 INJECTION, SOLUTION INTRAMUSCULAR at 02:01

## 2024-04-05 ASSESSMENT — COLUMBIA-SUICIDE SEVERITY RATING SCALE - C-SSRS
1. IN THE PAST MONTH, HAVE YOU WISHED YOU WERE DEAD OR WISHED YOU COULD GO TO SLEEP AND NOT WAKE UP?: NO
2. HAVE YOU ACTUALLY HAD ANY THOUGHTS OF KILLING YOURSELF IN THE PAST MONTH?: NO
6. HAVE YOU EVER DONE ANYTHING, STARTED TO DO ANYTHING, OR PREPARED TO DO ANYTHING TO END YOUR LIFE?: NO

## 2024-04-05 ASSESSMENT — ACTIVITIES OF DAILY LIVING (ADL)
ADLS_ACUITY_SCORE: 35

## 2024-04-05 NOTE — DISCHARGE INSTRUCTIONS
Take ibuprofen 600 mg every 6 hours with food as needed for pain.    Follow-up with your primary care clinic as needed if ongoing symptoms.    Return if any concerns.

## 2024-04-05 NOTE — ED PROVIDER NOTES
[facility] EMERGENCY DEPARTMENT ([facility])    4/05/24      ED PROVIDER NOTE    History     Chief Complaint   Patient presents with    Chest Pain     R side chest pain started around 11pm while pt was laying down trying to sleep. Pain increases with DP and palp.    Headache     Occipital HA/neck pain for past few days.     HPI  Stephon Wolfe is a 28 year old male with PMH notable for bipolar 1 disorder, asthma who presents to the Emergency Department with chest pain and headache.  The patient states that he has had a mild pain in the occipital region of his head for the past 3 to 4 days.  He denies any photophobia.  No diplopia.  No nausea or vomiting.  No fever.  No weakness or numbness.  No difficulty walking or talking.  Patient also states that he had a sharp right parasternal chest pain that lasted about 1 hour prior to coming to the emergency department.  He states that has resolved.  He denies any associated shortness of breath.  No cough.  No dyspnea.  No leg pain or swelling.    Past Medical History  History reviewed. No pertinent past medical history.  History reviewed. No pertinent surgical history.  acetaminophen (TYLENOL) 325 MG tablet  benzonatate (TESSALON) 100 MG capsule  cholecalciferol 125 MCG (5000 UT) CAPS  diphenhydrAMINE (BENADRYL) 25 MG tablet  doxylamine (UNISOM) 25 MG TABS tablet  ergocalciferol (ERGOCALCIFEROL) 1.25 MG (56401 UT) capsule  ibuprofen (ADVIL/MOTRIN) 600 MG tablet  melatonin 3 MG tablet  naproxen (NAPROSYN) 500 MG tablet  omeprazole (PRILOSEC) 20 MG DR capsule  traZODone (DESYREL) 100 MG tablet  zolpidem (AMBIEN) 5 MG tablet      No Known Allergies  Family History  History reviewed. No pertinent family history.  Social History   Social History     Tobacco Use    Smoking status: Never    Smokeless tobacco: Never   Substance Use Topics    Alcohol use: No    Drug use: No         A complete review of systems was performed with pertinent positives and negatives noted  "in the HPI, and all other systems negative.    Physical Exam   BP: 122/70  Pulse: 72  Temp: 97.3  F (36.3  C)  Resp: 16  Height: 177.8 cm (5' 10\")  Weight: 70.5 kg (155 lb 8 oz)  SpO2: 100 %  Physical Exam  Vitals and nursing note reviewed.   Constitutional:       General: He is not in acute distress.     Appearance: He is well-developed. He is not diaphoretic.   HENT:      Head: Atraumatic.   Eyes:      General: No scleral icterus.     Pupils: Pupils are equal, round, and reactive to light.   Cardiovascular:      Rate and Rhythm: Normal rate and regular rhythm.      Heart sounds: Normal heart sounds.   Pulmonary:      Effort: No respiratory distress.      Breath sounds: Normal breath sounds.   Abdominal:      General: Bowel sounds are normal.      Palpations: Abdomen is soft.      Tenderness: There is no abdominal tenderness.   Musculoskeletal:         General: No tenderness. Normal range of motion.   Skin:     General: Skin is warm.      Findings: No rash.   Neurological:      General: No focal deficit present.      Mental Status: He is alert.      Cranial Nerves: No cranial nerve deficit.      Motor: No weakness.      Coordination: Coordination is intact.           ED Course, Procedures, & Data      Reviewed emergency department encounter from 11/27/2023 for insomnia and chest pain.  Reviewed EKG from that encounter.  Reviewed comprehensive metabolic profile, troponin, and CBC from that encounter.  Reviewed chest radiograph from that encounter.    Procedures            EKG Interpretation:      Interpreted by PROSPER CRAVEN MD, MD  Time reviewed: 0050  Symptoms at time of EKG: None   Rhythm: normal sinus   Rate: 69  Axis: Normal  Ectopy: none  Conduction: normal  ST Segments/ T Waves: Non-specific ST-T wave changes  Q Waves: none  Comparison to prior: Unchanged    Clinical Impression: no acute changes    Results for orders placed or performed during the hospital encounter of 04/05/24   [place] Draw     Status: None "    Narrative    The following orders were created for panel order [place] Draw.  Procedure                               Abnormality         Status                     ---------                               -----------         ------                     Extra Blue Top Tube[[id number]]                              Final result               Extra Green Top (Lithium...[[id number]]                      Final result               Extra Purple Top Tube[[id number]]                            Final result                 Please view results for these tests on the individual orders.   Extra Blue Top Tube     Status: None   Result Value Ref Range    Hold Specimen JIC    Extra Green Top (Lithium Heparin) Tube     Status: None   Result Value Ref Range    Hold Specimen jic    Extra Purple Top Tube     Status: None   Result Value Ref Range    Hold Specimen JIC    Basic metabolic panel     Status: Normal   Result Value Ref Range    Sodium 138 135 - 145 mmol/L    Potassium 3.7 3.4 - 5.3 mmol/L    Chloride 101 98 - 107 mmol/L    Carbon Dioxide (CO2) 27 22 - 29 mmol/L    Anion Gap 10 7 - 15 mmol/L    Urea Nitrogen 16.4 6.0 - 20.0 mg/dL    Creatinine 0.97 0.67 - 1.17 mg/dL    GFR Estimate >90 >60 mL/min/1.73m2    Calcium 9.3 8.6 - 10.0 mg/dL    Glucose 84 70 - 99 mg/dL   Troponin T, High Sensitivity     Status: Normal   Result Value Ref Range    Troponin T, High Sensitivity 6 <=22 ng/L   CBC with platelets and differential     Status: None   Result Value Ref Range    WBC Count 5.4 4.0 - 11.0 10e3/uL    RBC Count 4.85 4.40 - 5.90 10e6/uL    Hemoglobin 14.5 13.3 - 17.7 g/dL    Hematocrit 43.2 40.0 - 53.0 %    MCV 89 78 - 100 fL    MCH 29.9 26.5 - 33.0 pg    MCHC 33.6 31.5 - 36.5 g/dL    RDW 12.8 10.0 - 15.0 %    Platelet Count 205 150 - 450 10e3/uL    % Neutrophils 50 %    % Lymphocytes 40 %    % Monocytes 9 %    % Eosinophils 1 %    % Basophils 0 %    % Immature Granulocytes 0 %    NRBCs per 100 WBC 0 <1 /100    Absolute Neutrophils 2.6  1.6 - 8.3 10e3/uL    Absolute Lymphocytes 2.1 0.8 - 5.3 10e3/uL    Absolute Monocytes 0.5 0.0 - 1.3 10e3/uL    Absolute Eosinophils 0.1 0.0 - 0.7 10e3/uL    Absolute Basophils 0.0 0.0 - 0.2 10e3/uL    Absolute Immature Granulocytes 0.0 <=0.4 10e3/uL    Absolute NRBCs 0.0 10e3/uL   EKG 12 lead     Status: None (Preliminary result)   Result Value Ref Range    Systolic Blood Pressure  mmHg    Diastolic Blood Pressure  mmHg    Ventricular Rate 69 BPM    Atrial Rate 69 BPM    ID Interval 130 ms    QRS Duration 100 ms     ms    QTc 383 ms    P Axis 49 degrees    R AXIS 58 degrees    T Axis 37 degrees    Interpretation ECG       Sinus rhythm  Nonspecific ST and T wave abnormality  Abnormal ECG     CBC with platelets differential     Status: None    Narrative    The following orders were created for panel order CBC with platelets differential.  Procedure                               Abnormality         Status                     ---------                               -----------         ------                     CBC with platelets and d...[[id number]]                      Final result                 Please view results for these tests on the individual orders.            Results for orders placed or performed during the hospital encounter of 04/05/24   [place] Draw     Status: None    Narrative    The following orders were created for panel order [place] Draw.  Procedure                               Abnormality         Status                     ---------                               -----------         ------                     Extra Blue Top Tube[[id number]]                              Final result               Extra Green Top (Lithium...[[id number]]                      Final result               Extra Purple Top Tube[[id number]]                            Final result                 Please view results for these tests on the individual orders.   Extra Blue Top Tube     Status: None   Result Value Ref Range     Hold Specimen JIC    Extra Green Top (Lithium Heparin) Tube     Status: None   Result Value Ref Range    Hold Specimen jic    Extra Purple Top Tube     Status: None   Result Value Ref Range    Hold Specimen JI    Basic metabolic panel     Status: Normal   Result Value Ref Range    Sodium 138 135 - 145 mmol/L    Potassium 3.7 3.4 - 5.3 mmol/L    Chloride 101 98 - 107 mmol/L    Carbon Dioxide (CO2) 27 22 - 29 mmol/L    Anion Gap 10 7 - 15 mmol/L    Urea Nitrogen 16.4 6.0 - 20.0 mg/dL    Creatinine 0.97 0.67 - 1.17 mg/dL    GFR Estimate >90 >60 mL/min/1.73m2    Calcium 9.3 8.6 - 10.0 mg/dL    Glucose 84 70 - 99 mg/dL   Troponin T, High Sensitivity     Status: Normal   Result Value Ref Range    Troponin T, High Sensitivity 6 <=22 ng/L   CBC with platelets and differential     Status: None   Result Value Ref Range    WBC Count 5.4 4.0 - 11.0 10e3/uL    RBC Count 4.85 4.40 - 5.90 10e6/uL    Hemoglobin 14.5 13.3 - 17.7 g/dL    Hematocrit 43.2 40.0 - 53.0 %    MCV 89 78 - 100 fL    MCH 29.9 26.5 - 33.0 pg    MCHC 33.6 31.5 - 36.5 g/dL    RDW 12.8 10.0 - 15.0 %    Platelet Count 205 150 - 450 10e3/uL    % Neutrophils 50 %    % Lymphocytes 40 %    % Monocytes 9 %    % Eosinophils 1 %    % Basophils 0 %    % Immature Granulocytes 0 %    NRBCs per 100 WBC 0 <1 /100    Absolute Neutrophils 2.6 1.6 - 8.3 10e3/uL    Absolute Lymphocytes 2.1 0.8 - 5.3 10e3/uL    Absolute Monocytes 0.5 0.0 - 1.3 10e3/uL    Absolute Eosinophils 0.1 0.0 - 0.7 10e3/uL    Absolute Basophils 0.0 0.0 - 0.2 10e3/uL    Absolute Immature Granulocytes 0.0 <=0.4 10e3/uL    Absolute NRBCs 0.0 10e3/uL   EKG 12 lead     Status: None (Preliminary result)   Result Value Ref Range    Systolic Blood Pressure  mmHg    Diastolic Blood Pressure  mmHg    Ventricular Rate 69 BPM    Atrial Rate 69 BPM    GA Interval 130 ms    QRS Duration 100 ms     ms    QTc 383 ms    P Axis 49 degrees    R AXIS 58 degrees    T Axis 37 degrees    Interpretation ECG       Sinus  rhythm  Nonspecific ST and T wave abnormality  Abnormal ECG     CBC with platelets differential     Status: None    Narrative    The following orders were created for panel order CBC with platelets differential.  Procedure                               Abnormality         Status                     ---------                               -----------         ------                     CBC with platelets and d...[[id number]]                      Final result                 Please view results for these tests on the individual orders.     Medications   ketorolac (TORADOL) injection 30 mg (30 mg Intravenous $Given 4/5/24 0208)     Labs Ordered and Resulted from Time of ED Arrival to Time of ED Departure   BASIC METABOLIC PANEL - Normal       Result Value    Sodium 138      Potassium 3.7      Chloride 101      Carbon Dioxide (CO2) 27      Anion Gap 10      Urea Nitrogen 16.4      Creatinine 0.97      GFR Estimate >90      Calcium 9.3      Glucose 84     TROPONIN T, HIGH SENSITIVITY - Normal    Troponin T, High Sensitivity 6     CBC WITH PLATELETS AND DIFFERENTIAL    WBC Count 5.4      RBC Count 4.85      Hemoglobin 14.5      Hematocrit 43.2      MCV 89      MCH 29.9      MCHC 33.6      RDW 12.8      Platelet Count 205      % Neutrophils 50      % Lymphocytes 40      % Monocytes 9      % Eosinophils 1      % Basophils 0      % Immature Granulocytes 0      NRBCs per 100 WBC 0      Absolute Neutrophils 2.6      Absolute Lymphocytes 2.1      Absolute Monocytes 0.5      Absolute Eosinophils 0.1      Absolute Basophils 0.0      Absolute Immature Granulocytes 0.0      Absolute NRBCs 0.0       No orders to display          Critical care was not performed.     Medical Decision Making  The patient's presentation was of moderate complexity (an undiagnosed new problem with uncertain diagnosis).    The patient's evaluation involved:  review of external note(s) from 1 sources (see separate area of note for details)  review of 3+ test  result(s) ordered prior to this encounter (see separate area of note for details)  ordering and/or review of 3+ test(s) in this encounter (see separate area of note for details)  independent interpretation of testing performed by another health professional (EKG interpreted by me with above results.)    The patient's management necessitated moderate risk (prescription drug management including medications given in the ED).    Assessment & Plan    28 year old male with history of bipolar disorder and asthma to the emergency department with now resolved right-sided chest pain and an occipital headache.  He has a normal exam including complete neurologic examination.  His EKG does not reveal any acute ischemic change and his troponin is normal so do not suspect ACS.  CBC and basic metabolic profile also normal.  His lungs are clear and oxygen saturations are normal so no clinical indication for chest radiograph with no ongoing symptoms.  He was given Toradol with resolution of his headache.  Suspect benign etiology.  Ibuprofen recommended for symptoms.  Patient dismissed from emergency department with plan for primary care follow-up.  Return precautions provided.    I have reviewed the nursing notes. I have reviewed the findings, diagnosis, plan and need for follow up with the patient.    Discharge Medication List as of 4/5/2024  2:43 AM          Final diagnoses:   Atypical chest pain   Occipital headache     Chart documentation was completed with Dragon voice-recognition software. Even though reviewed, this chart may still contain some grammatical, spelling, and word errors.         formerly [facility] EMERGENCY DEPARTMENT  4/5/2024     Will Diop MD  04/05/24 0338

## 2024-04-05 NOTE — ED TRIAGE NOTES
R side chest pain started around 11pm while pt was laying down trying to sleep. Pain increases with DP and palp.        Headache Occipital HA/neck pain for past few days.         Triage Assessment (Adult)       Row Name 04/05/24 0029          Triage Assessment    Airway WDL WDL        Respiratory WDL    Respiratory WDL WDL        Skin Circulation/Temperature WDL    Skin Circulation/Temperature WDL WDL        Cardiac WDL    Cardiac WDL X;chest pain        Chest Pain Assessment    Chest Pain Location anterior chest, right     Character aching     Precipitating Factors at rest        Peripheral/Neurovascular WDL    Peripheral Neurovascular WDL WDL        Cognitive/Neuro/Behavioral WDL    Cognitive/Neuro/Behavioral WDL WDL

## 2024-08-15 NOTE — PROGRESS NOTES
01/08/24 0500   Appointment Info   Signing clinician's name / credentials Ambreen Hampton, PT, DPT   Total/Authorized Visits 6 (PT )   Visits Used 4   Medical Diagnosis Pelvic pain in male  Pain in both testicles   PT Tx Diagnosis Pelvic pain in male Pain in both testicles, pelvic floor dysfunction   Quick Adds Pelvic Consent;Certification   Progress Note/Certification   Start of Care Date 11/15/23   Onset of illness/injury or Date of Surgery 10/04/23  (referral)   Therapy Frequency 1x/week tapering to every other week   Predicted Duration 10 weeks   Certification date from 11/15/23   Certification date to 01/24/24   Progress Note Completed Date 11/15/23       Present No   PT Goal 1   Goal Identifier LTG 1   Goal Description Pt will improve PF awareness and relaxation and reports testicular pain 3/10 in sitting for 30 min   Rationale to maximize safety and independence with performance of ADLs and functional tasks;to maximize safety and independence with transportation   Goal Progress currently pain 4/10 with sitting/standing   Target Date 01/24/24   Subjective Report   Subjective Report Overall PL 4/10 consistently. Notes foam rolling and HEP at gym do help with pain.   Objective Measures   Objective Measures Objective Measure 3   Objective Measure 1   Objective Measure impaired PFM mechanics - improved with cues for contraction   Details improved DBE mechanics with abdominal expansion and lower abdominal expansion   Objective Measure 2   Objective Measure Flexibility   Details mod restriction; hip flexor, adductors, quads, ER; min HS   Objective Measure 3   Objective Measure Pain   Details start of session 4/10; improved 3/10 by end of session   Treatment Interventions (PT)   Interventions Therapeutic Procedure/Exercise;Neuromuscular Re-education;Manual Therapy   Therapeutic Procedure/Exercise   Therapeutic Procedures: strength, endurance, ROM, flexibility minutes (11380) 15   Therapeutic  Procedures Ther Proc 2   Ther Proc 1 supine piriformis stretch   Ther Proc 1 - Details under 90d; x 1 min hold kelsi ; tc to prevent hip lifting off table   Ther Proc 2 90/90 hip mobility   Ther Proc 2 - Details increased tension at hip IR on RLE ; x 5 each with 20 sec hold; mobility improved by end of stetch - reports less tightness   PTRx Ther Proc 1  Hip Flexor Stretch Jose Luis Test Position   PTRx Ther Proc 1 - Details hold for 1-2 min both legs; 1-2x/day    PTRx Ther Proc 2 Pretzel Stretch   PTRx Ther Proc 2 - Details 2 x 1 min hold; both legs; take deep breaths as you hold   PTRx Ther Proc 3 Adductor Stretch   PTRx Ther Proc 3 - Details adductor rock back; mod restriction in adductors; 2 x 30 sec hold reports this decreases pain slightly   PTRx Ther Proc 4 Deep squat hold   PTRx Ther Proc 4 - Details held due to knee pain   Skilled Intervention see above   Patient Response/Progress tolerated well overall reports decrease in pain with exericses; continues to work on mobility and stretching at gym   Neuromuscular Re-education   Neuromuscular re-ed of mvmt, balance, coord, kinesthetic sense, posture, proprioception minutes (44738) 8   PTRx Neuro Re-ed 1 Diaphragmatic Breathing   PTRx Neuro Re-ed 1 - Details vc abdominal expansion ; and relaxation throughout; vc for inhale fill up balloon in stomach exhale let air out to improve mechanics and prevent dysfunctional breath pattern - this decreases pain -education to practice breathing when sit/stand for pain management.   PTRx Neuro Re-ed 2 Extended Bennie Pose   PTRx Neuro Re-ed 2 - Details with DBE   Skilled Intervention improve abdominal mobility; improve PF awareness and relaxation   Patient Response/Progress DBE mechanics improved with cues   Manual Therapy   Manual Therapy: Mobilization, MFR, MLD, friction massage minutes (43084) 14   Manual Therapy 1 MFR: abdominal stacking   Manual Therapy 1 - Details lower abdominal quadrants hypomobile in superior, medial and  lateral directions   Skilled Intervention improve mobility, decrease pain   Patient Response/Progress reports decrease  of symptoms with MFR to 4/10   Education   Learner/Method Patient;Listening;No Barriers to Learning   Plan   Home program print   Plan for next session MFR abdomen; hip mobility, LE strengthening   Comments   Pelvic Health Informed Consent Statement Discussed with patient/guardian reason for referral regarding pelvic health needs and external/internal pelvic floor muscle examination.  Opportunity provided to ask questions and verbal consent for assessment and intervention was given.   Total Session Time   Timed Code Treatment Minutes 37   Total Treatment Time (sum of timed and untimed services) 37         DISCHARGE  Reason for Discharge: Patient has failed to schedule further appointments.    Equipment Issued: none    Discharge Plan: Patient to continue home program.    Referring Provider:  Carlos Marquis

## 2024-09-20 ENCOUNTER — APPOINTMENT (OUTPATIENT)
Dept: ULTRASOUND IMAGING | Facility: CLINIC | Age: 29
End: 2024-09-20
Attending: SOCIAL WORKER

## 2024-09-20 ENCOUNTER — HOSPITAL ENCOUNTER (EMERGENCY)
Facility: CLINIC | Age: 29
Discharge: HOME | End: 2024-09-20
Attending: SOCIAL WORKER | Admitting: SOCIAL WORKER

## 2024-09-20 VITALS
DIASTOLIC BLOOD PRESSURE: 71 MMHG | SYSTOLIC BLOOD PRESSURE: 121 MMHG | HEART RATE: 65 BPM | WEIGHT: 160 LBS | HEIGHT: 68 IN | OXYGEN SATURATION: 98 % | RESPIRATION RATE: 16 BRPM | TEMPERATURE: 98 F | BODY MASS INDEX: 24.25 KG/M2

## 2024-09-20 DIAGNOSIS — R10.13 ABDOMINAL PAIN, EPIGASTRIC: ICD-10-CM

## 2024-09-20 DIAGNOSIS — Z71.1 CONCERN ABOUT STI IN MALE WITHOUT DIAGNOSIS: ICD-10-CM

## 2024-09-20 LAB
ALBUMIN SERPL BCG-MCNC: 4.5 G/DL (ref 3.5–5.2)
ALBUMIN UR-MCNC: NEGATIVE MG/DL
ALP SERPL-CCNC: 102 U/L (ref 40–150)
ALT SERPL W P-5'-P-CCNC: 34 U/L (ref 0–70)
ANION GAP SERPL CALCULATED.3IONS-SCNC: 10 MMOL/L (ref 7–15)
APPEARANCE UR: CLEAR
AST SERPL W P-5'-P-CCNC: 32 U/L (ref 0–45)
BASOPHILS # BLD AUTO: 0 10E3/UL (ref 0–0.2)
BASOPHILS NFR BLD AUTO: 1 %
BILIRUB SERPL-MCNC: 0.3 MG/DL
BILIRUB UR QL STRIP: NEGATIVE
BUN SERPL-MCNC: 9.4 MG/DL (ref 6–20)
CALCIUM SERPL-MCNC: 9.6 MG/DL (ref 8.8–10.4)
CHLORIDE SERPL-SCNC: 102 MMOL/L (ref 98–107)
COLOR UR AUTO: NORMAL
CREAT SERPL-MCNC: 0.84 MG/DL (ref 0.67–1.17)
EGFRCR SERPLBLD CKD-EPI 2021: >90 ML/MIN/1.73M2
EOSINOPHIL # BLD AUTO: 0.1 10E3/UL (ref 0–0.7)
EOSINOPHIL NFR BLD AUTO: 1 %
ERYTHROCYTE [DISTWIDTH] IN BLOOD BY AUTOMATED COUNT: 11.9 % (ref 10–15)
GLUCOSE SERPL-MCNC: 104 MG/DL (ref 70–99)
GLUCOSE UR STRIP-MCNC: NEGATIVE MG/DL
HCO3 SERPL-SCNC: 27 MMOL/L (ref 22–29)
HCT VFR BLD AUTO: 46 % (ref 40–53)
HGB BLD-MCNC: 15.3 G/DL (ref 13.3–17.7)
HGB UR QL STRIP: NEGATIVE
IMM GRANULOCYTES # BLD: 0 10E3/UL
IMM GRANULOCYTES NFR BLD: 1 %
KETONES UR STRIP-MCNC: NEGATIVE MG/DL
LEUKOCYTE ESTERASE UR QL STRIP: NEGATIVE
LIPASE SERPL-CCNC: 19 U/L (ref 13–60)
LYMPHOCYTES # BLD AUTO: 2.5 10E3/UL (ref 0.8–5.3)
LYMPHOCYTES NFR BLD AUTO: 45 %
MCH RBC QN AUTO: 30.1 PG (ref 26.5–33)
MCHC RBC AUTO-ENTMCNC: 33.3 G/DL (ref 31.5–36.5)
MCV RBC AUTO: 91 FL (ref 78–100)
MONOCYTES # BLD AUTO: 0.6 10E3/UL (ref 0–1.3)
MONOCYTES NFR BLD AUTO: 11 %
NEUTROPHILS # BLD AUTO: 2.3 10E3/UL (ref 1.6–8.3)
NEUTROPHILS NFR BLD AUTO: 41 %
NITRATE UR QL: NEGATIVE
NRBC # BLD AUTO: 0 10E3/UL
NRBC BLD AUTO-RTO: 0 /100
PH UR STRIP: 6.5 [PH] (ref 5–7)
PLATELET # BLD AUTO: 215 10E3/UL (ref 150–450)
POTASSIUM SERPL-SCNC: 4.2 MMOL/L (ref 3.4–5.3)
PROT SERPL-MCNC: 7.5 G/DL (ref 6.4–8.3)
RBC # BLD AUTO: 5.08 10E6/UL (ref 4.4–5.9)
RBC URINE: 0 /HPF
SODIUM SERPL-SCNC: 139 MMOL/L (ref 135–145)
SP GR UR STRIP: 1.01 (ref 1–1.03)
UROBILINOGEN UR STRIP-MCNC: NORMAL MG/DL
WBC # BLD AUTO: 5.5 10E3/UL (ref 4–11)
WBC URINE: 0 /HPF

## 2024-09-20 PROCEDURE — 81001 URINALYSIS AUTO W/SCOPE: CPT | Performed by: SOCIAL WORKER

## 2024-09-20 PROCEDURE — 99284 EMERGENCY DEPT VISIT MOD MDM: CPT | Mod: 25

## 2024-09-20 PROCEDURE — 76705 ECHO EXAM OF ABDOMEN: CPT

## 2024-09-20 PROCEDURE — 80053 COMPREHEN METABOLIC PANEL: CPT | Performed by: SOCIAL WORKER

## 2024-09-20 PROCEDURE — 36415 COLL VENOUS BLD VENIPUNCTURE: CPT | Performed by: SOCIAL WORKER

## 2024-09-20 PROCEDURE — 83690 ASSAY OF LIPASE: CPT | Performed by: SOCIAL WORKER

## 2024-09-20 PROCEDURE — 250N000013 HC RX MED GY IP 250 OP 250 PS 637: Performed by: SOCIAL WORKER

## 2024-09-20 PROCEDURE — 87491 CHLMYD TRACH DNA AMP PROBE: CPT | Performed by: SOCIAL WORKER

## 2024-09-20 PROCEDURE — 85004 AUTOMATED DIFF WBC COUNT: CPT | Performed by: SOCIAL WORKER

## 2024-09-20 RX ORDER — PANTOPRAZOLE SODIUM 40 MG/1
40 TABLET, DELAYED RELEASE ORAL DAILY
Qty: 14 TABLET | Refills: 0 | Status: SHIPPED | OUTPATIENT
Start: 2024-09-20 | End: 2024-09-28

## 2024-09-20 RX ORDER — SUCRALFATE 1 G/1
1 TABLET ORAL 4 TIMES DAILY
Qty: 56 TABLET | Refills: 0 | Status: SHIPPED | OUTPATIENT
Start: 2024-09-20 | End: 2024-09-28

## 2024-09-20 RX ORDER — MAGNESIUM HYDROXIDE/ALUMINUM HYDROXICE/SIMETHICONE 120; 1200; 1200 MG/30ML; MG/30ML; MG/30ML
15 SUSPENSION ORAL ONCE
Status: COMPLETED | OUTPATIENT
Start: 2024-09-20 | End: 2024-09-20

## 2024-09-20 RX ADMIN — ALUMINUM HYDROXIDE, MAGNESIUM HYDROXIDE, AND SIMETHICONE 15 ML: 200; 200; 20 SUSPENSION ORAL at 15:12

## 2024-09-20 ASSESSMENT — COLUMBIA-SUICIDE SEVERITY RATING SCALE - C-SSRS
6. HAVE YOU EVER DONE ANYTHING, STARTED TO DO ANYTHING, OR PREPARED TO DO ANYTHING TO END YOUR LIFE?: NO
2. HAVE YOU ACTUALLY HAD ANY THOUGHTS OF KILLING YOURSELF IN THE PAST MONTH?: NO
1. IN THE PAST MONTH, HAVE YOU WISHED YOU WERE DEAD OR WISHED YOU COULD GO TO SLEEP AND NOT WAKE UP?: NO

## 2024-09-20 ASSESSMENT — ACTIVITIES OF DAILY LIVING (ADL)
ADLS_ACUITY_SCORE: 35
ADLS_ACUITY_SCORE: 33
ADLS_ACUITY_SCORE: 33
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
## 2024-09-20 NOTE — DISCHARGE INSTRUCTIONS
You were seen in the emergency department for abdominal pain and concern for sexually transmitted infections.  Your exam and workup was overall reassuring here.      Please call your primary care doctor tomorrow to schedule a follow-up appointment on Monday or Tuesday.     I am giving you prescription for medications to take.  Please come back to the emergency department if you start to have severe belly pain that worsens, you are vomiting blood overlooks like coffee grounds, you have blood or dark black stools, you develop a fever, you have sudden chest pain, difficulty breathing, you are unable to urinate, you have fever, other concerning symptoms.

## 2024-09-21 LAB
C TRACH DNA SPEC QL PROBE+SIG AMP: NEGATIVE
N GONORRHOEA DNA SPEC QL NAA+PROBE: NEGATIVE

## 2024-09-28 ENCOUNTER — HOSPITAL ENCOUNTER (EMERGENCY)
Facility: CLINIC | Age: 29
Discharge: HOME | End: 2024-09-28
Attending: EMERGENCY MEDICINE | Admitting: EMERGENCY MEDICINE

## 2024-09-28 ENCOUNTER — APPOINTMENT (OUTPATIENT)
Dept: ULTRASOUND IMAGING | Facility: CLINIC | Age: 29
End: 2024-09-28
Attending: EMERGENCY MEDICINE

## 2024-09-28 VITALS
DIASTOLIC BLOOD PRESSURE: 71 MMHG | BODY MASS INDEX: 24.25 KG/M2 | WEIGHT: 160 LBS | HEIGHT: 68 IN | HEART RATE: 67 BPM | RESPIRATION RATE: 16 BRPM | OXYGEN SATURATION: 99 % | TEMPERATURE: 98.2 F | SYSTOLIC BLOOD PRESSURE: 107 MMHG

## 2024-09-28 DIAGNOSIS — R10.2 PELVIC PAIN IN MALE: ICD-10-CM

## 2024-09-28 DIAGNOSIS — N50.812 PAIN IN BOTH TESTICLES: ICD-10-CM

## 2024-09-28 DIAGNOSIS — R10.13 DYSPEPSIA: ICD-10-CM

## 2024-09-28 DIAGNOSIS — N50.811 PAIN IN BOTH TESTICLES: ICD-10-CM

## 2024-09-28 DIAGNOSIS — I77.6 AORTITIS (H): ICD-10-CM

## 2024-09-28 LAB
ALBUMIN SERPL BCG-MCNC: 4.7 G/DL (ref 3.5–5.2)
ALP SERPL-CCNC: 95 U/L (ref 40–150)
ALT SERPL W P-5'-P-CCNC: 41 U/L (ref 0–70)
ANION GAP SERPL CALCULATED.3IONS-SCNC: 13 MMOL/L (ref 7–15)
AST SERPL W P-5'-P-CCNC: 51 U/L (ref 0–45)
BASOPHILS # BLD AUTO: 0 10E3/UL (ref 0–0.2)
BASOPHILS NFR BLD AUTO: 1 %
BILIRUB SERPL-MCNC: 0.7 MG/DL
BUN SERPL-MCNC: 19.6 MG/DL (ref 6–20)
CALCIUM SERPL-MCNC: 10 MG/DL (ref 8.8–10.4)
CHLORIDE SERPL-SCNC: 104 MMOL/L (ref 98–107)
CREAT SERPL-MCNC: 1 MG/DL (ref 0.67–1.17)
EGFRCR SERPLBLD CKD-EPI 2021: >90 ML/MIN/1.73M2
EOSINOPHIL # BLD AUTO: 0.1 10E3/UL (ref 0–0.7)
EOSINOPHIL NFR BLD AUTO: 1 %
ERYTHROCYTE [DISTWIDTH] IN BLOOD BY AUTOMATED COUNT: 12 % (ref 10–15)
GLUCOSE SERPL-MCNC: 94 MG/DL (ref 70–99)
HCO3 SERPL-SCNC: 25 MMOL/L (ref 22–29)
HCT VFR BLD AUTO: 44.4 % (ref 40–53)
HGB BLD-MCNC: 15 G/DL (ref 13.3–17.7)
IMM GRANULOCYTES # BLD: 0 10E3/UL
IMM GRANULOCYTES NFR BLD: 0 %
LIPASE SERPL-CCNC: 19 U/L (ref 13–60)
LYMPHOCYTES # BLD AUTO: 2.9 10E3/UL (ref 0.8–5.3)
LYMPHOCYTES NFR BLD AUTO: 38 %
MCH RBC QN AUTO: 30.3 PG (ref 26.5–33)
MCHC RBC AUTO-ENTMCNC: 33.8 G/DL (ref 31.5–36.5)
MCV RBC AUTO: 90 FL (ref 78–100)
MONOCYTES # BLD AUTO: 0.8 10E3/UL (ref 0–1.3)
MONOCYTES NFR BLD AUTO: 11 %
NEUTROPHILS # BLD AUTO: 3.7 10E3/UL (ref 1.6–8.3)
NEUTROPHILS NFR BLD AUTO: 49 %
NRBC # BLD AUTO: 0 10E3/UL
NRBC BLD AUTO-RTO: 0 /100
PLATELET # BLD AUTO: 191 10E3/UL (ref 150–450)
POTASSIUM SERPL-SCNC: 4.2 MMOL/L (ref 3.4–5.3)
PROT SERPL-MCNC: 7.9 G/DL (ref 6.4–8.3)
RBC # BLD AUTO: 4.95 10E6/UL (ref 4.4–5.9)
SODIUM SERPL-SCNC: 142 MMOL/L (ref 135–145)
WBC # BLD AUTO: 7.5 10E3/UL (ref 4–11)

## 2024-09-28 PROCEDURE — 85025 COMPLETE CBC W/AUTO DIFF WBC: CPT | Performed by: EMERGENCY MEDICINE

## 2024-09-28 PROCEDURE — 93976 VASCULAR STUDY: CPT

## 2024-09-28 PROCEDURE — 250N000013 HC RX MED GY IP 250 OP 250 PS 637: Performed by: EMERGENCY MEDICINE

## 2024-09-28 PROCEDURE — 36415 COLL VENOUS BLD VENIPUNCTURE: CPT | Performed by: EMERGENCY MEDICINE

## 2024-09-28 PROCEDURE — 83690 ASSAY OF LIPASE: CPT | Performed by: EMERGENCY MEDICINE

## 2024-09-28 PROCEDURE — 99284 EMERGENCY DEPT VISIT MOD MDM: CPT | Performed by: EMERGENCY MEDICINE

## 2024-09-28 PROCEDURE — 80053 COMPREHEN METABOLIC PANEL: CPT | Performed by: EMERGENCY MEDICINE

## 2024-09-28 PROCEDURE — 99284 EMERGENCY DEPT VISIT MOD MDM: CPT | Mod: 25 | Performed by: EMERGENCY MEDICINE

## 2024-09-28 RX ORDER — ACETAMINOPHEN 500 MG
1000 TABLET ORAL ONCE
Status: COMPLETED | OUTPATIENT
Start: 2024-09-28 | End: 2024-09-28

## 2024-09-28 RX ORDER — PANTOPRAZOLE SODIUM 40 MG/1
40 TABLET, DELAYED RELEASE ORAL DAILY
Qty: 30 TABLET | Refills: 0 | Status: SHIPPED | OUTPATIENT
Start: 2024-09-28 | End: 2024-10-28

## 2024-09-28 RX ORDER — SUCRALFATE 1 G/1
1 TABLET ORAL 4 TIMES DAILY
Qty: 120 TABLET | Refills: 0 | Status: SHIPPED | OUTPATIENT
Start: 2024-09-28 | End: 2024-10-28

## 2024-09-28 RX ORDER — PANTOPRAZOLE SODIUM 40 MG/1
40 TABLET, DELAYED RELEASE ORAL ONCE
Status: COMPLETED | OUTPATIENT
Start: 2024-09-28 | End: 2024-09-28

## 2024-09-28 RX ORDER — SUCRALFATE ORAL 1 G/10ML
1 SUSPENSION ORAL ONCE
Status: COMPLETED | OUTPATIENT
Start: 2024-09-28 | End: 2024-09-28

## 2024-09-28 RX ADMIN — PANTOPRAZOLE SODIUM 40 MG: 40 TABLET, DELAYED RELEASE ORAL at 21:16

## 2024-09-28 RX ADMIN — ACETAMINOPHEN 1000 MG: 500 TABLET ORAL at 21:16

## 2024-09-28 RX ADMIN — SUCRALFATE 1 G: 1 SUSPENSION ORAL at 21:17

## 2024-09-28 ASSESSMENT — ACTIVITIES OF DAILY LIVING (ADL)
ADLS_ACUITY_SCORE: 35

## 2024-09-28 ASSESSMENT — COLUMBIA-SUICIDE SEVERITY RATING SCALE - C-SSRS
2. HAVE YOU ACTUALLY HAD ANY THOUGHTS OF KILLING YOURSELF IN THE PAST MONTH?: NO
1. IN THE PAST MONTH, HAVE YOU WISHED YOU WERE DEAD OR WISHED YOU COULD GO TO SLEEP AND NOT WAKE UP?: NO
6. HAVE YOU EVER DONE ANYTHING, STARTED TO DO ANYTHING, OR PREPARED TO DO ANYTHING TO END YOUR LIFE?: NO

## 2024-09-29 NOTE — ED TRIAGE NOTES
"Report headache and abdominal bloating about a month ago after returned from [place]. \"Went to the another ER was given meds but not helping.\"     Triage Assessment (Adult)       Row Name 09/28/24 2017          Triage Assessment    Airway WDL WDL        Respiratory WDL    Respiratory WDL WDL        Skin Circulation/Temperature WDL    Skin Circulation/Temperature WDL WDL        Cardiac WDL    Cardiac WDL WDL        Cognitive/Neuro/Behavioral WDL    Cognitive/Neuro/Behavioral WDL WDL                     "

## 2024-09-29 NOTE — DISCHARGE INSTRUCTIONS
Your testicular ultrasound was normal.    Your bloodwork was normal.    You received medications in the emergency department.  1 was Protonix and 1 was Carafate.  You are also prescribed these medications.  Take them as prescribed daily.  You must follow-up with your primary care doctor for these chronic concerns that you have.  They can reevaluate you after you have been taking this medicine regularly.  They can also refer you to a stomach doctor specialist who can take a look at the inside of your stomach to see if you need any different medications.  Return to the emergency department if you are having fevers, inability to eat or drink, or other worsening concerns    Do not take ibuprofen as this may make your stomach pain worse.  Take Tylenol as directed for your headaches.
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
## 2025-03-05 NOTE — ED TRIAGE NOTES
Pt presents with HA, nausea, and abd pain ongoing for three months. Pt reports pain has come and gone over this time period. Pt reports being seen in [place] two [place] and in [place].      Triage Assessment (Adult)       Row Name 09/20/24 1006          Triage Assessment    Airway WDL WDL        Cognitive/Neuro/Behavioral WDL    Cognitive/Neuro/Behavioral WDL WDL  HA                     
Uday Piedra, PT [facility]   3/20/2025  9:00 AM Uday Piedra, PT [facility]   3/25/2025  1:30 PM Ignacio Piedrael, PT [facility]   3/27/2025  9:00 AM Uday Piedra, PT [facility]   4/3/2025  9:00 AM Uday Piedra, PT [facility]   4/7/2025  8:15 AM Tonny Loyd MD [facility]   4/8/2025  1:30 PM Uday Piedra, PT [facility]   4/10/2025  1:30 PM TadeoUday chaudhry, PT [facility]   4/14/2025  1:30 PM Tadeo Uday, PT [facility]   4/17/2025  1:30 PM Uday Piedra, PT [facility]   6/12/2025  2:45 PM Gasper Morin, APRN - CNP [facility]   7/28/2025  8:50 AM Kenny Diana MD [facility]

## 2025-03-26 ENCOUNTER — HOSPITAL ENCOUNTER (EMERGENCY)
Facility: CLINIC | Age: 30
Discharge: HOME | End: 2025-03-26
Attending: FAMILY MEDICINE | Admitting: FAMILY MEDICINE

## 2025-03-26 VITALS
DIASTOLIC BLOOD PRESSURE: 51 MMHG | WEIGHT: 166.4 LBS | RESPIRATION RATE: 15 BRPM | HEIGHT: 69 IN | SYSTOLIC BLOOD PRESSURE: 128 MMHG | TEMPERATURE: 98 F | OXYGEN SATURATION: 98 % | BODY MASS INDEX: 24.65 KG/M2 | HEART RATE: 75 BPM

## 2025-03-26 DIAGNOSIS — B34.9 VIRAL SYNDROME: ICD-10-CM

## 2025-03-26 DIAGNOSIS — R51.9 NONINTRACTABLE HEADACHE, UNSPECIFIED CHRONICITY PATTERN, UNSPECIFIED HEADACHE TYPE: ICD-10-CM

## 2025-03-26 DIAGNOSIS — E86.0 MILD DEHYDRATION: ICD-10-CM

## 2025-03-26 LAB
ALBUMIN SERPL BCG-MCNC: 4.7 G/DL (ref 3.5–5.2)
ALP SERPL-CCNC: 98 U/L (ref 40–150)
ALT SERPL W P-5'-P-CCNC: 22 U/L (ref 0–70)
ANION GAP SERPL CALCULATED.3IONS-SCNC: 12 MMOL/L (ref 7–15)
AST SERPL W P-5'-P-CCNC: 23 U/L (ref 0–45)
BASOPHILS # BLD AUTO: 0 10E3/UL (ref 0–0.2)
BASOPHILS NFR BLD AUTO: 0 %
BILIRUB SERPL-MCNC: 0.5 MG/DL
BUN SERPL-MCNC: 13.5 MG/DL (ref 6–20)
CALCIUM SERPL-MCNC: 9.5 MG/DL (ref 8.8–10.4)
CHLORIDE SERPL-SCNC: 101 MMOL/L (ref 98–107)
CREAT SERPL-MCNC: 0.85 MG/DL (ref 0.67–1.17)
EGFRCR SERPLBLD CKD-EPI 2021: >90 ML/MIN/1.73M2
EOSINOPHIL # BLD AUTO: 0.1 10E3/UL (ref 0–0.7)
EOSINOPHIL NFR BLD AUTO: 2 %
ERYTHROCYTE [DISTWIDTH] IN BLOOD BY AUTOMATED COUNT: 11.9 % (ref 10–15)
FLUAV RNA SPEC QL NAA+PROBE: NEGATIVE
FLUBV RNA RESP QL NAA+PROBE: NEGATIVE
GLUCOSE SERPL-MCNC: 110 MG/DL (ref 70–99)
HCO3 SERPL-SCNC: 24 MMOL/L (ref 22–29)
HCT VFR BLD AUTO: 43.8 % (ref 40–53)
HGB BLD-MCNC: 15.6 G/DL (ref 13.3–17.7)
IMM GRANULOCYTES # BLD: 0 10E3/UL
IMM GRANULOCYTES NFR BLD: 0 %
LYMPHOCYTES # BLD AUTO: 2.3 10E3/UL (ref 0.8–5.3)
LYMPHOCYTES NFR BLD AUTO: 31 %
MCH RBC QN AUTO: 30.5 PG (ref 26.5–33)
MCHC RBC AUTO-ENTMCNC: 35.6 G/DL (ref 31.5–36.5)
MCV RBC AUTO: 86 FL (ref 78–100)
MONOCYTES # BLD AUTO: 1 10E3/UL (ref 0–1.3)
MONOCYTES NFR BLD AUTO: 13 %
NEUTROPHILS # BLD AUTO: 4.1 10E3/UL (ref 1.6–8.3)
NEUTROPHILS NFR BLD AUTO: 54 %
NRBC # BLD AUTO: 0 10E3/UL
NRBC BLD AUTO-RTO: 0 /100
PLATELET # BLD AUTO: 199 10E3/UL (ref 150–450)
POTASSIUM SERPL-SCNC: 3.6 MMOL/L (ref 3.4–5.3)
PROT SERPL-MCNC: 8 G/DL (ref 6.4–8.3)
RBC # BLD AUTO: 5.12 10E6/UL (ref 4.4–5.9)
RSV RNA SPEC NAA+PROBE: NEGATIVE
SARS-COV-2 RNA RESP QL NAA+PROBE: NEGATIVE
SODIUM SERPL-SCNC: 137 MMOL/L (ref 135–145)
WBC # BLD AUTO: 7.5 10E3/UL (ref 4–11)

## 2025-03-26 PROCEDURE — 258N000003 HC RX IP 258 OP 636: Performed by: FAMILY MEDICINE

## 2025-03-26 PROCEDURE — 99284 EMERGENCY DEPT VISIT MOD MDM: CPT | Performed by: FAMILY MEDICINE

## 2025-03-26 PROCEDURE — 87637 SARSCOV2&INF A&B&RSV AMP PRB: CPT | Performed by: FAMILY MEDICINE

## 2025-03-26 PROCEDURE — 85004 AUTOMATED DIFF WBC COUNT: CPT | Performed by: FAMILY MEDICINE

## 2025-03-26 PROCEDURE — 99284 EMERGENCY DEPT VISIT MOD MDM: CPT | Mod: 25 | Performed by: FAMILY MEDICINE

## 2025-03-26 PROCEDURE — 96374 THER/PROPH/DIAG INJ IV PUSH: CPT | Performed by: FAMILY MEDICINE

## 2025-03-26 PROCEDURE — 96361 HYDRATE IV INFUSION ADD-ON: CPT | Performed by: FAMILY MEDICINE

## 2025-03-26 PROCEDURE — 80053 COMPREHEN METABOLIC PANEL: CPT | Performed by: FAMILY MEDICINE

## 2025-03-26 PROCEDURE — 85041 AUTOMATED RBC COUNT: CPT | Performed by: FAMILY MEDICINE

## 2025-03-26 PROCEDURE — 36415 COLL VENOUS BLD VENIPUNCTURE: CPT | Performed by: FAMILY MEDICINE

## 2025-03-26 PROCEDURE — 250N000011 HC RX IP 250 OP 636: Performed by: FAMILY MEDICINE

## 2025-03-26 RX ORDER — KETOROLAC TROMETHAMINE 30 MG/ML
30 INJECTION, SOLUTION INTRAMUSCULAR; INTRAVENOUS ONCE
Status: COMPLETED | OUTPATIENT
Start: 2025-03-26 | End: 2025-03-26

## 2025-03-26 RX ORDER — IBUPROFEN 800 MG/1
800 TABLET, FILM COATED ORAL EVERY 8 HOURS PRN
Qty: 15 TABLET | Refills: 0 | Status: SHIPPED | OUTPATIENT
Start: 2025-03-26 | End: 2025-03-31

## 2025-03-26 RX ADMIN — KETOROLAC TROMETHAMINE 30 MG: 30 INJECTION, SOLUTION INTRAMUSCULAR at 09:43

## 2025-03-26 RX ADMIN — SODIUM CHLORIDE 1000 ML: 0.9 INJECTION, SOLUTION INTRAVENOUS at 09:43

## 2025-03-26 ASSESSMENT — COLUMBIA-SUICIDE SEVERITY RATING SCALE - C-SSRS
1. IN THE PAST MONTH, HAVE YOU WISHED YOU WERE DEAD OR WISHED YOU COULD GO TO SLEEP AND NOT WAKE UP?: NO
6. HAVE YOU EVER DONE ANYTHING, STARTED TO DO ANYTHING, OR PREPARED TO DO ANYTHING TO END YOUR LIFE?: NO
2. HAVE YOU ACTUALLY HAD ANY THOUGHTS OF KILLING YOURSELF IN THE PAST MONTH?: NO

## 2025-03-26 ASSESSMENT — ACTIVITIES OF DAILY LIVING (ADL)
ADLS_ACUITY_SCORE: 50
ADLS_ACUITY_SCORE: 50

## 2025-03-26 NOTE — DISCHARGE INSTRUCTIONS
Discharge to home with anti-inflammatories push fluids rest follow-up with primary clinic if not improved

## 2025-03-26 NOTE — ED TRIAGE NOTES
Pt having headache and body aches for two weeks. Pt recently developed a cough over the past two days. Pt describes the headache a generalized. Pt rates pain as 8/10. Pt has a negative BEFAST.      Triage Assessment (Adult)       Row Name 03/26/25 0858          Triage Assessment    Airway WDL WDL        Respiratory WDL    Respiratory WDL cough;X     Cough Frequency infrequent     Cough Type dry;no productive sputum        Skin Circulation/Temperature WDL    Skin Circulation/Temperature WDL WDL        Cardiac WDL    Cardiac WDL WDL        Peripheral/Neurovascular WDL    Peripheral Neurovascular WDL WDL        Cognitive/Neuro/Behavioral WDL    Cognitive/Neuro/Behavioral WDL orientation;WDL     Orientation oriented x 4
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
## 2025-04-05 ENCOUNTER — HEALTH MAINTENANCE LETTER (OUTPATIENT)
Age: 30
End: 2025-04-05

## 2025-05-25 ENCOUNTER — HOSPITAL ENCOUNTER (EMERGENCY)
Facility: CLINIC | Age: 30
Discharge: HOME | End: 2025-05-26
Attending: FAMILY MEDICINE

## 2025-05-25 DIAGNOSIS — S20.212A CHEST WALL CONTUSION, LEFT, INITIAL ENCOUNTER: ICD-10-CM

## 2025-05-25 PROCEDURE — 99285 EMERGENCY DEPT VISIT HI MDM: CPT | Mod: 25 | Performed by: FAMILY MEDICINE

## 2025-05-25 PROCEDURE — 93010 ELECTROCARDIOGRAM REPORT: CPT | Performed by: FAMILY MEDICINE

## 2025-05-25 PROCEDURE — 93005 ELECTROCARDIOGRAM TRACING: CPT | Performed by: FAMILY MEDICINE

## 2025-05-25 PROCEDURE — 96374 THER/PROPH/DIAG INJ IV PUSH: CPT | Performed by: FAMILY MEDICINE

## 2025-05-25 PROCEDURE — 99284 EMERGENCY DEPT VISIT MOD MDM: CPT | Performed by: FAMILY MEDICINE

## 2025-05-26 ENCOUNTER — APPOINTMENT (OUTPATIENT)
Dept: GENERAL RADIOLOGY | Facility: CLINIC | Age: 30
End: 2025-05-26
Attending: FAMILY MEDICINE

## 2025-05-26 VITALS
SYSTOLIC BLOOD PRESSURE: 109 MMHG | TEMPERATURE: 97.9 F | DIASTOLIC BLOOD PRESSURE: 68 MMHG | HEIGHT: 68 IN | BODY MASS INDEX: 25.91 KG/M2 | HEART RATE: 70 BPM | RESPIRATION RATE: 16 BRPM | WEIGHT: 171 LBS | OXYGEN SATURATION: 100 %

## 2025-05-26 LAB
ALBUMIN SERPL BCG-MCNC: 4.8 G/DL (ref 3.5–5.2)
ALP SERPL-CCNC: 99 U/L (ref 40–150)
ALT SERPL W P-5'-P-CCNC: 38 U/L (ref 0–70)
ANION GAP SERPL CALCULATED.3IONS-SCNC: 12 MMOL/L (ref 7–15)
AST SERPL W P-5'-P-CCNC: 33 U/L (ref 0–45)
ATRIAL RATE - MUSE: 59 BPM
ATRIAL RATE - MUSE: 62 BPM
BASOPHILS # BLD AUTO: 0 10E3/UL (ref 0–0.2)
BASOPHILS NFR BLD AUTO: 1 %
BILIRUB SERPL-MCNC: 0.4 MG/DL
BUN SERPL-MCNC: 14.8 MG/DL (ref 6–20)
CALCIUM SERPL-MCNC: 9.4 MG/DL (ref 8.8–10.4)
CHLORIDE SERPL-SCNC: 97 MMOL/L (ref 98–107)
CREAT SERPL-MCNC: 0.96 MG/DL (ref 0.67–1.17)
CRP SERPL-MCNC: <3 MG/L
DIASTOLIC BLOOD PRESSURE - MUSE: NORMAL MMHG
DIASTOLIC BLOOD PRESSURE - MUSE: NORMAL MMHG
EGFRCR SERPLBLD CKD-EPI 2021: >90 ML/MIN/1.73M2
EOSINOPHIL # BLD AUTO: 0.1 10E3/UL (ref 0–0.7)
EOSINOPHIL NFR BLD AUTO: 1 %
ERYTHROCYTE [DISTWIDTH] IN BLOOD BY AUTOMATED COUNT: 12.4 % (ref 10–15)
ERYTHROCYTE [SEDIMENTATION RATE] IN BLOOD BY WESTERGREN METHOD: 5 MM/HR (ref 0–15)
GLUCOSE SERPL-MCNC: 104 MG/DL (ref 70–99)
HCO3 SERPL-SCNC: 27 MMOL/L (ref 22–29)
HCT VFR BLD AUTO: 45.5 % (ref 40–53)
HGB BLD-MCNC: 15.7 G/DL (ref 13.3–17.7)
IMM GRANULOCYTES # BLD: 0 10E3/UL
IMM GRANULOCYTES NFR BLD: 0 %
INTERPRETATION ECG - MUSE: NORMAL
INTERPRETATION ECG - MUSE: NORMAL
LYMPHOCYTES # BLD AUTO: 2.4 10E3/UL (ref 0.8–5.3)
LYMPHOCYTES NFR BLD AUTO: 31 %
MCH RBC QN AUTO: 29.8 PG (ref 26.5–33)
MCHC RBC AUTO-ENTMCNC: 34.5 G/DL (ref 31.5–36.5)
MCV RBC AUTO: 86 FL (ref 78–100)
MONOCYTES # BLD AUTO: 0.8 10E3/UL (ref 0–1.3)
MONOCYTES NFR BLD AUTO: 10 %
NEUTROPHILS # BLD AUTO: 4.5 10E3/UL (ref 1.6–8.3)
NEUTROPHILS NFR BLD AUTO: 57 %
NRBC # BLD AUTO: 0 10E3/UL
NRBC BLD AUTO-RTO: 0 /100
P AXIS - MUSE: 51 DEGREES
P AXIS - MUSE: 53 DEGREES
PLATELET # BLD AUTO: 212 10E3/UL (ref 150–450)
POTASSIUM SERPL-SCNC: 3.9 MMOL/L (ref 3.4–5.3)
PR INTERVAL - MUSE: 136 MS
PR INTERVAL - MUSE: 140 MS
PROT SERPL-MCNC: 8 G/DL (ref 6.4–8.3)
QRS DURATION - MUSE: 102 MS
QRS DURATION - MUSE: 106 MS
QT - MUSE: 374 MS
QT - MUSE: 392 MS
QTC - MUSE: 379 MS
QTC - MUSE: 388 MS
R AXIS - MUSE: 46 DEGREES
R AXIS - MUSE: 51 DEGREES
RBC # BLD AUTO: 5.27 10E6/UL (ref 4.4–5.9)
SODIUM SERPL-SCNC: 136 MMOL/L (ref 135–145)
SYSTOLIC BLOOD PRESSURE - MUSE: NORMAL MMHG
SYSTOLIC BLOOD PRESSURE - MUSE: NORMAL MMHG
T AXIS - MUSE: 1 DEGREES
T AXIS - MUSE: 7 DEGREES
TROPONIN T SERPL HS-MCNC: 12 NG/L
VENTRICULAR RATE- MUSE: 59 BPM
VENTRICULAR RATE- MUSE: 62 BPM
WBC # BLD AUTO: 7.9 10E3/UL (ref 4–11)

## 2025-05-26 PROCEDURE — 85652 RBC SED RATE AUTOMATED: CPT | Performed by: FAMILY MEDICINE

## 2025-05-26 PROCEDURE — 86140 C-REACTIVE PROTEIN: CPT | Performed by: FAMILY MEDICINE

## 2025-05-26 PROCEDURE — 250N000011 HC RX IP 250 OP 636: Mod: JZ | Performed by: FAMILY MEDICINE

## 2025-05-26 PROCEDURE — 84484 ASSAY OF TROPONIN QUANT: CPT | Performed by: FAMILY MEDICINE

## 2025-05-26 PROCEDURE — 80053 COMPREHEN METABOLIC PANEL: CPT | Performed by: FAMILY MEDICINE

## 2025-05-26 PROCEDURE — 71046 X-RAY EXAM CHEST 2 VIEWS: CPT

## 2025-05-26 PROCEDURE — 36415 COLL VENOUS BLD VENIPUNCTURE: CPT | Performed by: FAMILY MEDICINE

## 2025-05-26 PROCEDURE — 85025 COMPLETE CBC W/AUTO DIFF WBC: CPT | Performed by: FAMILY MEDICINE

## 2025-05-26 RX ORDER — IBUPROFEN 600 MG/1
600 TABLET, FILM COATED ORAL EVERY 6 HOURS PRN
Qty: 30 TABLET | Refills: 0 | Status: SHIPPED | OUTPATIENT
Start: 2025-05-26

## 2025-05-26 RX ORDER — OXYCODONE AND ACETAMINOPHEN 5; 325 MG/1; MG/1
1 TABLET ORAL EVERY 6 HOURS PRN
Qty: 12 TABLET | Refills: 0 | Status: SHIPPED | OUTPATIENT
Start: 2025-05-26

## 2025-05-26 RX ORDER — KETOROLAC TROMETHAMINE 30 MG/ML
30 INJECTION, SOLUTION INTRAMUSCULAR; INTRAVENOUS ONCE
Status: COMPLETED | OUTPATIENT
Start: 2025-05-26 | End: 2025-05-26

## 2025-05-26 RX ADMIN — KETOROLAC TROMETHAMINE 30 MG: 30 INJECTION, SOLUTION INTRAMUSCULAR at 00:25

## 2025-05-26 ASSESSMENT — ACTIVITIES OF DAILY LIVING (ADL): ADLS_ACUITY_SCORE: 50

## 2025-05-26 NOTE — ED TRIAGE NOTES
Triage Assessment (Adult)       Row Name 05/25/25 6969          Triage Assessment    Airway WDL WDL        Respiratory WDL    Respiratory WDL WDL        Skin Circulation/Temperature WDL    Skin Circulation/Temperature WDL WDL        Cardiac WDL    Cardiac WDL X;chest pain        Chest Pain Assessment    Chest Pain Location midsternal        Peripheral/Neurovascular WDL    Peripheral Neurovascular WDL WDL        Cognitive/Neuro/Behavioral WDL    Cognitive/Neuro/Behavioral WDL WDL
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
## 2025-05-26 NOTE — DISCHARGE INSTRUCTIONS
Discharge to home with pain medications anti-inflammatories and following up with your primary MD for recheck if not improving

## 2025-05-26 NOTE — ED PROVIDER NOTES
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  Patient notes that he was playing soccer and was struck in the chest with someone's elbow he has very reproducible pain along the sternal border no crepitus was noted patient denies any deeper pain at this time.    As per epic review:   Patient presented to [facility] ED on 3/26/25 due to viral syndrome, headache and dehydration. Patient stated that he was fasting for ramadan and thought his symptoms were due to that. Patient was treated with ibuprofen for his symptoms.    Past Medical History  No past medical history on file.  No past surgical history on file.  ibuprofen (ADVIL/MOTRIN) 600 MG tablet  oxyCODONE-acetaminophen (PERCOCET) 5-325 MG tablet  acetaminophen (TYLENOL) 325 MG tablet  benzonatate (TESSALON) 100 MG capsule  cholecalciferol 125 MCG (5000 UT) CAPS  diphenhydrAMINE (BENADRYL) 25 MG tablet  doxylamine (UNISOM) 25 MG TABS tablet  ergocalciferol (ERGOCALCIFEROL) 1.25 MG (49265 UT) capsule  ibuprofen (ADVIL/MOTRIN) 600 MG tablet  melatonin 3 MG tablet  naproxen (NAPROSYN) 500 MG tablet  omeprazole (PRILOSEC) 20 MG DR capsule  traZODone (DESYREL) 100 MG tablet  zolpidem (AMBIEN) 5 MG tablet      No Known Allergies  Family History  No family history on file.  Social History   Social History     Tobacco Use    Smoking status: Never    Smokeless tobacco: Never   Substance Use Topics    Alcohol use: No    Drug use: No      Past medical history, past surgical history, medications, allergies, family history, and social history were reviewed with the patient. No additional pertinent items.   A complete review  "of systems was performed with pertinent positives and negatives noted in the HPI, and all other systems negative.    Physical Exam   BP: 111/73  Pulse: 70  Temp: 97.9  F (36.6  C)  Resp: 16  Height: 172.7 cm (5' 8\")  Weight: 77.6 kg (171 lb)  SpO2: 98 %  Physical Exam  Constitutional:       General: He is not in acute distress.     Appearance: Normal appearance. He is not toxic-appearing.   HENT:      Head: Atraumatic.   Eyes:      General: No scleral icterus.     Conjunctiva/sclera: Conjunctivae normal.   Cardiovascular:      Rate and Rhythm: Normal rate.      Heart sounds: Normal heart sounds.   Pulmonary:      Effort: Pulmonary effort is normal. No respiratory distress.      Breath sounds: Normal breath sounds.   Chest:      Chest wall: Tenderness present.   Abdominal:      Palpations: Abdomen is soft.      Tenderness: There is no abdominal tenderness. There is no guarding or rebound.   Musculoskeletal:         General: No deformity.      Cervical back: Neck supple.   Skin:     General: Skin is warm.   Neurological:      Mental Status: He is alert.           ED Course, Procedures, & Data      Procedures           EKG Interpretation:      Interpreted by Avery Stewart MD  Time reviewed: On arrival  Symptoms at time of EKG: Chest wall pain reproducible  Rhythm: Normal sinus  Rate: Normal  Axis: Normal  Ectopy: None  Conduction: Normal  ST Segments/ T Waves: No ST-T wave changes, No acute ischemic changes, and Non-specific ST-T wave changes  Q Waves: None  Comparison to prior: No old EKG available    Clinical Impression: EKG was evaluated by cardiology and agreed that abnormalities appear to be secondary to early repolarization       Results for orders placed or performed during the hospital encounter of 05/25/25   XR Chest 2 Views     Status: None    Narrative    EXAM: XR CHEST 2 VIEWS  LOCATION: [facility]  DATE: 5/26/2025    INDICATION: chest " pain  COMPARISON: None.      Impression    IMPRESSION: Negative chest.   Comprehensive metabolic panel     Status: Abnormal   Result Value Ref Range    Sodium 136 135 - 145 mmol/L    Potassium 3.9 3.4 - 5.3 mmol/L    Carbon Dioxide (CO2) 27 22 - 29 mmol/L    Anion Gap 12 7 - 15 mmol/L    Urea Nitrogen 14.8 6.0 - 20.0 mg/dL    Creatinine 0.96 0.67 - 1.17 mg/dL    GFR Estimate >90 >60 mL/min/1.73m2    Calcium 9.4 8.8 - 10.4 mg/dL    Chloride 97 (L) 98 - 107 mmol/L    Glucose 104 (H) 70 - 99 mg/dL    Alkaline Phosphatase 99 40 - 150 U/L    AST 33 0 - 45 U/L    ALT 38 0 - 70 U/L    Protein Total 8.0 6.4 - 8.3 g/dL    Albumin 4.8 3.5 - 5.2 g/dL    Bilirubin Total 0.4 <=1.2 mg/dL   Troponin T, High Sensitivity     Status: Normal   Result Value Ref Range    Troponin T, High Sensitivity 12 <=22 ng/L   Erythrocyte sedimentation rate auto     Status: Normal   Result Value Ref Range    Erythrocyte Sedimentation Rate 5 0 - 15 mm/hr   CRP inflammation     Status: Normal   Result Value Ref Range    CRP Inflammation <3.00 <5.00 mg/L   CBC with platelets and differential     Status: None   Result Value Ref Range    WBC Count 7.9 4.0 - 11.0 10e3/uL    RBC Count 5.27 4.40 - 5.90 10e6/uL    Hemoglobin 15.7 13.3 - 17.7 g/dL    Hematocrit 45.5 40.0 - 53.0 %    MCV 86 78 - 100 fL    MCH 29.8 26.5 - 33.0 pg    MCHC 34.5 31.5 - 36.5 g/dL    RDW 12.4 10.0 - 15.0 %    Platelet Count 212 150 - 450 10e3/uL    % Neutrophils 57 %    % Lymphocytes 31 %    % Monocytes 10 %    % Eosinophils 1 %    % Basophils 1 %    % Immature Granulocytes 0 %    NRBCs per 100 WBC 0 <1 /100    Absolute Neutrophils 4.5 1.6 - 8.3 10e3/uL    Absolute Lymphocytes 2.4 0.8 - 5.3 10e3/uL    Absolute Monocytes 0.8 0.0 - 1.3 10e3/uL    Absolute Eosinophils 0.1 0.0 - 0.7 10e3/uL    Absolute Basophils 0.0 0.0 - 0.2 10e3/uL    Absolute Immature Granulocytes 0.0 <=0.4 10e3/uL    Absolute NRBCs 0.0 10e3/uL   EKG 12 lead     Status: None   Result Value Ref Range    Systolic Blood  Pressure  mmHg    Diastolic Blood Pressure  mmHg    Ventricular Rate 62 BPM    Atrial Rate 62 BPM    WI Interval 136 ms    QRS Duration 102 ms     ms    QTc 379 ms    P Axis 53 degrees    R AXIS 46 degrees    T Axis 1 degrees    Interpretation ECG       Sinus rhythm  Minimal voltage criteria for LVH, may be normal variant  ST elevation, consider early repolarization, pericarditis, or injury  Nonspecific T wave abnormality  Abnormal ECG    Unconfirmed report - interpretation of this ECG is computer generated - see medical record for final interpretation  Confirmed by - EMERGENCY ROOM, PHYSICIAN ([id number]),  KRYSTAL BAKER ([id number]) on 5/26/2025 8:41:01 AM     EKG 12-lead, tracing only     Status: None   Result Value Ref Range    Systolic Blood Pressure  mmHg    Diastolic Blood Pressure  mmHg    Ventricular Rate 59 BPM    Atrial Rate 59 BPM    WI Interval 140 ms    QRS Duration 106 ms     ms    QTc 388 ms    P Axis 51 degrees    R AXIS 51 degrees    T Axis 7 degrees    Interpretation ECG       Sinus bradycardia  ST elevation consider anterior injury or acute infarct  ** ** ACUTE MI / STEMI ** **  Abnormal ECG    Unconfirmed report - interpretation of this ECG is computer generated - see medical record for final interpretation  Confirmed by - EMERGENCY ROOM, PHYSICIAN ([id number]),  KRYSTAL BAKER ([id number]) on 5/26/2025 8:41:15 AM     CBC with platelets differential     Status: None    Narrative    The following orders were created for panel order CBC with platelets differential.  Procedure                               Abnormality         Status                     ---------                               -----------         ------                     CBC with platelets and ...[[id number]]                      Final result                 Please view results for these tests on the individual orders.     Medications   ketorolac (TORADOL) injection 30 mg (30 mg Intravenous $Given 5/26/25 0025)     Labs  Ordered and Resulted from Time of ED Arrival to Time of ED Departure   COMPREHENSIVE METABOLIC PANEL - Abnormal       Result Value    Sodium 136      Potassium 3.9      Carbon Dioxide (CO2) 27      Anion Gap 12      Urea Nitrogen 14.8      Creatinine 0.96      GFR Estimate >90      Calcium 9.4      Chloride 97 (*)     Glucose 104 (*)     Alkaline Phosphatase 99      AST 33      ALT 38      Protein Total 8.0      Albumin 4.8      Bilirubin Total 0.4     TROPONIN T, HIGH SENSITIVITY - Normal    Troponin T, High Sensitivity 12     ERYTHROCYTE SEDIMENTATION RATE AUTO - Normal    Erythrocyte Sedimentation Rate 5     CRP INFLAMMATION - Normal    CRP Inflammation <3.00     CBC WITH PLATELETS AND DIFFERENTIAL    WBC Count 7.9      RBC Count 5.27      Hemoglobin 15.7      Hematocrit 45.5      MCV 86      MCH 29.8      MCHC 34.5      RDW 12.4      Platelet Count 212      % Neutrophils 57      % Lymphocytes 31      % Monocytes 10      % Eosinophils 1      % Basophils 1      % Immature Granulocytes 0      NRBCs per 100 WBC 0      Absolute Neutrophils 4.5      Absolute Lymphocytes 2.4      Absolute Monocytes 0.8      Absolute Eosinophils 0.1      Absolute Basophils 0.0      Absolute Immature Granulocytes 0.0      Absolute NRBCs 0.0       XR Chest 2 Views   Final Result   IMPRESSION: Negative chest.             Critical care was not performed.     Medical Decision Making  The patient's presentation was of moderate complexity (an acute complicated injury).    The patient's evaluation involved:  ordering and/or review of 3+ test(s) in this encounter (see separate area of note for details)  discussion of management or test interpretation with another health professional (patient discussed with cardiology on-call and agreed that the EKG changes appear to be secondary to early repolarization.)    The patient's management necessitated moderate risk (prescription drug management including medications given in the ED).    Assessment &  Plan        I have reviewed the nursing notes. I have reviewed the findings, diagnosis, plan and need for follow up with the patient.    Discharge Medication List as of 5/26/2025  1:12 AM        START taking these medications    Details   !! ibuprofen (ADVIL/MOTRIN) 600 MG tablet Take 1 tablet (600 mg) by mouth every 6 hours as needed for moderate pain., Disp-30 tablet, R-0, [facility]      oxyCODONE-acetaminophen (PERCOCET) 5-325 MG tablet Take 1 tablet by mouth every 6 hours as needed for severe pain., Disp-12 tablet, R-0, [facility]       !! - Potential duplicate medications found. Please discuss with provider.          Final diagnoses:   Chest wall contusion, left, initial encounter       Avery HUNG [facility] EMERGENCY DEPARTMENT  5/25/2025     Avery Stewart MD  05/28/25 [id number]

## 2025-07-27 ENCOUNTER — HOSPITAL ENCOUNTER (EMERGENCY)
Facility: CLINIC | Age: 30
Discharge: HOME | End: 2025-07-27
Attending: EMERGENCY MEDICINE | Admitting: EMERGENCY MEDICINE

## 2025-07-27 VITALS
WEIGHT: 169.6 LBS | TEMPERATURE: 94.9 F | HEART RATE: 73 BPM | OXYGEN SATURATION: 99 % | RESPIRATION RATE: 16 BRPM | SYSTOLIC BLOOD PRESSURE: 94 MMHG | BODY MASS INDEX: 25.7 KG/M2 | DIASTOLIC BLOOD PRESSURE: 62 MMHG | HEIGHT: 68 IN

## 2025-07-27 DIAGNOSIS — R51.9 HEADACHE, UNSPECIFIED HEADACHE TYPE: ICD-10-CM

## 2025-07-27 DIAGNOSIS — H00.015 HORDEOLUM EXTERNUM OF LEFT LOWER EYELID: Primary | ICD-10-CM

## 2025-07-27 PROCEDURE — 258N000003 HC RX IP 258 OP 636: Performed by: EMERGENCY MEDICINE

## 2025-07-27 PROCEDURE — 96361 HYDRATE IV INFUSION ADD-ON: CPT | Performed by: EMERGENCY MEDICINE

## 2025-07-27 PROCEDURE — 99284 EMERGENCY DEPT VISIT MOD MDM: CPT | Mod: 25 | Performed by: EMERGENCY MEDICINE

## 2025-07-27 PROCEDURE — 96375 TX/PRO/DX INJ NEW DRUG ADDON: CPT | Performed by: EMERGENCY MEDICINE

## 2025-07-27 PROCEDURE — 99284 EMERGENCY DEPT VISIT MOD MDM: CPT | Performed by: EMERGENCY MEDICINE

## 2025-07-27 PROCEDURE — 250N000011 HC RX IP 250 OP 636: Performed by: EMERGENCY MEDICINE

## 2025-07-27 PROCEDURE — 96374 THER/PROPH/DIAG INJ IV PUSH: CPT | Performed by: EMERGENCY MEDICINE

## 2025-07-27 RX ORDER — METOCLOPRAMIDE HYDROCHLORIDE 5 MG/ML
10 INJECTION INTRAMUSCULAR; INTRAVENOUS ONCE
Status: COMPLETED | OUTPATIENT
Start: 2025-07-27 | End: 2025-07-27

## 2025-07-27 RX ORDER — ERYTHROMYCIN 5 MG/G
0.5 OINTMENT OPHTHALMIC 3 TIMES DAILY
Qty: 3.5 G | Refills: 0 | Status: SHIPPED | OUTPATIENT
Start: 2025-07-27 | End: 2025-08-01

## 2025-07-27 RX ORDER — KETOROLAC TROMETHAMINE 15 MG/ML
15 INJECTION, SOLUTION INTRAMUSCULAR; INTRAVENOUS ONCE
Status: COMPLETED | OUTPATIENT
Start: 2025-07-27 | End: 2025-07-27

## 2025-07-27 RX ORDER — DIPHENHYDRAMINE HYDROCHLORIDE 50 MG/ML
25 INJECTION, SOLUTION INTRAMUSCULAR; INTRAVENOUS ONCE
Status: COMPLETED | OUTPATIENT
Start: 2025-07-27 | End: 2025-07-27

## 2025-07-27 RX ORDER — MINERAL OIL/HYDROPHIL PETROLAT
OINTMENT (GRAM) TOPICAL PRN
Qty: 198 G | Refills: 0 | Status: SHIPPED | OUTPATIENT
Start: 2025-07-27

## 2025-07-27 RX ADMIN — KETOROLAC TROMETHAMINE 15 MG: 15 INJECTION, SOLUTION INTRAMUSCULAR; INTRAVENOUS at 08:30

## 2025-07-27 RX ADMIN — DIPHENHYDRAMINE HYDROCHLORIDE 25 MG: 50 INJECTION, SOLUTION INTRAMUSCULAR; INTRAVENOUS at 08:30

## 2025-07-27 RX ADMIN — METOCLOPRAMIDE HYDROCHLORIDE 10 MG: 5 INJECTION INTRAMUSCULAR; INTRAVENOUS at 08:30

## 2025-07-27 RX ADMIN — SODIUM CHLORIDE 1000 ML: 0.9 INJECTION, SOLUTION INTRAVENOUS at 08:29

## 2025-07-27 ASSESSMENT — COLUMBIA-SUICIDE SEVERITY RATING SCALE - C-SSRS
2. HAVE YOU ACTUALLY HAD ANY THOUGHTS OF KILLING YOURSELF IN THE PAST MONTH?: NO
6. HAVE YOU EVER DONE ANYTHING, STARTED TO DO ANYTHING, OR PREPARED TO DO ANYTHING TO END YOUR LIFE?: NO
1. IN THE PAST MONTH, HAVE YOU WISHED YOU WERE DEAD OR WISHED YOU COULD GO TO SLEEP AND NOT WAKE UP?: NO

## 2025-07-27 ASSESSMENT — VISUAL ACUITY
OU: NORMAL
OS: 20/30
OU: 20/20
OD: 20/25

## 2025-07-27 ASSESSMENT — ACTIVITIES OF DAILY LIVING (ADL)
ADLS_ACUITY_SCORE: 50

## 2025-07-27 NOTE — DISCHARGE INSTRUCTIONS
Instructions from your doctor today:  Emergency Department (ED) testing is focused on the potential causes of your symptoms that are the most dangerous possibilities, and cannot cover every possibility. Based on the evaluation, it was deemed sufficiently safe to discharge and continue management through the clinics. Thus, follow-up is very important to assess for improvement/worsening, potential further testing, and potential treatment adjustments. If you were given opioid pain medications or other medications that can make you drowsy while in the ED, you should not drive for at least several hours and not until you feel completely back to normal.     Please make an appointment to follow up with:  - Your Primary Care Provider in 4-7 days  - If you do not have a primary care provider, you can be seen in follow-up and establish care by calling any of the clinics below:     - [facility] (phone: [phone])     - Primary Care / [facility] (phone: [phone])   - Have your clinic provider review the results from today's visit with you again, including any potential follow-up or additional testing that may be needed based on the results. Occasionally, incidental findings are found on later review by radiologists that may need follow-up.     Return to the Emergency Department immediately if you have worsening symptoms, or any other urgent health concerns.

## 2025-07-27 NOTE — ED PROVIDER NOTES
"  History     Chief Complaint   Patient presents with    Eye Pain     Pt c/o pain swelling under L eye x 2 days.    Headache     Pt c/o HA x 2 days     HPI  Stephon Wolfe is a 29 year old male with PMH notable for asthma, GERD who presents to the ED with headache.  Symptoms started 2 days ago.  Patient notes swelling and discomfort near his left eye.  No trauma.  He has associated frontal headache.  He has tried ibuprofen for his symptoms with mild improvement.  No fevers    Physical Exam   BP: 103/66  Pulse: 62  Temp: 97.8  F (36.6  C)  Resp: 12  Height: 172.7 cm (5' 8\")  Weight: 76.9 kg (169 lb 9.6 oz)  SpO2: 100 %    Physical Exam  General: no acute distress. Appears stated age.   HENT: MMM, no oropharyngeal lesions  Eyes: PERRL, normal sclerae, stye with associated mild lower lid edema present on the left  Neck: full ROM, no meningeal signs  Cardio: Regular rate. Regular rhythm. Extremities well perfused  Resp: Normal work of breathing, Normal respiratory rate.   Neuro: alert without signs of confusion. CN II-XII grossly intact. Grossly normal strength and sensation in all extremities.   Psych: normal affect, normal behavior      ED Course      Procedures              Labs Ordered and Resulted from Time of ED Arrival to Time of ED Departure - No data to display  No orders to display            Medical Decision Making  The patient's presentation was of moderate complexity (an undiagnosed new problem with uncertain prognosis).    The patient's evaluation involved:  history and exam without other MDM data elements    The patient's management necessitated moderate risk (prescription drug management including medications given in the ED).      Assessments & Plan   Patient presenting with headache and swelling with discomfort near the left eye. Vitals in the ED unremarkable. Nursing notes reviewed.  Neuroexam nonfocal, ocular exam notable for stye on the medial aspect of the left lower lid with associated mild " lower lid edema.    In the ED, the patient's headache symptoms were managed with ketorolac, metoclopramide, diphenhydramine, and NS bolus, with improvement in symptoms upon reassessment.     Patient had also noted some dry skin on his neck, requested prescription to help with it.    The complete clinical picture is most consistent with left eye lower lid stye, headache likely tension type. After counseling on the diagnosis, work-up, and treatment plan, the patient was discharged to home. The patient was advised to follow-up with primary care in about a week. The patient was advised to return to the ED if worsening symptoms, or any urgent health concerns.     Final diagnoses:   Hordeolum externum of left lower eyelid   Headache, unspecified headache type     New Prescriptions    ERYTHROMYCIN (ROMYCIN) 5 MG/GM OPHTHALMIC OINTMENT    Place 0.5 inches Into the left eye 3 times daily for 5 days.    MINERAL OIL-HYDROPHILIC PETROLATUM (AQUAPHOR) EXTERNAL OINTMENT    Apply topically as needed for dry skin.     --  Chip Palencia MD   Emergency Medicine   [facility] EMERGENCY DEPARTMENT  7/27/2025       Chip Palencia MD  07/27/25 [id number]